# Patient Record
Sex: MALE | Race: WHITE | NOT HISPANIC OR LATINO | Employment: OTHER | URBAN - METROPOLITAN AREA
[De-identification: names, ages, dates, MRNs, and addresses within clinical notes are randomized per-mention and may not be internally consistent; named-entity substitution may affect disease eponyms.]

---

## 2017-01-06 NOTE — PROGRESS NOTES
SUBJECTIVE:                                                    Markel Padilla is a 76 year old male who presents to clinic today for the following health issues:    Hyperlipidemia Follow-Up      Rate your low fat/cholesterol diet?: fair    Taking statin?  Yes, no muscle aches from statin    Other lipid medications/supplements?:  Fish oil/Omega 3, without side effects     Hypertension Follow-up      Outpatient blood pressures are being checked at home.  Results are vary - brought in readings today to review. Was 130/70 this morning    Low Salt Diet: not monitoring salt       Amount of exercise or physical activity: 2 times a day     Problems taking medications regularly: No    Medication side effects: none    Diet: low fat/cholesterol  Years of fingers going white and numb when in cold. No injury. Tx: none.     Problem list and histories reviewed & adjusted, as indicated.  Additional history: as documented    Patient Active Problem List   Diagnosis     GERD (gastroesophageal reflux disease)     Erectile dysfunction     Hyperlipidemia LDL goal <130     Overweight (BMI 25.0-29.9)     Advanced directives, counseling/discussion     Hearing loss     Sleep disturbance     Hyponatremia     Eczema     Gastroesophageal reflux disease, esophagitis presence not specified     Hearing loss, bilateral     Benign essential hypertension     HTN, goal below 150/90     Raynaud's disease without gangrene     Past Surgical History   Procedure Laterality Date     No history of surgery         Social History   Substance Use Topics     Smoking status: Former Smoker     Types: Cigarettes     Quit date: 11/02/1979     Smokeless tobacco: Former User     Quit date: 04/07/1989     Alcohol Use: Yes      Comment: rare     Family History   Problem Relation Age of Onset     HEART DISEASE Mother      Lipids Mother      C.A.D. Father      age 65     Respiratory Sister      COPD     Hypertension Son      Hypertension Son          Current  Outpatient Prescriptions   Medication Sig Dispense Refill     simvastatin (ZOCOR) 40 MG tablet Take 1 tablet (40 mg) by mouth At Bedtime 90 tablet 3     lisinopril (PRINIVIL/ZESTRIL) 20 MG tablet Take 1 tablet (20 mg) by mouth daily 90 tablet 1     omeprazole (PRILOSEC) 20 MG capsule Take 1 capsule (20 mg) by mouth daily 90 capsule 4     triamcinolone (KENALOG) 0.5 % cream Apply sparingly to affected area three times daily. 30 g 0     ferrous sulfate 325 (65 FE) MG tablet Take by mouth daily (with breakfast)       calcium carbonate (OS-FREDIS 500 MG [place]. [place]) 500 MG tablet Take 1 tablet (500 mg) by mouth 2 times daily 180 tablet 3     Glucosamine HCl 1500 MG TABS Take 2 tablets by mouth daily 30 tablet 0     GLUCOSAMINE CHONDR 1500 COMPLX OR 1 tablet daily       aspirin 81 MG tablet Take 1 tablet by mouth daily.       MULTI-VITAMIN OR TABS 1 TABLET DAILY       FISH OIL 1000 MG OR CAPS 1 tablet daily       [DISCONTINUED] simvastatin (ZOCOR) 40 MG tablet Take 1 tablet (40 mg) by mouth At Bedtime 90 tablet 3     [DISCONTINUED] lisinopril (PRINIVIL,ZESTRIL) 20 MG tablet Take 1 tablet (20 mg) by mouth daily 90 tablet 0     Allergies   Allergen Reactions     Nkda [No Known Drug Allergies]      BP Readings from Last 3 Encounters:   01/10/17 148/72   09/09/16 136/77   07/27/16 138/77    Wt Readings from Last 3 Encounters:   01/10/17 170 lb 3.2 oz (77.202 kg)   09/09/16 169 lb (76.658 kg)   07/27/16 166 lb (75.297 kg)                  Labs reviewed in EPIC  Problem list, Medication list, Allergies, and Medical/Social/Surgical histories reviewed in [facility] and updated as appropriate.    ROS:  Constitutional, HEENT, cardiovascular, pulmonary, gi and gu systems are negative, except as otherwise noted.    OBJECTIVE:                                                    /72 mmHg  Pulse 63  Wt 170 lb 3.2 oz (77.202 kg)  SpO2 99%  Body mass index is 26.65 kg/(m^2).  GENERAL: healthy, alert and no distress  NECK: no adenopathy, no  asymmetry, masses, or scars and thyroid normal to palpation  RESP: lungs clear to auscultation - no rales, rhonchi or wheezes  CV: regular rate and rhythm, normal S1 S2, no S3 or S4, no murmur, click or rub, no peripheral edema and peripheral pulses strong  ABDOMEN: soft, nontender, no hepatosplenomegaly, no masses and bowel sounds normal  MS: no gross musculoskeletal defects noted, no edema    Diagnostic Test Results:  none      ASSESSMENT/PLAN:                                                        ICD-10-CM    1. HTN, goal below 150/90 I10 lisinopril (PRINIVIL/ZESTRIL) 20 MG tablet     Basic metabolic panel   2. Hyperlipidemia LDL goal <130 E78.5 simvastatin (ZOCOR) 40 MG tablet     Lipid panel reflex to direct LDL   3. Need for vaccination Z23 Pneumococcal vaccine 13 valent PCV13 IM (Prevnar) [01322]     1st  Administration  [22723]   4. Raynaud's disease without gangrene I73.00    meds refilled  Labs pending  Recheck blood pressure in 6 months.  Patient reassurance on raynauds. tx discussed. Elects to try to dress warmer with layers.     Jonathan Oliver PA-C  [facility]

## 2017-01-10 ENCOUNTER — OFFICE VISIT (OUTPATIENT)
Dept: FAMILY MEDICINE | Facility: CLINIC | Age: 77
End: 2017-01-10
Payer: COMMERCIAL

## 2017-01-10 VITALS
OXYGEN SATURATION: 99 % | BODY MASS INDEX: 26.65 KG/M2 | HEART RATE: 63 BPM | DIASTOLIC BLOOD PRESSURE: 72 MMHG | WEIGHT: 170.2 LBS | SYSTOLIC BLOOD PRESSURE: 148 MMHG

## 2017-01-10 DIAGNOSIS — I10 HTN, GOAL BELOW 150/90: Primary | ICD-10-CM

## 2017-01-10 DIAGNOSIS — E78.5 HYPERLIPIDEMIA LDL GOAL <130: ICD-10-CM

## 2017-01-10 DIAGNOSIS — Z23 NEED FOR VACCINATION: ICD-10-CM

## 2017-01-10 DIAGNOSIS — I73.00 RAYNAUD'S DISEASE WITHOUT GANGRENE: ICD-10-CM

## 2017-01-10 LAB
ANION GAP SERPL CALCULATED.3IONS-SCNC: 8 MMOL/L (ref 3–14)
BUN SERPL-MCNC: 8 MG/DL (ref 7–30)
CALCIUM SERPL-MCNC: 9.2 MG/DL (ref 8.5–10.1)
CHLORIDE SERPL-SCNC: 101 MMOL/L (ref 94–109)
CHOLEST SERPL-MCNC: 166 MG/DL
CO2 SERPL-SCNC: 28 MMOL/L (ref 20–32)
CREAT SERPL-MCNC: 0.83 MG/DL (ref 0.66–1.25)
GFR SERPL CREATININE-BSD FRML MDRD: 89 ML/MIN/1.7M2
GLUCOSE SERPL-MCNC: 99 MG/DL (ref 70–99)
HDLC SERPL-MCNC: 67 MG/DL
LDLC SERPL CALC-MCNC: 84 MG/DL
NONHDLC SERPL-MCNC: 99 MG/DL
POTASSIUM SERPL-SCNC: 4.4 MMOL/L (ref 3.4–5.3)
SODIUM SERPL-SCNC: 137 MMOL/L (ref 133–144)
TRIGL SERPL-MCNC: 73 MG/DL

## 2017-01-10 PROCEDURE — 80048 BASIC METABOLIC PNL TOTAL CA: CPT | Performed by: PHYSICIAN ASSISTANT

## 2017-01-10 PROCEDURE — 36415 COLL VENOUS BLD VENIPUNCTURE: CPT | Performed by: PHYSICIAN ASSISTANT

## 2017-01-10 PROCEDURE — 90471 IMMUNIZATION ADMIN: CPT | Performed by: PHYSICIAN ASSISTANT

## 2017-01-10 PROCEDURE — 99213 OFFICE O/P EST LOW 20 MIN: CPT | Mod: 25 | Performed by: PHYSICIAN ASSISTANT

## 2017-01-10 PROCEDURE — 90670 PCV13 VACCINE IM: CPT | Performed by: PHYSICIAN ASSISTANT

## 2017-01-10 PROCEDURE — 80061 LIPID PANEL: CPT | Performed by: PHYSICIAN ASSISTANT

## 2017-01-10 RX ORDER — LISINOPRIL 20 MG/1
20 TABLET ORAL DAILY
Qty: 90 TABLET | Refills: 1 | Status: SHIPPED | OUTPATIENT
Start: 2017-01-10 | End: 2017-08-16

## 2017-01-10 RX ORDER — SIMVASTATIN 40 MG
40 TABLET ORAL AT BEDTIME
Qty: 90 TABLET | Refills: 3 | Status: SHIPPED | OUTPATIENT
Start: 2017-01-10 | End: 2017-08-16

## 2017-01-10 NOTE — PROGRESS NOTES
Quick Note:    Mr. Padilla,    All of your labs were normal for you.    Please contact the clinic if you have additional questions. Thank you.    Sincerely,    Jonathan Oliver PA-C       ______

## 2017-01-10 NOTE — NURSING NOTE
"Chief Complaint   Patient presents with     Lipids     Hypertension       Initial /76 mmHg  Pulse 63  Wt 170 lb 3.2 oz (77.202 kg)  SpO2 99% Estimated body mass index is 26.65 kg/(m^2) as calculated from the following:    Height as of 1/27/16: 5' 7\" (1.702 m).    Weight as of this encounter: 170 lb 3.2 oz (77.202 kg).  BP completed using cuff size: daisy Plascencia CMA      "

## 2017-01-10 NOTE — MR AVS SNAPSHOT
"              After Visit Summary   1/10/2017    Markel Padilla    MRN: [id number]           Patient Information     Date Of Birth          1940        Visit Information        Provider Department      1/10/2017 7:40 AM Jonathan Oliver PA-C [facility]        Today's Diagnoses     HTN, goal below 150/90    -  1     Hyperlipidemia LDL goal <130            Follow-ups after your visit        Who to contact     If you have questions or need follow up information about today's clinic visit or your schedule please contact [facility] directly at [phone].  Normal or non-critical lab and imaging results will be communicated to you by [employer]ist[employer], letter or phone within 4 business days after the clinic has received the results. If you do not hear from us within 7 days, please contact the clinic through [employer] or phone. If you have a critical or abnormal lab result, we will notify you by phone as soon as possible.  Submit refill requests through [employer] or call your pharmacy and they will forward the refill request to us. Please allow 3 business days for your refill to be completed.          Additional Information About Your Visit        MyChart Information     [employer] lets you send messages to your doctor, view your test results, renew your prescriptions, schedule appointments and more. To sign up, go to [email or web address] . Click on \"Log in\" on the left side of the screen, which will take you to the Welcome page. Then click on \"Sign up Now\" on the right side of the page.     You will be asked to enter the access code listed below, as well as some personal information. Please follow the directions to create your username and password.     Your access code is: [email or web address]  Expires: 4/10/2017  7:54 AM     Your access code will  in 90 days. If you need help or a new code, please call your [facility] or [phone].        Care EveryWhere ID     This is your Care " EveryWhere ID. This could be used by other organizations to access your [place] medical records  [id number]        Your Vitals Were     Pulse Pulse Oximetry                63 99%           Blood Pressure from Last 3 Encounters:   01/10/17 148/72   09/09/16 136/77   07/27/16 138/77    Weight from Last 3 Encounters:   01/10/17 170 lb 3.2 oz (77.202 kg)   09/09/16 169 lb (76.658 kg)   07/27/16 166 lb (75.297 kg)              Today, you had the following     No orders found for display         Where to get your medicines      These medications were sent to [facility] [address] [place], [place] - [address] [facility] [address]  [address] [facility] [place] [address]     Phone:  [phone]    - lisinopril 20 MG tablet  - simvastatin 40 MG tablet       Primary Care Provider Office Phone # Fax #    Jonathan Oliver PA-C [phone]       [facility] [address] [facility] [address]        Thank you!     Thank you for choosing [facility]  for your care. Our goal is always to provide you with excellent care. Hearing back from our patients is one way we can continue to improve our services. Please take a few minutes to complete the written survey that you may receive in the mail after your visit with us. Thank you!             Your Updated Medication List - Protect others around you: Learn how to safely use, store and throw away your medicines at www.disposemymeds.org.          This list is accurate as of: 1/10/17  7:54 AM.  Always use your most recent med list.                   Brand Name Dispense Instructions for use    aspirin 81 MG tablet      Take 1 tablet by mouth daily.       calcium carbonate 500 MG tablet    OS-FREDIS 500 mg [place]. Ca    180 tablet    Take 1 tablet (500 mg) by mouth 2 times daily       ferrous sulfate 325 (65 FE) MG tablet    IRON     Take by mouth daily (with breakfast)       fish oil-omega-3 fatty acids 1000 MG capsule      1  tablet daily       GLUCOSAMINE CHONDR 1500 COMPLX PO      1 tablet daily       Glucosamine HCl 1500 MG Tabs     30 tablet    Take 2 tablets by mouth daily       lisinopril 20 MG tablet    PRINIVIL/ZESTRIL    90 tablet    Take 1 tablet (20 mg) by mouth daily       Multi-vitamin Tabs tablet   Generic drug:  multivitamin, therapeutic with minerals      1 TABLET DAILY       omeprazole 20 MG CR capsule    priLOSEC    90 capsule    Take 1 capsule (20 mg) by mouth daily       simvastatin 40 MG tablet    ZOCOR    90 tablet    Take 1 tablet (40 mg) by mouth At Bedtime       triamcinolone 0.5 % cream    KENALOG    30 g    Apply sparingly to affected area three times daily.

## 2017-01-10 NOTE — NURSING NOTE
Screening Questionnaire for Adult Immunization    Are you sick today?   No   Do you have allergies to medications, food, a vaccine component or latex?   No   Have you ever had a serious reaction after receiving a vaccination?   No   Do you have a long-term health problem with heart disease, lung disease, asthma, kidney disease, metabolic disease (e.g. diabetes), anemia, or other blood disorder?   No   Do you have cancer, leukemia, HIV/AIDS, or any other immune system problem?   No   In the past 3 months, have you taken medications that affect  your immune system, such as prednisone, other steroids, or anticancer drugs; drugs for the treatment of rheumatoid arthritis, Crohn s disease, or psoriasis; or have you had radiation treatments?   No   Have you had a seizure, or a brain or other nervous system problem?   No   During the past year, have you received a transfusion of blood or blood     products, or been given immune (gamma) globulin or antiviral drug?   No   For women: Are you pregnant or is there a chance you could become        pregnant during the next month?   No   Have you received any vaccinations in the past 4 weeks?   No     Immunization questionnaire answers were all negative.      MNVFC doesn't apply on this patient    Per orders of ADO, injection of Prevnar 13 given by Dixie Dockery. Patient instructed to remain in clinic for 20 minutes afterwards, and to report any adverse reaction to me immediately.       Screening performed by Dixie Dockery on 1/10/2017 at 8:04 AM.

## 2017-01-10 NOTE — Clinical Note
[facility]  [address] [facility] [address] [phone]        January 10, 2017    Markel Padilla  [address]  [place] [address]            Dear Markel,    All of your labs were normal for you.    Please contact the clinic if you have additional questions.  Thank you.    Sincerely,    Jonathan Oliver PA-C/solomon    Results for orders placed or performed in visit on 01/10/17   Lipid panel reflex to direct LDL   Result Value Ref Range    Cholesterol 166 <200 mg/dL    Triglycerides 73 <150 mg/dL    HDL Cholesterol 67 >39 mg/dL    LDL Cholesterol Calculated 84 <100 mg/dL    Non HDL Cholesterol 99 <130 mg/dL   Basic metabolic panel   Result Value Ref Range    Sodium 137 133 - 144 mmol/L    Potassium 4.4 3.4 - 5.3 mmol/L    Chloride 101 94 - 109 mmol/L    Carbon Dioxide 28 20 - 32 mmol/L    Anion Gap 8 3 - 14 mmol/L    Glucose 99 70 - 99 mg/dL    Urea Nitrogen 8 7 - 30 mg/dL    Creatinine 0.83 0.66 - 1.25 mg/dL    GFR Estimate 89 >60 mL/min/1.7m2    GFR Estimate If Black >90   GFR Calc   >60 mL/min/1.7m2    Calcium 9.2 8.5 - 10.1 mg/dL

## 2017-03-02 ENCOUNTER — OFFICE VISIT (OUTPATIENT)
Dept: FAMILY MEDICINE | Facility: CLINIC | Age: 77
End: 2017-03-02
Payer: COMMERCIAL

## 2017-03-02 VITALS
OXYGEN SATURATION: 98 % | HEART RATE: 79 BPM | DIASTOLIC BLOOD PRESSURE: 78 MMHG | SYSTOLIC BLOOD PRESSURE: 148 MMHG | BODY MASS INDEX: 27.1 KG/M2 | TEMPERATURE: 97.9 F | WEIGHT: 173 LBS

## 2017-03-02 DIAGNOSIS — S16.1XXA STRAIN OF NECK MUSCLE, INITIAL ENCOUNTER: Primary | ICD-10-CM

## 2017-03-02 PROCEDURE — 99213 OFFICE O/P EST LOW 20 MIN: CPT | Performed by: PHYSICIAN ASSISTANT

## 2017-03-02 NOTE — MR AVS SNAPSHOT
"              After Visit Summary   3/2/2017    Markel Padilla    MRN: [id number]           Patient Information     Date Of Birth          1940        Visit Information        Provider Department      3/2/2017 11:00 AM Jonathan Oliver PA-C [facility]        Today's Diagnoses     Strain of neck muscle, initial encounter    -  1      Care Instructions    ice or cold packs 20 minutes every 2-3 hrs as needed to relieve pain and swelling, for the first 2 days. Then can apply heat 20 minutes every 2-3 hrs (avoid sleeping on heating pad) there after as needed.   If able to tolerate non-steroidal anti-inflammatory medication like:   Ibuprofen 400mg-600mg three times a day as needed   Or Tylenol three times a day as needed.   Active range of motion exercises encouraged  Activity modification trying to avoid activities that cause you pain.   Follow up 4 wks as needed     Jonathan Oliver PA-C            Follow-ups after your visit        Who to contact     If you have questions or need follow up information about today's clinic visit or your schedule please contact [facility] directly at [phone].  Normal or non-critical lab and imaging results will be communicated to you by [employer], letter or phone within 4 business days after the clinic has received the results. If you do not hear from us within 7 days, please contact the clinic through [employer] or phone. If you have a critical or abnormal lab result, we will notify you by phone as soon as possible.  Submit refill requests through [employer] or call your pharmacy and they will forward the refill request to us. Please allow 3 business days for your refill to be completed.          Additional Information About Your Visit        [employer] Information     [employer] lets you send messages to your doctor, view your test results, renew your prescriptions, schedule appointments and more. To sign up, go to [email or web address] . Click on \"Log " "in\" on the left side of the screen, which will take you to the Welcome page. Then click on \"Sign up Now\" on the right side of the page.     You will be asked to enter the access code listed below, as well as some personal information. Please follow the directions to create your username and password.     Your access code is: [email or web address]  Expires: 4/10/2017  7:54 AM     Your access code will  in 90 days. If you need help or a new code, please call your [place] clinic or [phone].        Care EveryWhere ID     This is your Care EveryWhere ID. This could be used by other organizations to access your [facility] medical records  [id number]        Your Vitals Were     Pulse Temperature Pulse Oximetry BMI (Body Mass Index)          79 97.9  F (36.6  C) (Tympanic) 98% 27.1 kg/m2         Blood Pressure from Last 3 Encounters:   17 148/78   01/10/17 148/72   16 136/77    Weight from Last 3 Encounters:   17 173 lb (78.5 kg)   01/10/17 170 lb 3.2 oz (77.2 kg)   16 169 lb (76.7 kg)              Today, you had the following     No orders found for display       Primary Care Provider Office Phone # Fax #    Jonathan Oliver PA-C [phone]       [facility] [address] [facility] [address]        Thank you!     Thank you for choosing [facility]  for your care. Our goal is always to provide you with excellent care. Hearing back from our patients is one way we can continue to improve our services. Please take a few minutes to complete the written survey that you may receive in the mail after your visit with us. Thank you!             Your Updated Medication List - Protect others around you: Learn how to safely use, store and throw away your medicines at www.disposemymeds.org.          This list is accurate as of: 3/2/17 11:17 AM.  Always use your most recent med list.                   Brand Name Dispense Instructions for use    aspirin 81 MG tablet "      Take 1 tablet by mouth daily.       calcium carbonate 500 MG tablet    OS-FREDIS 500 mg [place]. Ca    180 tablet    Take 1 tablet (500 mg) by mouth 2 times daily       ferrous sulfate 325 (65 FE) MG tablet    IRON     Take by mouth daily (with breakfast)       fish oil-omega-3 fatty acids 1000 MG capsule      1 tablet daily       GLUCOSAMINE CHONDR 1500 COMPLX PO      1 tablet daily       Glucosamine HCl 1500 MG Tabs     30 tablet    Take 2 tablets by mouth daily       lisinopril 20 MG tablet    PRINIVIL/ZESTRIL    90 tablet    Take 1 tablet (20 mg) by mouth daily       Multi-vitamin Tabs tablet   Generic drug:  multivitamin, therapeutic with minerals      1 TABLET DAILY       omeprazole 20 MG CR capsule    priLOSEC    90 capsule    Take 1 capsule (20 mg) by mouth daily       simvastatin 40 MG tablet    ZOCOR    90 tablet    Take 1 tablet (40 mg) by mouth At Bedtime       triamcinolone 0.5 % cream    KENALOG    30 g    Apply sparingly to affected area three times daily.

## 2017-03-02 NOTE — PATIENT INSTRUCTIONS
ice or cold packs 20 minutes every 2-3 hrs as needed to relieve pain and swelling, for the first 2 days. Then can apply heat 20 minutes every 2-3 hrs (avoid sleeping on heating pad) there after as needed.   If able to tolerate non-steroidal anti-inflammatory medication like:   Ibuprofen 400mg-600mg three times a day as needed   Or Tylenol three times a day as needed.   Active range of motion exercises encouraged  Activity modification trying to avoid activities that cause you pain.   Follow up 4 wks as needed     Jonathan Oliver PA-C

## 2017-03-02 NOTE — PROGRESS NOTES
SUBJECTIVE:                                                    Markel Padilla is a 76 year old male who presents to clinic today for the following health issues:    neck pain      Duration: 3 days     Description  Pain shoulder blade to shoulder blade and radiates  back of neck - recently installed a kitchen sink. No pain at the time. Mild soreness with range of motion of rajan shoulder and neck. Feels stiff. No previous problems. No numbness/tingling.     Severity: severe if careful moderate     Accompanying signs and symptoms: None    History (predisposing factors):  none    Precipitating or alleviating factors: None    Therapies tried and outcome:  Juan Carlos savage,amador villeda,aspercreme        Problem list and histories reviewed & adjusted, as indicated.  Additional history: as documented    Patient Active Problem List   Diagnosis     GERD (gastroesophageal reflux disease)     Erectile dysfunction     Hyperlipidemia LDL goal <130     Overweight (BMI 25.0-29.9)     Advanced directives, counseling/discussion     Hearing loss     Sleep disturbance     Hyponatremia     Eczema     Gastroesophageal reflux disease, esophagitis presence not specified     Hearing loss, bilateral     Benign essential hypertension     HTN, goal below 150/90     Raynaud's disease without gangrene     Past Surgical History   Procedure Laterality Date     No history of surgery         Social History   Substance Use Topics     Smoking status: Former Smoker     Types: Cigarettes     Quit date: 11/2/1979     Smokeless tobacco: Former User     Quit date: 4/7/1989     Alcohol use Yes      Comment: rare     Family History   Problem Relation Age of Onset     HEART DISEASE Mother      Lipids Mother      C.A.D. Father      age 65     Respiratory Sister      COPD     Hypertension Son      Hypertension Son          Current Outpatient Prescriptions   Medication Sig Dispense Refill     simvastatin (ZOCOR) 40 MG tablet Take 1 tablet (40 mg) by mouth At Bedtime 90  tablet 3     lisinopril (PRINIVIL/ZESTRIL) 20 MG tablet Take 1 tablet (20 mg) by mouth daily 90 tablet 1     omeprazole (PRILOSEC) 20 MG capsule Take 1 capsule (20 mg) by mouth daily 90 capsule 4     triamcinolone (KENALOG) 0.5 % cream Apply sparingly to affected area three times daily. 30 g 0     ferrous sulfate 325 (65 FE) MG tablet Take by mouth daily (with breakfast)       calcium carbonate (OS-FREDIS 500 MG [place]. CA) 500 MG tablet Take 1 tablet (500 mg) by mouth 2 times daily 180 tablet 3     Glucosamine HCl 1500 MG TABS Take 2 tablets by mouth daily 30 tablet 0     GLUCOSAMINE CHONDR 1500 COMPLX OR 1 tablet daily       aspirin 81 MG tablet Take 1 tablet by mouth daily.       MULTI-VITAMIN OR TABS 1 TABLET DAILY       FISH OIL 1000 MG OR CAPS 1 tablet daily       Allergies   Allergen Reactions     Nkda [No Known Drug Allergies]      BP Readings from Last 3 Encounters:   03/02/17 148/78   01/10/17 148/72   09/09/16 136/77    Wt Readings from Last 3 Encounters:   03/02/17 173 lb (78.5 kg)   01/10/17 170 lb 3.2 oz (77.2 kg)   09/09/16 169 lb (76.7 kg)                  Labs reviewed in EPIC    ROS:  Constitutional, HEENT, cardiovascular, pulmonary, gi and gu systems are negative, except as otherwise noted.    OBJECTIVE:                                                    /78  Pulse 79  Temp 97.9  F (36.6  C) (Tympanic)  Wt 173 lb (78.5 kg)  SpO2 98%  BMI 27.1 kg/m2  Body mass index is 27.1 kg/(m^2).  GENERAL: healthy, alert and no distress  NECK: no adenopathy- mild upper trap and paracervical musculature tenderness.   Forward flexion posturing with Decreased range of motion.   RESP: lungs clear to auscultation - no rales, rhonchi or wheezes  CV: regular rate and rhythm, normal S1 S2, no S3 or S4, no murmur, click or rub, no peripheral edema and peripheral pulses strong  5/5 strength rajan upper extremities  Neurovascularly Intact Distally.      Diagnostic Test Results:  none      ASSESSMENT/PLAN:                                                         ICD-10-CM    1. Strain of neck muscle, initial encounter S16.1XXA      Suspect musculoskeletal strain.   See Patient Instructions  warning signs discussed.  Follow up  4 wks as needed.     Jonathan Olivre PA-C  [facility]

## 2017-03-02 NOTE — NURSING NOTE
"Chief Complaint   Patient presents with     Shoulder Pain       Initial /73  Pulse 79  Temp 97.9  F (36.6  C) (Tympanic)  Wt 173 lb (78.5 kg)  SpO2 98%  BMI 27.1 kg/m2 Estimated body mass index is 27.1 kg/(m^2) as calculated from the following:    Height as of 1/27/16: 5' 7\" (1.702 m).    Weight as of this encounter: 173 lb (78.5 kg).  Medication Reconciliation: complete  "

## 2017-04-17 DIAGNOSIS — K21.9 GASTROESOPHAGEAL REFLUX DISEASE, ESOPHAGITIS PRESENCE NOT SPECIFIED: ICD-10-CM

## 2017-05-16 ENCOUNTER — OFFICE VISIT (OUTPATIENT)
Dept: FAMILY MEDICINE | Facility: CLINIC | Age: 77
End: 2017-05-16
Payer: COMMERCIAL

## 2017-05-16 ENCOUNTER — RADIANT APPOINTMENT (OUTPATIENT)
Dept: GENERAL RADIOLOGY | Facility: CLINIC | Age: 77
End: 2017-05-16
Attending: PHYSICIAN ASSISTANT
Payer: COMMERCIAL

## 2017-05-16 VITALS
BODY MASS INDEX: 26.63 KG/M2 | OXYGEN SATURATION: 99 % | DIASTOLIC BLOOD PRESSURE: 76 MMHG | TEMPERATURE: 97.3 F | SYSTOLIC BLOOD PRESSURE: 146 MMHG | HEART RATE: 80 BPM | WEIGHT: 170 LBS

## 2017-05-16 DIAGNOSIS — M54.2 NECK PAIN: Primary | ICD-10-CM

## 2017-05-16 PROCEDURE — 99213 OFFICE O/P EST LOW 20 MIN: CPT | Performed by: PHYSICIAN ASSISTANT

## 2017-05-16 PROCEDURE — 72040 X-RAY EXAM NECK SPINE 2-3 VW: CPT

## 2017-05-16 NOTE — MR AVS SNAPSHOT
After Visit Summary   5/16/2017    Markel Padilla    MRN: [id number]           Patient Information     Date Of Birth          1940        Visit Information        Provider Department      5/16/2017 10:40 AM Jonathan Oliver PA-C [facility]        Today's Diagnoses     Neck pain    -  1      Care Instructions    ice or cold packs 20 minutes every 2-3 hrs as needed to relieve pain and swelling, for the first 2 days. Then can apply heat 20 minutes every 2-3 hrs (avoid sleeping on heating pad) there after as needed.   If able to tolerate non-steroidal anti-inflammatory medication like: Ibuprofen   Active range of motion exercises encouraged  Activity modification trying to avoid activities that cause you pain.   Follow up 2-4wks as needed     Jonathan Oliver PA-C                      Neck Strain             What is neck strain?   A strain is a tear of a muscle or tendon. Your neck is surrounded by small muscles that are close to the vertebrae, and larger muscles that make up the visible muscles of the neck.   How does it occur?   Neck strains most often happen when the head and neck are forcibly moved, such as in a whiplash injury or from contact in sports. Sometimes strains happen from an awkward position during sleep or poor posture while working at a computer.   What are the symptoms?   Symptoms include pain in your neck. When the neck muscles go into spasm you feel hard, tight muscles in your neck that are very tender to the touch. You have pain when you move your head to the side or when you try to move your head up or down. The spasming muscles can cause headaches.   The pain may start right after an injury or may take a few hours or days to develop. Other symptoms may include neck stiffness, dizziness, or unusual sensations, such as burning or a pins-and-needles feeling.   How is it diagnosed?   Your healthcare provider will examine your neck. You may have X-rays to make  sure the vertebrae are not injured.   How is it treated?   Right after the injury, put an ice pack, gel pack, or package of frozen vegetables, wrapped in a cloth on the area every 3 to 4 hours, for up to 20 minutes at a time.   If you still have neck pain several days after the injury and after using ice, your healthcare provider may recommend using moist heat on your neck. You can buy a moist-heat pad or make your own by soaking towels in hot water. Put moist heat on your neck for up to 20 minutes at a time every 3 or 4 hours until the pain goes away. You may find that it helps to alternate putting heat and ice on your neck.   Your healthcare provider may prescribe an anti-inflammatory medicine and a neck collar to support your neck and prevent further injury. Nonsteroidal anti-inflammatory medicines (NSAIDs) may cause stomach bleeding and other problems. These risks increase with age. Read the label and take as directed. Unless recommended by your healthcare provider, do not take for more than 10 days.   Follow your provider's instructions for doing exercises to help you recover.   How long will the effects last?   How long it takes to recover depends on your age, health, and if you have had a previous neck injury. Recovery time also depends on the severity of the injury. A mild injury may recover within a few weeks, whereas a severe injury may take 6 weeks or longer to recover. Ask your healthcare provider when you can return to your normal activities.   How can I prevent neck strain?   Neck strain is best prevented by having strong and supple neck muscles. If you have a job that requires you to be in one position all day (for example, work at a computer all day), it is very important to take breaks and stretch your neck muscles. Your provider will give you exercises to do while taking breaks from work.     Published by [employer].  This content is reviewed periodically and is subject to change as [employer]  information becomes available. The information is intended to inform and educate and is not a replacement for medical evaluation, advice, diagnosis or treatment by a healthcare professional.   Written by Alex Witt MD, for [employer].   ? 2010 [facility] and/or its affiliates. All Rights Reserved.   Copyright   [employer] Reference Systems 2011  Adult Health Advisor                    Neck Strain Rehabilitation Exercises                Do these exercises only if you do not have pain or numbness running down your arm or into your hand. The first 6 exercises are meant to help your neck remain flexible. Do not do any exercises that make your neck pain worse.   Active neck rotation: Sit in a chair, keeping your neck, shoulders, and trunk straight. First, turn your head slowly to the right. Move it gently to the point of pain. Move it back to the forward position. Relax. Then move it to the left. Repeat 10 times.   Active neck side bend: Sit in a chair, keeping your neck, shoulders, and trunk straight. Tilt your head so that your right ear moves toward your right shoulder. Move it to the point of pain. Then tilt your head so your left ear moves toward your left shoulder. Make sure you do not rotate your head while tilting or raise your shoulder toward your head. Repeat this exercise 10 times in each direction.   Neck flexion: Sit in a chair, keeping your neck, shoulders, and trunk straight. Bend your head forward, reaching your chin toward your chest. Hold for 5 seconds. Repeat 10 times.   Neck extension: Sit in a chair, keeping your neck, shoulders, and trunk straight. Bring your head back so that your chin is pointing toward the ceiling. Repeat 10 times.   Chin tuck: Place your fingertips on your chin and gently push your head straight back as if you are trying to make a double chin. Keep looking forward as your head moves back. Hold 5 seconds and repeat 5 times.   Scalene stretch: Sit or stand and clasp both  hands behind your back. Lower your left shoulder and tilt your head toward the right until you feel a stretch. Hold this position for 15 to 30 seconds and then come back to the starting position. Then lower your right shoulder and tilt your head toward the left. Hold for 15 to 30 seconds. Repeat 3 times on each side.   Isometric neck flexion: Sit tall, eyes straight ahead, and chin level. Place your palm against your forehead and gently push your forehead into your palm. Hold for 5 seconds and release. Do 3 sets of 5.   Isometric neck extension: Sit tall, eyes straight ahead, and chin level. Clasp your hands together and place them behind your head. Press the back of your head into your palms. Hold 5 seconds and release. Do 3 sets of 5.   Isometric neck side bend: Sit tall, eyes straight ahead, and chin level. Place the palm of your hand at the side of your temple and press your temple into the palm of your hand. Hold 5 seconds and release. Do 3 sets of 5 on each side.   Head lift: Neck curl: Lie on your back with your knees bent and your feet flat on the floor. Tuck your chin and lift your head toward your chest, keeping your shoulders on the floor. Hold for 5 seconds. Repeat 10 times.   Head lift: Neck side bend: Lie on your right side with your right arm lying straight out. Rest your head on your arm, then lift your head slowly toward your left shoulder. Hold for 5 seconds. Repeat 10 times. Switch to your left side and repeat the exercise, lifting your head toward your right shoulder.   Neck extension on hands and knees: Get on your hands and knees and look down at the floor. Keep your back straight and let your head slowly drop toward your chest. Then tuck your chin slightly and lift your head up until your neck is level with your back. Hold this position for 5 seconds. Repeat 10 times.   Scapular squeeze: While sitting or standing with your arms by your sides, squeeze your shoulder blades together and hold for  5 seconds. Do 3 sets of 10.   Published by [employer].  This content is reviewed periodically and is subject to change as new health information becomes available. The information is intended to inform and educate and is not a replacement for medical evaluation, advice, diagnosis or treatment by a healthcare professional.   Written by Mildred Ruiz, MS, PT, and Kathie Nettles, PT, [facility], for [employer].   ? 2010 [facility] and/or its affiliates. All Rights Reserved.   Copyright   Clinical Reference Systems 2011  Adult Health Advisor                               Follow-ups after your visit        Additional Services     [facility] PT, HAND, AND CHIROPRACTIC REFERRAL       **This order will print in the [facility] Scheduling Office**    Physical Therapy, Hand Therapy and Chiropractic Care are available through:    *[facility]  *[facility]  *[facility]    Call one number to schedule at any of the above locations: [phone].    Your provider has referred you to: Physical Therapy at [facility] or [facility]    Indication/Reason for Referral: neck pain  Onset of Illness: 6 months  Therapy Orders: Evaluate and Treat  Special Programs: None  Special Request: Willie Zacarias      Additional Comments for the Therapist or Chiropractor:     Please be aware that coverage of these services is subject to the terms and limitations of your health insurance plan.  Call member services at your health plan with any benefit or coverage questions.      Please bring the following to your appointment:    *Your personal calendar for scheduling future appointments  *Comfortable clothing                  Who to contact     If you have questions or need follow up information about today's clinic visit or your schedule please contact [facility] directly at [phone].  Normal or non-critical lab and imaging results will be communicated to you by MyChart, letter or phone within 4  "business days after the clinic has received the results. If you do not hear from us within 7 days, please contact the clinic through [employer] or phone. If you have a critical or abnormal lab result, we will notify you by phone as soon as possible.  Submit refill requests through [employer] or call your pharmacy and they will forward the refill request to us. Please allow 3 business days for your refill to be completed.          Additional Information About Your Visit        [employer] Information     [employer] lets you send messages to your doctor, view your test results, renew your prescriptions, schedule appointments and more. To sign up, go to [email or web address] . Click on \"Log in\" on the left side of the screen, which will take you to the Welcome page. Then click on \"Sign up Now\" on the right side of the page.     You will be asked to enter the access code listed below, as well as some personal information. Please follow the directions to create your username and password.     Your access code is: [email or web address]  Expires: 2017 11:14 AM     Your access code will  in 90 days. If you need help or a new code, please call your [place] clinic or [phone].        Care EveryWhere ID     This is your Care EveryWhere ID. This could be used by other organizations to access your [facility] medical records  [id number]        Your Vitals Were     Pulse Temperature Pulse Oximetry BMI (Body Mass Index)          80 97.3  F (36.3  C) (Oral) 99% 26.63 kg/m2         Blood Pressure from Last 3 Encounters:   17 146/76   17 148/78   01/10/17 148/72    Weight from Last 3 Encounters:   17 170 lb (77.1 kg)   17 173 lb (78.5 kg)   01/10/17 170 lb 3.2 oz (77.2 kg)              We Performed the Following     [facility] PT, HAND, AND CHIROPRACTIC REFERRAL     XR Cervical Spine 2/3 Views        Primary Care Provider Office Phone # Fax #    Jonathan Oliver PA-C [phone]       [facility] " [facility] [address] [facility] [address]        Thank you!     Thank you for choosing [facility]  for your care. Our goal is always to provide you with excellent care. Hearing back from our patients is one way we can continue to improve our services. Please take a few minutes to complete the written survey that you may receive in the mail after your visit with us. Thank you!             Your Updated Medication List - Protect others around you: Learn how to safely use, store and throw away your medicines at www.disposemymeds.org.          This list is accurate as of: 5/16/17 11:14 AM.  Always use your most recent med list.                   Brand Name Dispense Instructions for use    aspirin 81 MG tablet      Take 1 tablet by mouth daily.       calcium carbonate 500 MG tablet    OS-FREDIS 500 mg [place]    180 tablet    Take 1 tablet (500 mg) by mouth 2 times daily       ferrous sulfate 325 (65 FE) MG tablet    IRON     Take by mouth daily (with breakfast)       fish oil-omega-3 fatty acids 1000 MG capsule      1 tablet daily       GLUCOSAMINE CHONDR 1500 COMPLX PO      1 tablet daily       Glucosamine HCl 1500 MG Tabs     30 tablet    Take 2 tablets by mouth daily       lisinopril 20 MG tablet    PRINIVIL/ZESTRIL    90 tablet    Take 1 tablet (20 mg) by mouth daily       Multi-vitamin Tabs tablet   Generic drug:  multivitamin, therapeutic with minerals      1 TABLET DAILY       omeprazole 20 MG CR capsule    priLOSEC    90 capsule    TAKE 1 CAPSULE BY MOUTH ONCE DAILY       simvastatin 40 MG tablet    ZOCOR    90 tablet    Take 1 tablet (40 mg) by mouth At Bedtime       triamcinolone 0.5 % cream    KENALOG    30 g    Apply sparingly to affected area three times daily.

## 2017-05-16 NOTE — PROGRESS NOTES
SUBJECTIVE:                                                    Markel Padilla is a 76 year old male who presents to clinic today for the following health issues:      Neck stiffness, started Sunday ,bengay,icy hot did not work   Off and on symptoms in the past. No numbness/tingling  No injury. Pain with range of motion at times.    Problem list and histories reviewed & adjusted, as indicated.  Additional history: as documented    Patient Active Problem List   Diagnosis     GERD (gastroesophageal reflux disease)     Erectile dysfunction     Hyperlipidemia LDL goal <130     Overweight (BMI 25.0-29.9)     Advanced directives, counseling/discussion     Hearing loss     Sleep disturbance     Hyponatremia     Eczema     Gastroesophageal reflux disease, esophagitis presence not specified     Hearing loss, bilateral     Benign essential hypertension     HTN, goal below 150/90     Raynaud's disease without gangrene     Past Surgical History:   Procedure Laterality Date     NO HISTORY OF SURGERY         Social History   Substance Use Topics     Smoking status: Former Smoker     Types: Cigarettes     Quit date: 11/2/1979     Smokeless tobacco: Former User     Quit date: 4/7/1989     Alcohol use Yes      Comment: rare     Family History   Problem Relation Age of Onset     HEART DISEASE Mother      Lipids Mother      C.A.D. Father      age 65     Respiratory Sister      COPD     Hypertension Son      Hypertension Son          Current Outpatient Prescriptions   Medication Sig Dispense Refill     omeprazole (PRILOSEC) 20 MG CR capsule TAKE 1 CAPSULE BY MOUTH ONCE DAILY 90 capsule 2     simvastatin (ZOCOR) 40 MG tablet Take 1 tablet (40 mg) by mouth At Bedtime 90 tablet 3     lisinopril (PRINIVIL/ZESTRIL) 20 MG tablet Take 1 tablet (20 mg) by mouth daily 90 tablet 1     triamcinolone (KENALOG) 0.5 % cream Apply sparingly to affected area three times daily. 30 g 0     ferrous sulfate 325 (65 FE) MG tablet Take by mouth daily  (with breakfast)       calcium carbonate (OS-FREDIS 500 MG [place]. [place]) 500 MG tablet Take 1 tablet (500 mg) by mouth 2 times daily 180 tablet 3     Glucosamine HCl 1500 MG TABS Take 2 tablets by mouth daily 30 tablet 0     GLUCOSAMINE CHONDR 1500 COMPLX OR 1 tablet daily       aspirin 81 MG tablet Take 1 tablet by mouth daily.       MULTI-VITAMIN OR TABS 1 TABLET DAILY       FISH OIL 1000 MG OR CAPS 1 tablet daily       Allergies   Allergen Reactions     Nkda [No Known Drug Allergies]      BP Readings from Last 3 Encounters:   05/16/17 146/76   03/02/17 148/78   01/10/17 148/72    Wt Readings from Last 3 Encounters:   05/16/17 170 lb (77.1 kg)   03/02/17 173 lb (78.5 kg)   01/10/17 170 lb 3.2 oz (77.2 kg)                  Labs reviewed in EPIC    Reviewed and updated as needed this visit by clinical staff  Tobacco  Allergies  Meds  Problems  Med Hx  Surg Hx  Fam Hx  Soc Hx        Reviewed and updated as needed this visit by Provider  Allergies  Meds  Problems         ROS:  Constitutional, HEENT, cardiovascular, pulmonary, gi and gu systems are negative, except as otherwise noted.    OBJECTIVE:                                                    /76  Pulse 80  Temp 97.3  F (36.3  C) (Oral)  Wt 170 lb (77.1 kg)  SpO2 99%  BMI 26.63 kg/m2  Body mass index is 26.63 kg/(m^2).  GENERAL: healthy, alert and no distress  Cervical Spine Exam: Inspection: increased cervical lordosis  Tender:  none  Range of Motion:  Generalized stiffnes  Strength: 5/5  rajan upper extremities   Neurovascularly Intact Distally.      Diagnostic Test Results:  X-ray cervical: DJD pending radiology      ASSESSMENT/PLAN:                                                        ICD-10-CM    1. Neck pain M54.2 XR Cervical Spine 2/3 Views     [facility] PT, HAND, AND CHIROPRACTIC REFERRAL   See Patient Instructions  Consider PHYSICAL THERAPY  warning signs discussed.  Follow up  6 wks as needed   Activity modification.      Jonathan Oliver,  PRUDENCE  [facility]

## 2017-05-16 NOTE — NURSING NOTE
"Chief Complaint   Patient presents with     Neck Pain       Initial /76  Pulse 80  Temp 97.3  F (36.3  C) (Oral)  Wt 170 lb (77.1 kg)  SpO2 99%  BMI 26.63 kg/m2 Estimated body mass index is 26.63 kg/(m^2) as calculated from the following:    Height as of 1/27/16: 5' 7\" (1.702 m).    Weight as of this encounter: 170 lb (77.1 kg).  Medication Reconciliation: complete  "

## 2017-05-16 NOTE — PATIENT INSTRUCTIONS
ice or cold packs 20 minutes every 2-3 hrs as needed to relieve pain and swelling, for the first 2 days. Then can apply heat 20 minutes every 2-3 hrs (avoid sleeping on heating pad) there after as needed.   If able to tolerate non-steroidal anti-inflammatory medication like: Ibuprofen   Active range of motion exercises encouraged  Activity modification trying to avoid activities that cause you pain.   Follow up 2-4wks as needed     Jonathan Oliver PA-C                      Neck Strain             What is neck strain?   A strain is a tear of a muscle or tendon. Your neck is surrounded by small muscles that are close to the vertebrae, and larger muscles that make up the visible muscles of the neck.   How does it occur?   Neck strains most often happen when the head and neck are forcibly moved, such as in a whiplash injury or from contact in sports. Sometimes strains happen from an awkward position during sleep or poor posture while working at a computer.   What are the symptoms?   Symptoms include pain in your neck. When the neck muscles go into spasm you feel hard, tight muscles in your neck that are very tender to the touch. You have pain when you move your head to the side or when you try to move your head up or down. The spasming muscles can cause headaches.   The pain may start right after an injury or may take a few hours or days to develop. Other symptoms may include neck stiffness, dizziness, or unusual sensations, such as burning or a pins-and-needles feeling.   How is it diagnosed?   Your healthcare provider will examine your neck. You may have X-rays to make sure the vertebrae are not injured.   How is it treated?   Right after the injury, put an ice pack, gel pack, or package of frozen vegetables, wrapped in a cloth on the area every 3 to 4 hours, for up to 20 minutes at a time.   If you still have neck pain several days after the injury and after using ice, your healthcare provider may recommend using moist  heat on your neck. You can buy a moist-heat pad or make your own by soaking towels in hot water. Put moist heat on your neck for up to 20 minutes at a time every 3 or 4 hours until the pain goes away. You may find that it helps to alternate putting heat and ice on your neck.   Your healthcare provider may prescribe an anti-inflammatory medicine and a neck collar to support your neck and prevent further injury. Nonsteroidal anti-inflammatory medicines (NSAIDs) may cause stomach bleeding and other problems. These risks increase with age. Read the label and take as directed. Unless recommended by your healthcare provider, do not take for more than 10 days.   Follow your provider's instructions for doing exercises to help you recover.   How long will the effects last?   How long it takes to recover depends on your age, health, and if you have had a previous neck injury. Recovery time also depends on the severity of the injury. A mild injury may recover within a few weeks, whereas a severe injury may take 6 weeks or longer to recover. Ask your healthcare provider when you can return to your normal activities.   How can I prevent neck strain?   Neck strain is best prevented by having strong and supple neck muscles. If you have a job that requires you to be in one position all day (for example, work at a computer all day), it is very important to take breaks and stretch your neck muscles. Your provider will give you exercises to do while taking breaks from work.     Published by [employer].  This content is reviewed periodically and is subject to change as new health information becomes available. The information is intended to inform and educate and is not a replacement for medical evaluation, advice, diagnosis or treatment by a healthcare professional.   Written by Alex Witt MD, for [employer].   ? 2010 [employer] and/or its affiliates. All Rights Reserved.   Copyright   [employer] 2011  Adult  Health Advisor                    Neck Strain Rehabilitation Exercises                Do these exercises only if you do not have pain or numbness running down your arm or into your hand. The first 6 exercises are meant to help your neck remain flexible. Do not do any exercises that make your neck pain worse.   Active neck rotation: Sit in a chair, keeping your neck, shoulders, and trunk straight. First, turn your head slowly to the right. Move it gently to the point of pain. Move it back to the forward position. Relax. Then move it to the left. Repeat 10 times.   Active neck side bend: Sit in a chair, keeping your neck, shoulders, and trunk straight. Tilt your head so that your right ear moves toward your right shoulder. Move it to the point of pain. Then tilt your head so your left ear moves toward your left shoulder. Make sure you do not rotate your head while tilting or raise your shoulder toward your head. Repeat this exercise 10 times in each direction.   Neck flexion: Sit in a chair, keeping your neck, shoulders, and trunk straight. Bend your head forward, reaching your chin toward your chest. Hold for 5 seconds. Repeat 10 times.   Neck extension: Sit in a chair, keeping your neck, shoulders, and trunk straight. Bring your head back so that your chin is pointing toward the ceiling. Repeat 10 times.   Chin tuck: Place your fingertips on your chin and gently push your head straight back as if you are trying to make a double chin. Keep looking forward as your head moves back. Hold 5 seconds and repeat 5 times.   Scalene stretch: Sit or stand and clasp both hands behind your back. Lower your left shoulder and tilt your head toward the right until you feel a stretch. Hold this position for 15 to 30 seconds and then come back to the starting position. Then lower your right shoulder and tilt your head toward the left. Hold for 15 to 30 seconds. Repeat 3 times on each side.   Isometric neck flexion: Sit tall, eyes  straight ahead, and chin level. Place your palm against your forehead and gently push your forehead into your palm. Hold for 5 seconds and release. Do 3 sets of 5.   Isometric neck extension: Sit tall, eyes straight ahead, and chin level. Clasp your hands together and place them behind your head. Press the back of your head into your palms. Hold 5 seconds and release. Do 3 sets of 5.   Isometric neck side bend: Sit tall, eyes straight ahead, and chin level. Place the palm of your hand at the side of your temple and press your temple into the palm of your hand. Hold 5 seconds and release. Do 3 sets of 5 on each side.   Head lift: Neck curl: Lie on your back with your knees bent and your feet flat on the floor. Tuck your chin and lift your head toward your chest, keeping your shoulders on the floor. Hold for 5 seconds. Repeat 10 times.   Head lift: Neck side bend: Lie on your right side with your right arm lying straight out. Rest your head on your arm, then lift your head slowly toward your left shoulder. Hold for 5 seconds. Repeat 10 times. Switch to your left side and repeat the exercise, lifting your head toward your right shoulder.   Neck extension on hands and knees: Get on your hands and knees and look down at the floor. Keep your back straight and let your head slowly drop toward your chest. Then tuck your chin slightly and lift your head up until your neck is level with your back. Hold this position for 5 seconds. Repeat 10 times.   Scapular squeeze: While sitting or standing with your arms by your sides, squeeze your shoulder blades together and hold for 5 seconds. Do 3 sets of 10.   Published by [employer].  This content is reviewed periodically and is subject to change as new health information becomes available. The information is intended to inform and educate and is not a replacement for medical evaluation, advice, diagnosis or treatment by a healthcare professional.   Written by Mildred Ruiz, MS, PT,  and Kathie Nettles, PT, [facility], [facility], for [employer].   ? 2010 [facility] and/or its affiliates. All Rights Reserved.   Copyright   [employer] Reference Systems 2011  Adult Health Advisor

## 2017-08-09 ENCOUNTER — DOCUMENTATION ONLY (OUTPATIENT)
Dept: LAB | Facility: CLINIC | Age: 77
End: 2017-08-09

## 2017-08-09 DIAGNOSIS — E87.1 HYPONATREMIA: Primary | ICD-10-CM

## 2017-08-09 DIAGNOSIS — I10 HTN, GOAL BELOW 150/90: ICD-10-CM

## 2017-08-09 NOTE — PROGRESS NOTES
Patient had fasting labs on 1/20/17 is patient needing other labs? Please advise and route back to TC if patient needs to be called to cancel lab.    Ayesha Warner,

## 2017-08-09 NOTE — PROGRESS NOTES
Lab is unable to determine what labs are due at this time. Fasting labs were performed 1/2017.  Please review and order laboratory future orders for patient  upcoming lab appointment on 08/10/17.    Thank you,   Mildred Palm MLT

## 2017-08-10 ENCOUNTER — DOCUMENTATION ONLY (OUTPATIENT)
Dept: LAB | Facility: CLINIC | Age: 77
End: 2017-08-10

## 2017-08-10 DIAGNOSIS — E87.1 HYPONATREMIA: ICD-10-CM

## 2017-08-10 DIAGNOSIS — I10 HTN, GOAL BELOW 150/90: ICD-10-CM

## 2017-08-10 LAB
ANION GAP SERPL CALCULATED.3IONS-SCNC: 7 MMOL/L (ref 3–14)
BUN SERPL-MCNC: 8 MG/DL (ref 7–30)
CALCIUM SERPL-MCNC: 8.9 MG/DL (ref 8.5–10.1)
CHLORIDE SERPL-SCNC: 99 MMOL/L (ref 94–109)
CO2 SERPL-SCNC: 28 MMOL/L (ref 20–32)
CREAT SERPL-MCNC: 0.76 MG/DL (ref 0.66–1.25)
GFR SERPL CREATININE-BSD FRML MDRD: NORMAL ML/MIN/1.7M2
GLUCOSE SERPL-MCNC: 87 MG/DL (ref 70–99)
POTASSIUM SERPL-SCNC: 4.2 MMOL/L (ref 3.4–5.3)
SODIUM SERPL-SCNC: 134 MMOL/L (ref 133–144)

## 2017-08-10 PROCEDURE — 36415 COLL VENOUS BLD VENIPUNCTURE: CPT | Performed by: PHYSICIAN ASSISTANT

## 2017-08-10 PROCEDURE — 80048 BASIC METABOLIC PNL TOTAL CA: CPT | Performed by: PHYSICIAN ASSISTANT

## 2017-08-10 NOTE — PROGRESS NOTES
Patient came in for lab work and we do not have orders. Please place future orders. We are holding the specimens.    Thanks,  Jeannie Arrieta

## 2017-08-15 NOTE — PROGRESS NOTES
SUBJECTIVE:                                                    Markel Padilla is a 77 year old male who presents to clinic today for the following health issues:    Hyperlipidemia Follow-Up      Rate your low fat/cholesterol diet?: good    Taking statin?  Yes, no muscle aches from statin    Other lipid medications/supplements?:  Fish oil/Omega 3, without side effects    Hypertension Follow-up      Outpatient blood pressures are being checked at home.  Results are normal.    Low Salt Diet: low salt      Amount of exercise or physical activity: keeps moving per pt     Problems taking medications regularly: No    Medication side effects: none  Diet: per pt he eats sensible     Problem list and histories reviewed & adjusted, as indicated.  Additional history: as documented    Patient Active Problem List   Diagnosis     GERD (gastroesophageal reflux disease)     Erectile dysfunction     Hyperlipidemia LDL goal <130     Overweight (BMI 25.0-29.9)     Advanced directives, counseling/discussion     Hearing loss     Sleep disturbance     Hyponatremia     Eczema     Gastroesophageal reflux disease, esophagitis presence not specified     Hearing loss, bilateral     Benign essential hypertension     HTN, goal below 150/90     Raynaud's disease without gangrene     Past Surgical History:   Procedure Laterality Date     NO HISTORY OF SURGERY         Social History   Substance Use Topics     Smoking status: Former Smoker     Types: Cigarettes     Quit date: 11/2/1979     Smokeless tobacco: Former User     Quit date: 4/7/1989     Alcohol use Yes      Comment: rare     Family History   Problem Relation Age of Onset     HEART DISEASE Mother      Lipids Mother      C.A.D. Father      age 65     Respiratory Sister      COPD     Hypertension Son      Hypertension Son          Allergies   Allergen Reactions     Nkda [No Known Drug Allergies]      BP Readings from Last 3 Encounters:   08/16/17 136/71   05/16/17 146/76   03/02/17  148/78    Wt Readings from Last 3 Encounters:   08/16/17 163 lb (73.9 kg)   05/16/17 170 lb (77.1 kg)   03/02/17 173 lb (78.5 kg)                  Labs reviewed in EPIC        ROS:  Constitutional, HEENT, cardiovascular, pulmonary, gi and gu systems are negative, except as otherwise noted.      OBJECTIVE:   /71  Pulse 81  Wt 163 lb (73.9 kg)  SpO2 98%  BMI 25.53 kg/m2  Body mass index is 25.53 kg/(m^2).  GENERAL: healthy, alert and no distress  Head: Normocephalic, atraumatic.  Eyes: Conjunctiva clear, non icteric. PERRLA.  Ears: External ears and TMs normal BL.  Nose: Septum midline, nasal mucosa pink and moist. No discharge.  Mouth / Throat: Normal dentition.  No oral lesions. Pharynx non erythematous, tonsils without hypertrophy.  Neck: Supple, no enlarged LN, trachea midline.  Heart: S1 and S2 normal, no murmurs, clicks, gallops or rubs. Regular rate and rhythm.  Chest: Clear; no wheezes or rales.    Diagnostic Test Results:  No results found for this or any previous visit (from the past 24 hour(s)).  Results for orders placed or performed in visit on 08/10/17   Basic metabolic panel   Result Value Ref Range    Sodium 134 133 - 144 mmol/L    Potassium 4.2 3.4 - 5.3 mmol/L    Chloride 99 94 - 109 mmol/L    Carbon Dioxide 28 20 - 32 mmol/L    Anion Gap 7 3 - 14 mmol/L    Glucose 87 70 - 99 mg/dL    Urea Nitrogen 8 7 - 30 mg/dL    Creatinine 0.76 0.66 - 1.25 mg/dL    GFR Estimate >90  Non  GFR Calc   >60 mL/min/1.7m2    GFR Estimate If Black >90   GFR Calc   >60 mL/min/1.7m2    Calcium 8.9 8.5 - 10.1 mg/dL       ASSESSMENT/PLAN:         ICD-10-CM    1. Hyperlipidemia LDL goal <130 E78.5 simvastatin (ZOCOR) 40 MG tablet   2. HTN, goal below 150/90 I10 lisinopril (PRINIVIL/ZESTRIL) 20 MG tablet   meds refilled  Recheck blood pressure in 6 months.     Jonathan Oliver PA-C  [facility]

## 2017-08-16 ENCOUNTER — OFFICE VISIT (OUTPATIENT)
Dept: FAMILY MEDICINE | Facility: CLINIC | Age: 77
End: 2017-08-16
Payer: COMMERCIAL

## 2017-08-16 VITALS
OXYGEN SATURATION: 98 % | SYSTOLIC BLOOD PRESSURE: 136 MMHG | HEART RATE: 81 BPM | DIASTOLIC BLOOD PRESSURE: 71 MMHG | WEIGHT: 163 LBS | BODY MASS INDEX: 25.53 KG/M2

## 2017-08-16 DIAGNOSIS — E78.5 HYPERLIPIDEMIA LDL GOAL <130: ICD-10-CM

## 2017-08-16 DIAGNOSIS — I10 HTN, GOAL BELOW 150/90: ICD-10-CM

## 2017-08-16 PROCEDURE — 99213 OFFICE O/P EST LOW 20 MIN: CPT | Performed by: PHYSICIAN ASSISTANT

## 2017-08-16 RX ORDER — LISINOPRIL 20 MG/1
20 TABLET ORAL DAILY
Qty: 90 TABLET | Refills: 1 | Status: SHIPPED | OUTPATIENT
Start: 2017-08-16 | End: 2018-03-30

## 2017-08-16 RX ORDER — SIMVASTATIN 40 MG
40 TABLET ORAL AT BEDTIME
Qty: 90 TABLET | Refills: 3 | Status: SHIPPED | OUTPATIENT
Start: 2017-08-16 | End: 2018-04-18

## 2017-08-16 NOTE — MR AVS SNAPSHOT
"              After Visit Summary   2017    Markel Padilla    MRN: [id number]           Patient Information     Date Of Birth          1940        Visit Information        Provider Department      2017 11:00 AM Jonathan Oliver PA-C [facility]        Today's Diagnoses     Hyperlipidemia LDL goal <130        HTN, goal below 150/90           Follow-ups after your visit        Who to contact     If you have questions or need follow up information about today's clinic visit or your schedule please contact [facility] directly at [phone].  Normal or non-critical lab and imaging results will be communicated to you by [employer], letter or phone within 4 business days after the clinic has received the results. If you do not hear from us within 7 days, please contact the clinic through [employer] or phone. If you have a critical or abnormal lab result, we will notify you by phone as soon as possible.  Submit refill requests through [employer] or call your pharmacy and they will forward the refill request to us. Please allow 3 business days for your refill to be completed.          Additional Information About Your Visit        MyChart Information     [employer] lets you send messages to your doctor, view your test results, renew your prescriptions, schedule appointments and more. To sign up, go to [email or web address] . Click on \"Log in\" on the left side of the screen, which will take you to the Welcome page. Then click on \"Sign up Now\" on the right side of the page.     You will be asked to enter the access code listed below, as well as some personal information. Please follow the directions to create your username and password.     Your access code is: [email or web address]  Expires: 2017 11:14 AM     Your access code will  in 90 days. If you need help or a new code, please call your [facility] or [phone].        Care EveryWhere ID     This is your Care " EveryWhere ID. This could be used by other organizations to access your [place] medical records  [id number]        Your Vitals Were     Pulse Pulse Oximetry BMI (Body Mass Index)             81 98% 25.53 kg/m2          Blood Pressure from Last 3 Encounters:   08/16/17 136/71   05/16/17 146/76   03/02/17 148/78    Weight from Last 3 Encounters:   08/16/17 163 lb (73.9 kg)   05/16/17 170 lb (77.1 kg)   03/02/17 173 lb (78.5 kg)              Today, you had the following     No orders found for display         Where to get your medicines      These medications were sent to [facility] [address] [facility], [place] - [address]  [address], [facility] [address]     Phone:  [phone]     lisinopril 20 MG tablet    simvastatin 40 MG tablet          Primary Care Provider Office Phone # Fax #    Jonathan Oliver PA-C [phone] [address] [facility] [address]        Equal Access to Services     [facility] : Hadii aad ku hadasho Soomaali, waaxda luqadaha, qaybta kaalmada adeegyada, abdon canales haylornan marleni tinajero . So [facility] [phone].    ATENCIÓN: Si habla español, tiene a pollack disposición servicios gratuitos de asistencia lingüística. [facility] [phone].    We comply with applicable federal civil rights laws and [place] laws. We do not discriminate on the basis of race, color, national origin, age, disability sex, sexual orientation or gender identity.            Thank you!     Thank you for choosing [facility]  for your care. Our goal is always to provide you with excellent care. Hearing back from our patients is one way we can continue to improve our services. Please take a few minutes to complete the written survey that you may receive in the mail after your visit with us. Thank you!             Your Updated Medication List - Protect others around you: Learn how to safely use, store and throw away your medicines  at www.disposemymeds.org.          This list is accurate as of: 8/16/17 11:14 AM.  Always use your most recent med list.                   Brand Name Dispense Instructions for use Diagnosis    aspirin 81 MG tablet      Take 1 tablet by mouth daily.        calcium carbonate 1250 MG tablet    OS-FREDIS 500 mg [place]. [place]    180 tablet    Take 1 tablet (500 mg) by mouth 2 times daily        ferrous sulfate 325 (65 FE) MG tablet    IRON     Take by mouth daily (with breakfast)        fish oil-omega-3 fatty acids 1000 MG capsule      1 tablet daily        GLUCOSAMINE CHONDR 1500 COMPLX PO      1 tablet daily        lisinopril 20 MG tablet    PRINIVIL/ZESTRIL    90 tablet    Take 1 tablet (20 mg) by mouth daily    HTN, goal below 150/90       Multi-vitamin Tabs tablet   Generic drug:  multivitamin, therapeutic with minerals      1 TABLET DAILY        omeprazole 20 MG CR capsule    priLOSEC    90 capsule    TAKE 1 CAPSULE BY MOUTH ONCE DAILY    Gastroesophageal reflux disease, esophagitis presence not specified       simvastatin 40 MG tablet    ZOCOR    90 tablet    Take 1 tablet (40 mg) by mouth At Bedtime    Hyperlipidemia LDL goal <130       triamcinolone 0.5 % cream    KENALOG    30 g    Apply sparingly to affected area three times daily.    Eczema

## 2017-08-16 NOTE — NURSING NOTE
"Chief Complaint   Patient presents with     Lipids     Hypertension       Initial /71  Pulse 81  Wt 163 lb (73.9 kg)  SpO2 98%  BMI 25.53 kg/m2 Estimated body mass index is 25.53 kg/(m^2) as calculated from the following:    Height as of 1/27/16: 5' 7\" (1.702 m).    Weight as of this encounter: 163 lb (73.9 kg).  Medication Reconciliation: complete  Dixie Plascencia CMA    "

## 2017-08-22 NOTE — PROGRESS NOTES
"SUBJECTIVE:   Markel Padilla is a 77 year old male seen here today for his left Shoulder   What happened: no injury      Onset: 3 days    Description:   Character: aching, sore, sharp pains with certain movements    Intensity: mild - much better today     Progression of Symptoms: better    Accompanying Signs & Symptoms:  Other symptoms: loss of strength and range of motion   History:   Previous similar pain: no       Precipitating factors:   Trauma or overuse: no     Alleviating factors:  Improved by: unsure        Therapies Tried and outcome: ibuprofen - no relief. Time? Better now  No known injury. occ sore shoulder in the past.     States shoulder feels much better today \" probably a waste of time to be here\"     ROS:  Negative for constitutional, eye, ear, nose, throat, skin, respiratory, cardiac, and gastrointestinal other than those outlined in the HPI.    PFSH:  Past Medical, social, family histories, medications, and allergies were reviewed and updated.     OBJECTIVE:  /78  Pulse 56  Wt 163 lb (73.9 kg)  SpO2 100%  BMI 25.53 kg/m2  General:  Markel is awake, alert, and cooperative.  NAD.  Left Shoulder Exam: Inspection: no swelling, bruising, discoloration, or obvious deformity or asymmetry  Tender: AC joint  Range of Motion  Active:all normal, rajan   Passive: all normal, rajan.   Strength: rotator cuff strength full  Special tests:  Negative: Alan        ASSESSMENT:     ICD-10-CM    1. Left shoulder pain, unspecified chronicity M25.512        PLAN:   ice or cold packs 20 minutes every 2-3 hrs as needed to relieve pain and swelling, for the first 2 days. Then can apply heat 20 minutes every 2-3 hrs (avoid sleeping on heating pad) there after as needed.   If you can tolerate, start non-steroidal anti-inflammatory medication like  Tylenol can help with pain also.    Active range of motion exercises encouraged  Activity modification trying to avoid activities that cause you pain. "   Follow up as needed     Jonathan Oliver PA-C

## 2017-08-23 ENCOUNTER — OFFICE VISIT (OUTPATIENT)
Dept: FAMILY MEDICINE | Facility: CLINIC | Age: 77
End: 2017-08-23
Payer: COMMERCIAL

## 2017-08-23 VITALS
BODY MASS INDEX: 25.53 KG/M2 | HEART RATE: 56 BPM | OXYGEN SATURATION: 100 % | WEIGHT: 163 LBS | SYSTOLIC BLOOD PRESSURE: 148 MMHG | DIASTOLIC BLOOD PRESSURE: 78 MMHG

## 2017-08-23 DIAGNOSIS — M25.512 LEFT SHOULDER PAIN, UNSPECIFIED CHRONICITY: Primary | ICD-10-CM

## 2017-08-23 PROCEDURE — 99213 OFFICE O/P EST LOW 20 MIN: CPT | Performed by: PHYSICIAN ASSISTANT

## 2017-08-23 NOTE — MR AVS SNAPSHOT
"              After Visit Summary   2017    Markel Padilla    MRN: [id number]           Patient Information     Date Of Birth          1940        Visit Information        Provider Department      2017 9:20 AM Jonathan Oliver PA-C [facility]         Follow-ups after your visit        Who to contact     If you have questions or need follow up information about today's clinic visit or your schedule please contact [facility] directly at [phone].  Normal or non-critical lab and imaging results will be communicated to you by MyChart, letter or phone within 4 business days after the clinic has received the results. If you do not hear from us within 7 days, please contact the clinic through [employer] or phone. If you have a critical or abnormal lab result, we will notify you by phone as soon as possible.  Submit refill requests through [employer] or call your pharmacy and they will forward the refill request to us. Please allow 3 business days for your refill to be completed.          Additional Information About Your Visit        MyChart Information     [employer] lets you send messages to your doctor, view your test results, renew your prescriptions, schedule appointments and more. To sign up, go to [email or web address] . Click on \"Log in\" on the left side of the screen, which will take you to the Welcome page. Then click on \"Sign up Now\" on the right side of the page.     You will be asked to enter the access code listed below, as well as some personal information. Please follow the directions to create your username and password.     Your access code is: [email or web address]  Expires: 2017 11:14 AM     Your access code will  in 90 days. If you need help or a new code, please call your [place] clinic or [phone].        Care EveryWhere ID     This is your Care EveryWhere ID. This could be used by other organizations to access your [place] medical " records  [id number]        Your Vitals Were     Pulse Pulse Oximetry BMI (Body Mass Index)             56 100% 25.53 kg/m2          Blood Pressure from Last 3 Encounters:   08/23/17 172/78   08/16/17 136/71   05/16/17 146/76    Weight from Last 3 Encounters:   08/23/17 163 lb (73.9 kg)   08/16/17 163 lb (73.9 kg)   05/16/17 170 lb (77.1 kg)              Today, you had the following     No orders found for display       Primary Care Provider Office Phone # Fax #    Jonathan Oliver PA-C [phone] [address] [facility] [address]        Equal Access to Services     CHELITA CHURCH : Hadii sam saavedrao Olivia, waaxda luqadaha, qaybta kaalmada adeegyada, abdon tinajero . So [facility] [phone].    ATENCIÓN: Si habla español, tiene a pollack disposición servicios gratuitos de asistencia lingüística. Ll[facility] [phone].    We comply with applicable federal civil rights laws and [place] laws. We do not discriminate on the basis of race, color, national origin, age, disability sex, sexual orientation or gender identity.            Thank you!     Thank you for choosing [facility]  for your care. Our goal is always to provide you with excellent care. Hearing back from our patients is one way we can continue to improve our services. Please take a few minutes to complete the written survey that you may receive in the mail after your visit with us. Thank you!             Your Updated Medication List - Protect others around you: Learn how to safely use, store and throw away your medicines at www.disposemymeds.org.          This list is accurate as of: 8/23/17  9:31 AM.  Always use your most recent med list.                   Brand Name Dispense Instructions for use Diagnosis    aspirin 81 MG tablet      Take 1 tablet by mouth daily.        calcium carbonate 1250 MG tablet    OS-FREDIS 500 mg [place]. Ca    180 tablet    Take 1 tablet (500 mg) by mouth 2 times daily         ferrous sulfate 325 (65 FE) MG tablet    IRON     Take by mouth daily (with breakfast)        fish oil-omega-3 fatty acids 1000 MG capsule      1 tablet daily        GLUCOSAMINE CHONDR 1500 COMPLX PO      1 tablet daily        lisinopril 20 MG tablet    PRINIVIL/ZESTRIL    90 tablet    Take 1 tablet (20 mg) by mouth daily    HTN, goal below 150/90       Multi-vitamin Tabs tablet   Generic drug:  multivitamin, therapeutic with minerals      1 TABLET DAILY        omeprazole 20 MG CR capsule    priLOSEC    90 capsule    TAKE 1 CAPSULE BY MOUTH ONCE DAILY    Gastroesophageal reflux disease, esophagitis presence not specified       simvastatin 40 MG tablet    ZOCOR    90 tablet    Take 1 tablet (40 mg) by mouth At Bedtime    Hyperlipidemia LDL goal <130       triamcinolone 0.5 % cream    KENALOG    30 g    Apply sparingly to affected area three times daily.    Eczema

## 2017-10-02 DIAGNOSIS — I10 HTN, GOAL BELOW 150/90: ICD-10-CM

## 2017-10-04 RX ORDER — LISINOPRIL 20 MG/1
TABLET ORAL
Qty: 90 TABLET | Refills: 0 | OUTPATIENT
Start: 2017-10-04

## 2018-01-07 DIAGNOSIS — K21.9 GASTROESOPHAGEAL REFLUX DISEASE, ESOPHAGITIS PRESENCE NOT SPECIFIED: ICD-10-CM

## 2018-03-30 DIAGNOSIS — I10 HTN, GOAL BELOW 150/90: ICD-10-CM

## 2018-03-30 RX ORDER — LISINOPRIL 20 MG/1
TABLET ORAL
Qty: 30 TABLET | Refills: 0 | Status: SHIPPED | OUTPATIENT
Start: 2018-03-30 | End: 2018-04-18

## 2018-03-30 NOTE — TELEPHONE ENCOUNTER
#30 only as patient is overdue for appointment     TC, patient due for:  OV for BP     Milena Stacy RN

## 2018-04-04 ENCOUNTER — DOCUMENTATION ONLY (OUTPATIENT)
Dept: LAB | Facility: CLINIC | Age: 78
End: 2018-04-04

## 2018-04-04 DIAGNOSIS — I10 BENIGN ESSENTIAL HYPERTENSION: ICD-10-CM

## 2018-04-04 DIAGNOSIS — E78.5 HYPERLIPIDEMIA LDL GOAL <130: Primary | ICD-10-CM

## 2018-04-04 NOTE — PROGRESS NOTES
Need previsit labs-See pending orders and close encounter./Ayesha Warner,         BP Chol check 4/18/18

## 2018-04-04 NOTE — PROGRESS NOTES
Patient has an upcoming previsit appointment on 04/11/2018. Please review pended orders and add additional orders if needed.     Thank you,   Zulma Banerjee

## 2018-04-11 DIAGNOSIS — E78.5 HYPERLIPIDEMIA LDL GOAL <130: ICD-10-CM

## 2018-04-11 DIAGNOSIS — I10 BENIGN ESSENTIAL HYPERTENSION: ICD-10-CM

## 2018-04-11 LAB
ANION GAP SERPL CALCULATED.3IONS-SCNC: 8 MMOL/L (ref 3–14)
BUN SERPL-MCNC: 8 MG/DL (ref 7–30)
CALCIUM SERPL-MCNC: 9.1 MG/DL (ref 8.5–10.1)
CHLORIDE SERPL-SCNC: 101 MMOL/L (ref 94–109)
CHOLEST SERPL-MCNC: 157 MG/DL
CO2 SERPL-SCNC: 26 MMOL/L (ref 20–32)
CREAT SERPL-MCNC: 0.79 MG/DL (ref 0.66–1.25)
GFR SERPL CREATININE-BSD FRML MDRD: >90 ML/MIN/1.7M2
GLUCOSE SERPL-MCNC: 90 MG/DL (ref 70–99)
HDLC SERPL-MCNC: 62 MG/DL
LDLC SERPL CALC-MCNC: 78 MG/DL
NONHDLC SERPL-MCNC: 95 MG/DL
POTASSIUM SERPL-SCNC: 4.5 MMOL/L (ref 3.4–5.3)
SODIUM SERPL-SCNC: 135 MMOL/L (ref 133–144)
TRIGL SERPL-MCNC: 86 MG/DL

## 2018-04-11 PROCEDURE — 36415 COLL VENOUS BLD VENIPUNCTURE: CPT | Performed by: PHYSICIAN ASSISTANT

## 2018-04-11 PROCEDURE — 80061 LIPID PANEL: CPT | Performed by: PHYSICIAN ASSISTANT

## 2018-04-11 PROCEDURE — 80048 BASIC METABOLIC PNL TOTAL CA: CPT | Performed by: PHYSICIAN ASSISTANT

## 2018-04-11 NOTE — LETTER
April 12, 2018    Markel Padilla  [address] [place] [address]            Dear Markel,    All of your labs were normal for you.  Below is a copy of the results.  It was a pleasure to see you at your last appointment.    If you have any questions or concerns, please call myself or my nurse at [phone].    Sincerely,    Jonathan Oliver PA-C /meghan    Results for orders placed or performed in visit on 04/11/18   **Basic metabolic panel FUTURE anytime   Result Value Ref Range    Sodium 135 133 - 144 mmol/L    Potassium 4.5 3.4 - 5.3 mmol/L    Chloride 101 94 - 109 mmol/L    Carbon Dioxide 26 20 - 32 mmol/L    Anion Gap 8 3 - 14 mmol/L    Glucose 90 70 - 99 mg/dL    Urea Nitrogen 8 7 - 30 mg/dL    Creatinine 0.79 0.66 - 1.25 mg/dL    GFR Estimate >90 >60 mL/min/1.7m2    GFR Estimate If Black >90 >60 mL/min/1.7m2    Calcium 9.1 8.5 - 10.1 mg/dL   Lipid panel reflex to direct LDL Fasting   Result Value Ref Range    Cholesterol 157 <200 mg/dL    Triglycerides 86 <150 mg/dL    HDL Cholesterol 62 >39 mg/dL    LDL Cholesterol Calculated 78 <100 mg/dL    Non HDL Cholesterol 95 <130 mg/dL

## 2018-04-12 NOTE — PROGRESS NOTES
MrMassiel Padilla,    All of your labs were normal for you.    Please contact the clinic if you have additional questions.  Thank you.    Sincerely,    Jonathan Oliver PA-C

## 2018-04-17 NOTE — PROGRESS NOTES
SUBJECTIVE:                                                    Markel Padilla is a 77 year old male who presents to clinic today for the following health issues:    Hyperlipidemia Follow-Up      Rate your low fat/cholesterol diet?: not monitoring fat    Taking statin?  Yes,    Other lipid medications/supplements?:  Fish oil/Omega 3,  without side effects    Hypertension Follow-up        Outpatient blood pressures are being checked at home.  Results are <140/90 .    Low Salt Diet: not monitoring salt      Amount of exercise or physical activity: 1-2 walks a day    Problems taking medications regularly: No    Medication side effects: none    Diet: regular (no restrictions)    Discuss Shingles Vaccine     This is a 77-yo male who presents for a blood pressure and cholesterol follow-up. He is taking lisinopril 20 mg. He is tolerating this well. His blood pressure is stable. His kidney function is stable.     He is taking simvastatin 40 mg daily and ASA 81 mg.     He denies chest pain, shortness of breath, leg swelling, headache, dizziness, or change in appetite.     He is taking omeprazole 20 mg daily for reflux. He denies belching, abdominal or sternal chest pain.     The patient also inquires about possible sleep apnea. He states that he woke up gasping for air while sleeping last week. He denies snoring or fatigue. He reports occasional daytime sleepiness.     He walks twice daily either outside or on his home treadmill.     Problem list and histories reviewed & adjusted, as indicated.  Additional history: as documented        Patient Active Problem List   Diagnosis     GERD (gastroesophageal reflux disease)     Erectile dysfunction     Hyperlipidemia LDL goal <130     Overweight (BMI 25.0-29.9)     Advanced directives, counseling/discussion     Hearing loss     Sleep disturbance     Hyponatremia     Eczema     Gastroesophageal reflux disease, esophagitis presence not specified     Hearing loss, bilateral      Benign essential hypertension     HTN, goal below 150/90     Raynaud's disease without gangrene     Past Surgical History:   Procedure Laterality Date     NO HISTORY OF SURGERY         Social History   Substance Use Topics     Smoking status: Former Smoker     Types: Cigarettes     Quit date: 11/2/1979     Smokeless tobacco: Current User     Types: Chew     Last attempt to quit: 4/7/1989     Alcohol use Yes      Comment: rare     Family History   Problem Relation Age of Onset     HEART DISEASE Mother      Lipids Mother      C.A.D. Father      age 65     Respiratory Sister      COPD     Hypertension Son      Hypertension Son          Current Outpatient Prescriptions   Medication Sig Dispense Refill     aspirin 81 MG tablet Take 1 tablet by mouth daily.       calcium carbonate (OS-FREDIS 500 MG [place]. [place]) 500 MG tablet Take 1 tablet (500 mg) by mouth 2 times daily 180 tablet 3     ferrous sulfate 325 (65 FE) MG tablet Take by mouth daily (with breakfast)       FISH OIL 1000 MG OR CAPS 1 tablet daily       GLUCOSAMINE CHONDR 1500 COMPLX OR 1 tablet daily       lisinopril (PRINIVIL/ZESTRIL) 20 MG tablet Take 0.5 tablets (10 mg) by mouth daily 90 tablet 3     MULTI-VITAMIN OR TABS 1 TABLET DAILY       omeprazole (PRILOSEC) 20 MG CR capsule Take 1 capsule (20 mg) by mouth daily 90 capsule 3     simvastatin (ZOCOR) 40 MG tablet Take 1 tablet (40 mg) by mouth At Bedtime 90 tablet 3     triamcinolone (KENALOG) 0.5 % cream Apply sparingly to affected area three times daily. 30 g 0     [DISCONTINUED] lisinopril (PRINIVIL/ZESTRIL) 20 MG tablet TAKE 1 TABLET(20 MG) BY MOUTH DAILY 30 tablet 0     [DISCONTINUED] simvastatin (ZOCOR) 40 MG tablet Take 1 tablet (40 mg) by mouth At Bedtime 90 tablet 3     BP Readings from Last 3 Encounters:   04/18/18 138/70   08/23/17 148/78   08/16/17 136/71    Wt Readings from Last 3 Encounters:   04/18/18 175 lb 12.8 oz (79.7 kg)   08/23/17 163 lb (73.9 kg)   08/16/17 163 lb (73.9 kg)     "     ROS:  Constitutional, HEENT, cardiovascular, pulmonary, gi and gu systems are negative, except as otherwise noted.    OBJECTIVE:     /70  Pulse 58  Temp 96.5  F (35.8  C) (Oral)  Resp 20  Ht 5' 4\" (1.626 m)  Wt 175 lb 12.8 oz (79.7 kg)  SpO2 100%  BMI 30.18 kg/m2  Body mass index is 30.18 kg/(m^2).  GENERAL: healthy, alert and no distress  EYES: Eyes grossly normal to inspection, PERRL and conjunctivae and sclerae normal  NECK: no adenopathy, no asymmetry, masses, or scars and thyroid normal to palpation. No bruits on auscultation of carotids bilaterally.   RESP: breathing is non-labored. lungs clear to auscultation - no rales, rhonchi or wheezes  CV: regular rate and rhythm, normal S1 S2, no S3 or S4, no murmur, click or rub, no peripheral edema and peripheral pulses strong  MS: no gross musculoskeletal defects noted, no edema  SKIN: no suspicious lesions or rashes  NEURO: Normal strength and tone, mentation intact and speech normal  PSYCH: mentation appears normal, affect normal/bright    Diagnostic Test Results:  No results found for this or any previous visit (from the past 24 hour(s)).    ASSESSMENT/PLAN:       ICD-10-CM    1. HTN, goal below 150/90 I10 lisinopril (PRINIVIL/ZESTRIL) 20 MG tablet   2. Hyperlipidemia LDL goal <130 E78.5 simvastatin (ZOCOR) 40 MG tablet   3. Gastroesophageal reflux disease, esophagitis presence not specified K21.9 omeprazole (PRILOSEC) 20 MG CR capsule     1-2. Continue lisinopril and simvastatin. Encouraged regular exercise, a balanced diet, and increased water intake.   3. Continue omeprazole.   4. We discussed the new shingles vaccine. He is going to look into this.   5. We discussed the signs of sleep apnea per the patient's inquiry. We will continue to monitor this.     Follow-up in 6 months or sooner as needed.    Carie FINK PA student from [facility], acted as a scribe.   The student acted as a scribe and the encounter documented above was " completely performed by myself and the documentation reflects the work I have performed today.     Jonathan Oliver PA-C  [facility] [place]  Answers for HPI/ROS submitted by the patient on 4/18/2018   Chronic problems general questions HPI Form  Low fat/chol diet rating:: Not monitoring fat  Taking Statins:: YES  Lipid Medications or Supplements:: Fish oil/Omega 3, without side effects.

## 2018-04-18 ENCOUNTER — OFFICE VISIT (OUTPATIENT)
Dept: FAMILY MEDICINE | Facility: CLINIC | Age: 78
End: 2018-04-18
Payer: COMMERCIAL

## 2018-04-18 VITALS
WEIGHT: 175.8 LBS | RESPIRATION RATE: 20 BRPM | SYSTOLIC BLOOD PRESSURE: 138 MMHG | DIASTOLIC BLOOD PRESSURE: 70 MMHG | HEART RATE: 58 BPM | HEIGHT: 64 IN | BODY MASS INDEX: 30.01 KG/M2 | TEMPERATURE: 96.5 F | OXYGEN SATURATION: 100 %

## 2018-04-18 DIAGNOSIS — I10 HTN, GOAL BELOW 150/90: ICD-10-CM

## 2018-04-18 DIAGNOSIS — E78.5 HYPERLIPIDEMIA LDL GOAL <130: ICD-10-CM

## 2018-04-18 DIAGNOSIS — K21.9 GASTROESOPHAGEAL REFLUX DISEASE, ESOPHAGITIS PRESENCE NOT SPECIFIED: ICD-10-CM

## 2018-04-18 PROCEDURE — 99213 OFFICE O/P EST LOW 20 MIN: CPT | Performed by: PHYSICIAN ASSISTANT

## 2018-04-18 RX ORDER — LISINOPRIL 20 MG/1
10 TABLET ORAL DAILY
Qty: 90 TABLET | Refills: 3 | Status: SHIPPED | OUTPATIENT
Start: 2018-04-18 | End: 2018-05-21

## 2018-04-18 RX ORDER — SIMVASTATIN 40 MG
40 TABLET ORAL AT BEDTIME
Qty: 90 TABLET | Refills: 3 | Status: SHIPPED | OUTPATIENT
Start: 2018-04-18 | End: 2019-05-16

## 2018-04-18 NOTE — MR AVS SNAPSHOT
"              After Visit Summary   2018    Markel Padilla    MRN: [id number]           Patient Information     Date Of Birth          1940        Visit Information        Provider Department      2018 9:00 AM Jonathan Oliver PA-C [facility]        Today's Diagnoses     HTN, goal below 150/90        Gastroesophageal reflux disease, esophagitis presence not specified        Hyperlipidemia LDL goal <130           Follow-ups after your visit        Who to contact     If you have questions or need follow up information about today's clinic visit or your schedule please contact [facility] directly at [phone].  Normal or non-critical lab and imaging results will be communicated to you by [employer], letter or phone within 4 business days after the clinic has received the results. If you do not hear from us within 7 days, please contact the clinic through [employer] or phone. If you have a critical or abnormal lab result, we will notify you by phone as soon as possible.  Submit refill requests through [employer] or call your pharmacy and they will forward the refill request to us. Please allow 3 business days for your refill to be completed.          Additional Information About Your Visit        MyChart Information     [employer] lets you send messages to your doctor, view your test results, renew your prescriptions, schedule appointments and more. To sign up, go to [email or web address] . Click on \"Log in\" on the left side of the screen, which will take you to the Welcome page. Then click on \"Sign up Now\" on the right side of the page.     You will be asked to enter the access code listed below, as well as some personal information. Please follow the directions to create your username and password.     Your access code is: [email or web address]  Expires: 2018  9:29 AM     Your access code will  in 90 days. If you need help or a new code, please call your [facility] " "or [phone].        Care EveryWhere ID     This is your Care EveryWhere ID. This could be used by other organizations to access your [place] medical records  [id number]        Your Vitals Were     Pulse Temperature Respirations Height Pulse Oximetry BMI (Body Mass Index)    58 96.5  F (35.8  C) (Oral) 20 5' 4\" (1.626 m) 100% 30.18 kg/m2       Blood Pressure from Last 3 Encounters:   04/18/18 138/70   08/23/17 148/78   08/16/17 136/71    Weight from Last 3 Encounters:   04/18/18 175 lb 12.8 oz (79.7 kg)   08/23/17 163 lb (73.9 kg)   08/16/17 163 lb (73.9 kg)              Today, you had the following     No orders found for display         Today's Medication Changes          These changes are accurate as of 4/18/18  9:29 AM.  If you have any questions, ask your nurse or doctor.               These medicines have changed or have updated prescriptions.        Dose/Directions    lisinopril 20 MG tablet   Commonly known as:  PRINIVIL/ZESTRIL   This may have changed:  See the new instructions.   Used for:  HTN, goal below 150/90   Changed by:  Jonathan Oliver PA-C        Dose:  10 mg   Take 0.5 tablets (10 mg) by mouth daily   Quantity:  90 tablet   Refills:  3       omeprazole 20 MG CR capsule   Commonly known as:  priLOSEC   This may have changed:  See the new instructions.   Used for:  Gastroesophageal reflux disease, esophagitis presence not specified   Changed by:  Jonathan Oliver PA-C        Dose:  20 mg   Take 1 capsule (20 mg) by mouth daily   Quantity:  90 capsule   Refills:  3            Where to get your medicines      These medications were sent to [facility] [id number] [place] [address] [facility] [address]  [address] [facility] [address] [place] [address]     Phone:  [phone]     lisinopril 20 MG tablet    omeprazole 20 MG CR capsule    simvastatin 40 MG tablet                Primary Care Provider Office Phone # Fax #    Jonathan Oliver PA-C [phone] " [phone]       [address] [facility] [address]        Equal Access to Services     CHELITA CHURCH : Hadii sam garrido brooklynn Sofrancis, waaxda luqadaha, qaybta kaalmada adonayheidi, waxolga ally harvinderbruno uriasraisa hannakellyolman arzola. So [facility] [phone].    ATENCIÓN: Si habla español, tiene a pollack disposición servicios gratuitos de asistencia lingüística. Llame al [phone].    We comply with applicable federal civil rights laws and [place] laws. We do not discriminate on the basis of race, color, national origin, age, disability, sex, sexual orientation, or gender identity.            Thank you!     Thank you for choosing [facility]  for your care. Our goal is always to provide you with excellent care. Hearing back from our patients is one way we can continue to improve our services. Please take a few minutes to complete the written survey that you may receive in the mail after your visit with us. Thank you!             Your Updated Medication List - Protect others around you: Learn how to safely use, store and throw away your medicines at www.disposemymeds.org.          This list is accurate as of 4/18/18  9:29 AM.  Always use your most recent med list.                   Brand Name Dispense Instructions for use Diagnosis    aspirin 81 MG tablet      Take 1 tablet by mouth daily.        calcium carbonate 1250 MG tablet    OS-FREDIS 500 mg [place]. Ca    180 tablet    Take 1 tablet (500 mg) by mouth 2 times daily        ferrous sulfate 325 (65 Fe) MG tablet    IRON     Take by mouth daily (with breakfast)        fish oil-omega-3 fatty acids 1000 MG capsule      1 tablet daily        GLUCOSAMINE CHONDR 1500 COMPLX PO      1 tablet daily        lisinopril 20 MG tablet    PRINIVIL/ZESTRIL    90 tablet    Take 0.5 tablets (10 mg) by mouth daily    HTN, goal below 150/90       Multi-vitamin Tabs tablet   Generic drug:  multivitamin, therapeutic with minerals      1 TABLET DAILY        omeprazole 20 MG CR capsule     priLOSEC    90 capsule    Take 1 capsule (20 mg) by mouth daily    Gastroesophageal reflux disease, esophagitis presence not specified       simvastatin 40 MG tablet    ZOCOR    90 tablet    Take 1 tablet (40 mg) by mouth At Bedtime    Hyperlipidemia LDL goal <130       triamcinolone 0.5 % cream    KENALOG    30 g    Apply sparingly to affected area three times daily.    Eczema

## 2018-05-21 ENCOUNTER — TELEPHONE (OUTPATIENT)
Dept: FAMILY MEDICINE | Facility: CLINIC | Age: 78
End: 2018-05-21

## 2018-05-21 DIAGNOSIS — I10 HTN, GOAL BELOW 150/90: ICD-10-CM

## 2018-05-21 RX ORDER — LISINOPRIL 10 MG/1
10 TABLET ORAL DAILY
Qty: 90 TABLET | Refills: 1 | Status: SHIPPED | OUTPATIENT
Start: 2018-05-21 | End: 2018-11-08

## 2018-05-21 NOTE — TELEPHONE ENCOUNTER
4/18/18, change in dose from Lisinopril 20 mg to decrease to 10 mg.  Patient was to cut tablet in half, however patient states due to the size of the tablet and his hands he is unable to do this.  Patient is requesting a Lisinopril 10 mg tablet.    Per Verbal Orders, Jonathan Oliver PA-C, a new prescription for Lisinopril was sent to patients pharmacy.    Verbalized good understanding.     Per protocol, will route encounter to be cosigned by provider for Verbal Orders.  Katelynn Elaine RN

## 2018-05-21 NOTE — TELEPHONE ENCOUNTER
Patient calling states his lisinopril was increased from 20 mg to 10 mg . States was told to cut med in half but this is not working for him. Would like nurse to call to discuss options.

## 2018-10-26 ENCOUNTER — DOCUMENTATION ONLY (OUTPATIENT)
Dept: LAB | Facility: CLINIC | Age: 78
End: 2018-10-26

## 2018-10-26 DIAGNOSIS — E78.5 HYPERLIPIDEMIA LDL GOAL <130: Primary | ICD-10-CM

## 2018-10-26 DIAGNOSIS — I10 HTN, GOAL BELOW 150/90: ICD-10-CM

## 2018-10-26 NOTE — PROGRESS NOTES
..Please place or confirm orders for upcoming lab appointment on 10.30.18    Thanks  Jeannie Arrieta

## 2018-10-26 NOTE — PROGRESS NOTES
Please review lab orders sign and close encounter. Rose Valentine TC    Pvl 10/30/18    BP & Chol ck 11/8/18

## 2018-10-30 ENCOUNTER — DOCUMENTATION ONLY (OUTPATIENT)
Dept: LAB | Facility: CLINIC | Age: 78
End: 2018-10-30

## 2018-10-30 DIAGNOSIS — I10 HTN, GOAL BELOW 150/90: ICD-10-CM

## 2018-10-30 DIAGNOSIS — E78.5 HYPERLIPIDEMIA LDL GOAL <130: ICD-10-CM

## 2018-10-30 DIAGNOSIS — E78.5 HYPERLIPIDEMIA LDL GOAL <130: Primary | ICD-10-CM

## 2018-10-30 LAB
ANION GAP SERPL CALCULATED.3IONS-SCNC: 10 MMOL/L (ref 3–14)
BUN SERPL-MCNC: 7 MG/DL (ref 7–30)
CALCIUM SERPL-MCNC: 8.8 MG/DL (ref 8.5–10.1)
CHLORIDE SERPL-SCNC: 103 MMOL/L (ref 94–109)
CHOLEST SERPL-MCNC: 144 MG/DL
CO2 SERPL-SCNC: 25 MMOL/L (ref 20–32)
CREAT SERPL-MCNC: 0.78 MG/DL (ref 0.66–1.25)
GFR SERPL CREATININE-BSD FRML MDRD: >90 ML/MIN/1.7M2
GLUCOSE SERPL-MCNC: 94 MG/DL (ref 70–99)
HDLC SERPL-MCNC: 64 MG/DL
LDLC SERPL CALC-MCNC: 64 MG/DL
NONHDLC SERPL-MCNC: 80 MG/DL
POTASSIUM SERPL-SCNC: 4.5 MMOL/L (ref 3.4–5.3)
SODIUM SERPL-SCNC: 138 MMOL/L (ref 133–144)
TRIGL SERPL-MCNC: 82 MG/DL

## 2018-10-30 PROCEDURE — 36415 COLL VENOUS BLD VENIPUNCTURE: CPT | Performed by: PHYSICIAN ASSISTANT

## 2018-10-30 PROCEDURE — 80048 BASIC METABOLIC PNL TOTAL CA: CPT | Performed by: PHYSICIAN ASSISTANT

## 2018-10-30 PROCEDURE — 80061 LIPID PANEL: CPT | Performed by: PHYSICIAN ASSISTANT

## 2018-10-30 NOTE — PROGRESS NOTES
Patient was in for fasting labs today 10/30/18. He was not fasting due to only having gum. Please review and or place future orders.    Thank you,  Zara dill

## 2018-10-30 NOTE — PROGRESS NOTES
Please review lab orders sign and close encounter. Rose ROCKWELL    Labs were made into future    Bp & Chol ck 11/8/18

## 2018-10-30 NOTE — LETTER
October 31, 2018    Markel Padilla  [address]  [place] [address]          Mr. Padilla,     All of your labs were normal for you.     Please contact the clinic if you have additional questions.  Thank you.     Sincerely,     Jonathan Oliver PA-C/vasquez    Results for orders placed or performed in visit on 10/30/18   Lipid panel reflex to direct LDL Fasting   Result Value Ref Range    Cholesterol 144 <200 mg/dL    Triglycerides 82 <150 mg/dL    HDL Cholesterol 64 >39 mg/dL    LDL Cholesterol Calculated 64 <100 mg/dL    Non HDL Cholesterol 80 <130 mg/dL   **Basic metabolic panel FUTURE anytime   Result Value Ref Range    Sodium 138 133 - 144 mmol/L    Potassium 4.5 3.4 - 5.3 mmol/L    Chloride 103 94 - 109 mmol/L    Carbon Dioxide 25 20 - 32 mmol/L    Anion Gap 10 3 - 14 mmol/L    Glucose 94 70 - 99 mg/dL    Urea Nitrogen 7 7 - 30 mg/dL    Creatinine 0.78 0.66 - 1.25 mg/dL    GFR Estimate >90 >60 mL/min/1.7m2    GFR Estimate If Black >90 >60 mL/min/1.7m2    Calcium 8.8 8.5 - 10.1 mg/dL

## 2018-11-07 NOTE — PROGRESS NOTES
SUBJECTIVE:                                                    Markel Padilla is a 78 year old male who presents to clinic today for the following health issues:    Hyperlipidemia Follow-Up      Rate your low fat/cholesterol diet?: good    Taking statin?  Yes, no muscle aches from statin    Other lipid medications/supplements?:  none    Hypertension Follow-up      Outpatient blood pressures are being checked at home.  Results are normal range.    Low Salt Diet: low salt      Amount of exercise or physical activity: 1-2 walks a day     Problems taking medications regularly: No    Medication side effects: none    Diet: low salt and low fat/cholesterol    Problem list and histories reviewed & adjusted, as indicated.  Additional history: as documented      Patient Active Problem List   Diagnosis     GERD (gastroesophageal reflux disease)     Erectile dysfunction     Hyperlipidemia LDL goal <130     Overweight (BMI 25.0-29.9)     Advanced directives, counseling/discussion     Hearing loss     Sleep disturbance     Eczema     Gastroesophageal reflux disease, esophagitis presence not specified     Hearing loss, bilateral     HTN, goal below 150/90     Raynaud's disease without gangrene     Past Surgical History:   Procedure Laterality Date     NO HISTORY OF SURGERY         Social History   Substance Use Topics     Smoking status: Former Smoker     Types: Cigarettes     Quit date: 11/2/1979     Smokeless tobacco: Current User     Types: Chew     Last attempt to quit: 4/7/1989     Alcohol use Yes      Comment: rare     Family History   Problem Relation Age of Onset     HEART DISEASE Mother      Lipids Mother      C.A.D. Father      age 65     Respiratory Sister      COPD     Hypertension Son      Hypertension Son          Current Outpatient Prescriptions   Medication Sig Dispense Refill     aspirin 81 MG tablet Take 1 tablet by mouth daily.       calcium carbonate (OS-FREDIS 500 MG [place]. [place]) 500 MG tablet Take 1 tablet (500  "mg) by mouth 2 times daily 180 tablet 3     ferrous sulfate 325 (65 FE) MG tablet Take by mouth daily (with breakfast)       FISH OIL 1000 MG OR CAPS 1 tablet daily       GLUCOSAMINE CHONDR 1500 COMPLX OR 1 tablet daily       lisinopril (PRINIVIL/ZESTRIL) 10 MG tablet Take 1 tablet (10 mg) by mouth daily 90 tablet 1     MULTI-VITAMIN OR TABS 1 TABLET DAILY       omeprazole (PRILOSEC) 20 MG CR capsule Take 1 capsule (20 mg) by mouth daily 90 capsule 3     simvastatin (ZOCOR) 40 MG tablet Take 1 tablet (40 mg) by mouth At Bedtime 90 tablet 3     triamcinolone (KENALOG) 0.5 % cream Apply sparingly to affected area three times daily. 30 g 0     [DISCONTINUED] lisinopril (PRINIVIL/ZESTRIL) 10 MG tablet Take 1 tablet (10 mg) by mouth daily 90 tablet 1     Allergies   Allergen Reactions     Nkda [No Known Drug Allergies]      BP Readings from Last 3 Encounters:   11/08/18 136/78   04/18/18 138/70   08/23/17 148/78    Wt Readings from Last 3 Encounters:   11/08/18 173 lb (78.5 kg)   04/18/18 175 lb 12.8 oz (79.7 kg)   08/23/17 163 lb (73.9 kg)                  Labs reviewed in EPIC    ROS:  Constitutional, HEENT, cardiovascular, pulmonary, gi and gu systems are negative, except as otherwise noted.    OBJECTIVE:     /78  Pulse 71  Temp 97.6  F (36.4  C) (Oral)  Resp 14  Ht 5' 4\" (1.626 m)  Wt 173 lb (78.5 kg)  SpO2 99%  BMI 29.7 kg/m2  Body mass index is 29.7 kg/(m^2).  GENERAL: healthy, alert and no distress  RESP: lungs clear to auscultation - no rales, rhonchi or wheezes  CV: regular rate and rhythm, normal S1 S2, no S3 or S4, no murmur, click or rub, no peripheral edema and peripheral pulses strong  ABDOMEN: soft, nontender, no hepatosplenomegaly, no masses and bowel sounds normal  MS: no gross musculoskeletal defects noted, no edema    Diagnostic Test Results:  No results found for this or any previous visit (from the past 24 hour(s)).  Results for orders placed or performed in visit on 10/30/18   Lipid " panel reflex to direct LDL Fasting   Result Value Ref Range    Cholesterol 144 <200 mg/dL    Triglycerides 82 <150 mg/dL    HDL Cholesterol 64 >39 mg/dL    LDL Cholesterol Calculated 64 <100 mg/dL    Non HDL Cholesterol 80 <130 mg/dL   **Basic metabolic panel FUTURE anytime   Result Value Ref Range    Sodium 138 133 - 144 mmol/L    Potassium 4.5 3.4 - 5.3 mmol/L    Chloride 103 94 - 109 mmol/L    Carbon Dioxide 25 20 - 32 mmol/L    Anion Gap 10 3 - 14 mmol/L    Glucose 94 70 - 99 mg/dL    Urea Nitrogen 7 7 - 30 mg/dL    Creatinine 0.78 0.66 - 1.25 mg/dL    GFR Estimate >90 >60 mL/min/1.7m2    GFR Estimate If Black >90 >60 mL/min/1.7m2    Calcium 8.8 8.5 - 10.1 mg/dL      ASSESSMENT/PLAN:       ICD-10-CM    1. HTN, goal below 150/90 I10 lisinopril (PRINIVIL/ZESTRIL) 10 MG tablet   2. Hyperlipidemia LDL goal <130 E78.5    3. Gastroesophageal reflux disease, esophagitis presence not specified K21.9    stable ok for refills.  Labs reviewed.   Recheck blood pressure in 6 months.     The 10-year ASCVD risk score (Juanito DC Jr, et al., 2013) is: 32.4%    Values used to calculate the score:      Age: 78 years      Sex: Male      Is Non- : No      Diabetic: No      Tobacco smoker: No      Systolic Blood Pressure: 136 mmHg      Is BP treated: Yes      HDL Cholesterol: 64 mg/dL      Total Cholesterol: 144 mg/dL   Jonathan Oliver PA-C  [facility]

## 2018-11-08 ENCOUNTER — OFFICE VISIT (OUTPATIENT)
Dept: FAMILY MEDICINE | Facility: CLINIC | Age: 78
End: 2018-11-08
Payer: COMMERCIAL

## 2018-11-08 VITALS
OXYGEN SATURATION: 99 % | WEIGHT: 173 LBS | BODY MASS INDEX: 29.53 KG/M2 | HEART RATE: 71 BPM | SYSTOLIC BLOOD PRESSURE: 136 MMHG | HEIGHT: 64 IN | DIASTOLIC BLOOD PRESSURE: 78 MMHG | RESPIRATION RATE: 14 BRPM | TEMPERATURE: 97.6 F

## 2018-11-08 DIAGNOSIS — I10 HTN, GOAL BELOW 150/90: Primary | ICD-10-CM

## 2018-11-08 DIAGNOSIS — E78.5 HYPERLIPIDEMIA LDL GOAL <130: ICD-10-CM

## 2018-11-08 DIAGNOSIS — K21.9 GASTROESOPHAGEAL REFLUX DISEASE, ESOPHAGITIS PRESENCE NOT SPECIFIED: ICD-10-CM

## 2018-11-08 PROCEDURE — 99213 OFFICE O/P EST LOW 20 MIN: CPT | Performed by: PHYSICIAN ASSISTANT

## 2018-11-08 RX ORDER — LISINOPRIL 10 MG/1
10 TABLET ORAL DAILY
Qty: 90 TABLET | Refills: 1 | Status: SHIPPED | OUTPATIENT
Start: 2018-11-08 | End: 2019-05-05

## 2018-11-08 NOTE — MR AVS SNAPSHOT
"              After Visit Summary   11/8/2018    Markel Padilla    MRN: [id number]           Patient Information     Date Of Birth          1940        Visit Information        Provider Department      11/8/2018 8:20 AM Jonathan Oliver PA-C [facility]        Today's Diagnoses     HTN, goal below 150/90    -  1    Hyperlipidemia LDL goal <130        Gastroesophageal reflux disease, esophagitis presence not specified           Follow-ups after your visit        Follow-up notes from your care team     Return in about 6 months (around 5/8/2019) for BP Recheck.      Who to contact     If you have questions or need follow up information about today's clinic visit or your schedule please contact [facility] directly at [phone].  Normal or non-critical lab and imaging results will be communicated to you by MyChart, letter or phone within 4 business days after the clinic has received the results. If you do not hear from us within 7 days, please contact the clinic through MyChart or phone. If you have a critical or abnormal lab result, we will notify you by phone as soon as possible.  Submit refill requests through [employer] or call your pharmacy and they will forward the refill request to us. Please allow 3 business days for your refill to be completed.          Additional Information About Your Visit        Care EveryWhere ID     This is your Care EveryWhere ID. This could be used by other organizations to access your [place] medical records  [id number]        Your Vitals Were     Pulse Temperature Respirations Height Pulse Oximetry BMI (Body Mass Index)    71 97.6  F (36.4  C) (Oral) 14 5' 4\" (1.626 m) 99% 29.7 kg/m2       Blood Pressure from Last 3 Encounters:   11/08/18 136/78   04/18/18 138/70   08/23/17 148/78    Weight from Last 3 Encounters:   11/08/18 173 lb (78.5 kg)   04/18/18 175 lb 12.8 oz (79.7 kg)   08/23/17 163 lb (73.9 kg)              Today, you had the " following     No orders found for display         Where to get your medicines      These medications were sent to [facility] - [place], [place] - [address]  [address] [address]     Phone:  [phone]     lisinopril 10 MG tablet          Primary Care Provider Office Phone # Fax #    Jonathan Oliver PA-C [phone] [address] [address] [address]        Equal Access to Services     [facility]: Hadii aad ku hadasho Soomaali, waaxda luqadaha, qaybta kaalmada adeegyada, waxay eulalioin hayaan aderaisa tinajero . So [facility] [phone].    ATENCIÓN: Si habla español, tiene a pollack disposición servicios gratuitos de asistencia lingüística. [facility] [phone].    We comply with applicable federal civil rights laws and [place] laws. We do not discriminate on the basis of race, color, national origin, age, disability, sex, sexual orientation, or gender identity.            Thank you!     Thank you for choosing [facility]  for your care. Our goal is always to provide you with excellent care. Hearing back from our patients is one way we can continue to improve our services. Please take a few minutes to complete the written survey that you may receive in the mail after your visit with us. Thank you!             Your Updated Medication List - Protect others around you: Learn how to safely use, store and throw away your medicines at www.disposemymeds.org.          This list is accurate as of 11/8/18  8:52 AM.  Always use your most recent med list.                   Brand Name Dispense Instructions for use Diagnosis    aspirin 81 MG tablet      Take 1 tablet by mouth daily.        calcium carbonate 500 mg (elemental) 500 MG tablet    OS-FREDIS    180 tablet    Take 1 tablet (500 mg) by mouth 2 times daily        ferrous sulfate 325 (65 Fe) MG tablet    IRON     Take by mouth daily (with breakfast)        fish  oil-omega-3 fatty acids 1000 MG capsule      1 tablet daily        GLUCOSAMINE CHONDR 1500 COMPLX PO      1 tablet daily        lisinopril 10 MG tablet    PRINIVIL/ZESTRIL    90 tablet    Take 1 tablet (10 mg) by mouth daily    HTN, goal below 150/90       Multi-vitamin Tabs tablet   Generic drug:  multivitamin, therapeutic with minerals      1 TABLET DAILY        omeprazole 20 MG CR capsule    priLOSEC    90 capsule    Take 1 capsule (20 mg) by mouth daily    Gastroesophageal reflux disease, esophagitis presence not specified       simvastatin 40 MG tablet    ZOCOR    90 tablet    Take 1 tablet (40 mg) by mouth At Bedtime    Hyperlipidemia LDL goal <130       triamcinolone 0.5 % cream    KENALOG    30 g    Apply sparingly to affected area three times daily.    Eczema

## 2018-11-08 NOTE — NURSING NOTE
"Chief Complaint   Patient presents with     Hypertension     Lipids     Health Maintenance     ADP       Initial /78  Pulse 71  Temp 97.6  F (36.4  C) (Oral)  Resp 14  Ht 5' 4\" (1.626 m)  Wt 173 lb (78.5 kg)  SpO2 99%  BMI 29.7 kg/m2 Estimated body mass index is 29.7 kg/(m^2) as calculated from the following:    Height as of this encounter: 5' 4\" (1.626 m).    Weight as of this encounter: 173 lb (78.5 kg).  Medication Reconciliation: complete  Dixie lPascencia CMA    "

## 2019-03-05 ENCOUNTER — OFFICE VISIT (OUTPATIENT)
Dept: FAMILY MEDICINE | Facility: CLINIC | Age: 79
End: 2019-03-05
Payer: COMMERCIAL

## 2019-03-05 VITALS
WEIGHT: 176 LBS | HEART RATE: 86 BPM | DIASTOLIC BLOOD PRESSURE: 85 MMHG | OXYGEN SATURATION: 97 % | SYSTOLIC BLOOD PRESSURE: 184 MMHG | BODY MASS INDEX: 30.21 KG/M2

## 2019-03-05 DIAGNOSIS — L73.1 INGROWN HAIR: Primary | ICD-10-CM

## 2019-03-05 PROCEDURE — 99213 OFFICE O/P EST LOW 20 MIN: CPT | Performed by: PHYSICIAN ASSISTANT

## 2019-03-05 NOTE — PROGRESS NOTES
"  SUBJECTIVE:   Markel Padilla is a 78 year old male who presents to clinic today for the following health issues:      Patient c/o \"ingrown hair\" on right side of face X a couple days. Patient was shaving and felt pain in that area. Patients wife tried to pop it but caused too much pain.     No fever. No tooth pain        Allergies   Allergen Reactions     Nkda [No Known Drug Allergies]        Past Medical History:   Diagnosis Date     Ehrlichiosis      Hemorrhage gastric      Hypercholesteremia      Hypertension      MEDICAL HISTORY OF -     major forearm laceration         Current Outpatient Medications on File Prior to Visit:  aspirin 81 MG tablet Take 1 tablet by mouth daily.   calcium carbonate (OS-FREDIS 500 MG [place]. [place]) 500 MG tablet Take 1 tablet (500 mg) by mouth 2 times daily   ferrous sulfate 325 (65 FE) MG tablet Take by mouth daily (with breakfast)   FISH OIL 1000 MG OR CAPS 1 tablet daily   GLUCOSAMINE CHONDR 1500 COMPLX OR 1 tablet daily   lisinopril (PRINIVIL/ZESTRIL) 10 MG tablet Take 1 tablet (10 mg) by mouth daily   MULTI-VITAMIN OR TABS 1 TABLET DAILY   omeprazole (PRILOSEC) 20 MG CR capsule Take 1 capsule (20 mg) by mouth daily   simvastatin (ZOCOR) 40 MG tablet Take 1 tablet (40 mg) by mouth At Bedtime   triamcinolone (KENALOG) 0.5 % cream Apply sparingly to affected area three times daily.     No current facility-administered medications on file prior to visit.     Social History     Tobacco Use     Smoking status: Former Smoker     Types: Cigarettes     Last attempt to quit: 1979     Years since quittin.3     Smokeless tobacco: Current User     Types: Chew     Last attempt to quit: 1989   Substance Use Topics     Alcohol use: Yes     Comment: rare     Drug use: No       ROS:  General: negative for fever  SKIN: + as above  Psych- nl mentation    Physcial Exam:  /85   Pulse 86   Wt 79.8 kg (176 lb)   SpO2 97%   BMI 30.21 kg/m      GENERAL: alert, no acute " distress  EYES: conjunctival clear  RESP: Regular breathing rate  NEURO: awake .  SKIN: Above angle of mandible there is a tender superficial 1-2 mm black area. It is an ingrown hair. Cleansed with alcohol. TB needle used to get one edge free, then fine forceps was used to yank the hair out. No surrounding redness. Took 1-2 minutes total.  Neck- supple, no LA    ASSESSMENT:    ICD-10-CM    1. Ingrown hair L73.1        PLAN:Bacitracin x 3 days. RETURN TO CLINIC prn.    Marilia Ibrahim PA-C

## 2019-05-05 DIAGNOSIS — I10 HTN, GOAL BELOW 150/90: ICD-10-CM

## 2019-05-05 NOTE — LETTER
May 7, 2019    Markel Padilla  [address]  [place] [address]    Dear Markel,       We recently received a refill request for Lisinopril.  We have refilled this for a one time 30 day supply only because you are due for a:    Blood pressure, GERD office visit and fasting lab appointment      Please schedule this lab appointment 4-5 days prior to the office visit.     Please call at your earliest convenience so that there will not be a delay with your future refills.          Thank you,   Your [facility] Team/solomon  [phone]

## 2019-05-06 DIAGNOSIS — I10 HTN, GOAL BELOW 150/90: ICD-10-CM

## 2019-05-06 RX ORDER — LISINOPRIL 10 MG/1
TABLET ORAL
Qty: 30 TABLET | Refills: 0 | Status: SHIPPED | OUTPATIENT
Start: 2019-05-06 | End: 2019-05-06

## 2019-05-06 NOTE — LETTER
May 8, 2019    Markel Padilla  [address] [place] [address]    Dear Markel,       We recently received a refill request for lisinopril (PRINIVIL/ZESTRIL) 10 MG tablet.  We have refilled this for a one time 90 day supply only because you are due for a:    Hypertension office visit      Please call at your earliest convenience so that there will not be a delay with your future refills.          Thank you,   Your [facility] Team/gi  [phone]

## 2019-05-06 NOTE — TELEPHONE ENCOUNTER
Prescription approved per [facility] Refill Protocol #30  APPT NEEDED FOR FURTHER REFILLS  Please help the pt schedule an appointment hypertension lipids, GERD    Health Maintenance Due   Topic Date Due     ADVANCE DIRECTIVE PLANNING Q5 YRS  06/07/2016     MEDICARE ANNUAL WELLNESS VISIT  01/27/2017     PNEUMOCOCCAL IMMUNIZATION 65+ LOW/MEDIUM RISK (2 of 2 - PPSV23) 01/10/2018     PHQ-2  01/01/2019     FALL RISK ASSESSMENT  04/18/2019     Katelynn Elaine RN

## 2019-05-08 RX ORDER — LISINOPRIL 10 MG/1
TABLET ORAL
Qty: 90 TABLET | Refills: 0 | Status: SHIPPED | OUTPATIENT
Start: 2019-05-08 | End: 2019-05-22

## 2019-05-08 NOTE — TELEPHONE ENCOUNTER
To provider, request is for 90 day supply. 30 day was already sent as patient needs follow up   Per protocol RN cannot approve 90 day supply    Ai MUNOZN, RN, CPN

## 2019-05-11 ENCOUNTER — DOCUMENTATION ONLY (OUTPATIENT)
Dept: LAB | Facility: CLINIC | Age: 79
End: 2019-05-11

## 2019-05-11 DIAGNOSIS — I10 HTN, GOAL BELOW 150/90: Primary | ICD-10-CM

## 2019-05-11 NOTE — PROGRESS NOTES
Patient has an up coming lab appointment on 5.16.2019. Please review and place future orders that may be needed.     Thank you  Shandra Mejia MLT ([facility])

## 2019-05-13 NOTE — PROGRESS NOTES
Need previsit labs-see orders and close encounter if nothing else needed./Roxi Raman,       BP/cholesterol 5/22/19

## 2019-05-16 DIAGNOSIS — E78.5 HYPERLIPIDEMIA LDL GOAL <130: ICD-10-CM

## 2019-05-16 DIAGNOSIS — I10 HTN, GOAL BELOW 150/90: ICD-10-CM

## 2019-05-16 LAB
ANION GAP SERPL CALCULATED.3IONS-SCNC: 6 MMOL/L (ref 3–14)
BUN SERPL-MCNC: 7 MG/DL (ref 7–30)
CALCIUM SERPL-MCNC: 9.4 MG/DL (ref 8.5–10.1)
CHLORIDE SERPL-SCNC: 103 MMOL/L (ref 94–109)
CO2 SERPL-SCNC: 28 MMOL/L (ref 20–32)
CREAT SERPL-MCNC: 0.82 MG/DL (ref 0.66–1.25)
GFR SERPL CREATININE-BSD FRML MDRD: 84 ML/MIN/{1.73_M2}
GLUCOSE SERPL-MCNC: 93 MG/DL (ref 70–99)
POTASSIUM SERPL-SCNC: 4.5 MMOL/L (ref 3.4–5.3)
SODIUM SERPL-SCNC: 137 MMOL/L (ref 133–144)

## 2019-05-16 PROCEDURE — 80048 BASIC METABOLIC PNL TOTAL CA: CPT | Performed by: PHYSICIAN ASSISTANT

## 2019-05-16 PROCEDURE — 36415 COLL VENOUS BLD VENIPUNCTURE: CPT | Performed by: PHYSICIAN ASSISTANT

## 2019-05-16 RX ORDER — SIMVASTATIN 40 MG
TABLET ORAL
Qty: 90 TABLET | Refills: 1 | Status: SHIPPED | OUTPATIENT
Start: 2019-05-16 | End: 2019-05-22

## 2019-05-16 NOTE — LETTER
May 16, 2019    Markel Padilla  [address]  [place] [address]        Mr. Padilla,    All of your labs were normal for you.    Please contact the clinic if you have additional questions.  Thank you.    Sincerely,    Jonathan Oliver PA-C     Results for orders placed or performed in visit on 05/16/19   Basic metabolic panel   Result Value Ref Range    Sodium 137 133 - 144 mmol/L    Potassium 4.5 3.4 - 5.3 mmol/L    Chloride 103 94 - 109 mmol/L    Carbon Dioxide 28 20 - 32 mmol/L    Anion Gap 6 3 - 14 mmol/L    Glucose 93 70 - 99 mg/dL    Urea Nitrogen 7 7 - 30 mg/dL    Creatinine 0.82 0.66 - 1.25 mg/dL    GFR Estimate 84 >60 mL/min/[1.73_m2]    GFR Estimate If Black >90 >60 mL/min/[1.73_m2]    Calcium 9.4 8.5 - 10.1 mg/dL

## 2019-05-21 NOTE — PROGRESS NOTES
SUBJECTIVE:                                                    Markel Padilla is a 78 year old male who presents to clinic today for the following health issues:    Hyperlipidemia Follow-Up        Are you regularly taking any medication or supplement to lower your cholesterol?   Yes    Are you having muscle aches or other side effects that you think could be caused by your cholesterol lowering medication?  No      Hypertension Follow-up      Do you check your blood pressure regularly outside of the clinic? Yes     Are you following a low salt diet? Yes - less salt    Are your blood pressures ever more than 140 on the top number (systolic) OR more   than 90 on the bottom number (diastolic), for example 140/90? Yes      Problem list and histories reviewed & adjusted, as indicated.  Additional history: as documented    Patient Active Problem List   Diagnosis     GERD (gastroesophageal reflux disease)     Erectile dysfunction     Hypertension goal BP (blood pressure) < 140/90     Hyperlipidemia LDL goal <130     Overweight (BMI 25.0-29.9)     Advanced directives, counseling/discussion     Hearing loss     Sleep disturbance     Eczema     Gastroesophageal reflux disease, esophagitis presence not specified     Hearing loss, bilateral     Raynaud's disease without gangrene     Past Surgical History:   Procedure Laterality Date     NO HISTORY OF SURGERY         Social History     Tobacco Use     Smoking status: Former Smoker     Types: Cigarettes     Last attempt to quit: 1979     Years since quittin.5     Smokeless tobacco: Current User     Types: Chew     Last attempt to quit: 1989   Substance Use Topics     Alcohol use: Yes     Comment: rare     Family History   Problem Relation Age of Onset     Heart Disease Mother      Lipids Mother      C.A.D. Father         age 65     Respiratory Sister         COPD     Hypertension Son      Hypertension Son          Current Outpatient Medications   Medication Sig  "Dispense Refill     aspirin 81 MG tablet Take 1 tablet by mouth daily.       calcium carbonate (OS-FREDIS 500 MG [place]. [place]) 500 MG tablet Take 1 tablet (500 mg) by mouth 2 times daily 180 tablet 3     ferrous sulfate 325 (65 FE) MG tablet Take by mouth daily (with breakfast)       FISH OIL 1000 MG OR CAPS 1 tablet daily       GLUCOSAMINE CHONDR 1500 COMPLX OR 1 tablet daily       lisinopril (PRINIVIL/ZESTRIL) 10 MG tablet Take 1 tablet (10 mg) by mouth daily 90 tablet 1     MULTI-VITAMIN OR TABS 1 TABLET DAILY       omeprazole (PRILOSEC) 20 MG DR capsule Take 1 capsule (20 mg) by mouth daily 90 capsule 3     simvastatin (ZOCOR) 40 MG tablet Take 1 tablet (40 mg) by mouth At Bedtime 90 tablet 1     triamcinolone (KENALOG) 0.5 % cream Apply sparingly to affected area three times daily. 30 g 0     Allergies   Allergen Reactions     Nkda [No Known Drug Allergies]      BP Readings from Last 3 Encounters:   05/22/19 138/70   03/05/19 184/85   11/08/18 136/78    Wt Readings from Last 3 Encounters:   05/22/19 77.1 kg (170 lb)   03/05/19 79.8 kg (176 lb)   11/08/18 78.5 kg (173 lb)           Assessment & Plan       ICD-10-CM    1. Hypertension goal BP (blood pressure) < 140/90 I10 lisinopril (PRINIVIL/ZESTRIL) 10 MG tablet   2. Gastroesophageal reflux disease, esophagitis presence not specified K21.9 omeprazole (PRILOSEC) 20 MG DR capsule   3. Hyperlipidemia LDL goal <130 E78.5 simvastatin (ZOCOR) 40 MG tablet   meds refilled.      BMI:   Estimated body mass index is 29.18 kg/m  as calculated from the following:    Height as of this encounter: 1.626 m (5' 4\").    Weight as of this encounter: 77.1 kg (170 lb).     MEDICATIONS:  Continue current medications without change    Return in about 6 months (around 11/22/2019) for BP Recheck.    Jonathan Oliver PA-C  [facility]    "

## 2019-05-22 ENCOUNTER — OFFICE VISIT (OUTPATIENT)
Dept: FAMILY MEDICINE | Facility: CLINIC | Age: 79
End: 2019-05-22
Payer: COMMERCIAL

## 2019-05-22 VITALS
WEIGHT: 170 LBS | SYSTOLIC BLOOD PRESSURE: 138 MMHG | OXYGEN SATURATION: 99 % | DIASTOLIC BLOOD PRESSURE: 70 MMHG | HEART RATE: 69 BPM | HEIGHT: 64 IN | BODY MASS INDEX: 29.02 KG/M2 | RESPIRATION RATE: 16 BRPM

## 2019-05-22 DIAGNOSIS — E78.5 HYPERLIPIDEMIA LDL GOAL <130: ICD-10-CM

## 2019-05-22 DIAGNOSIS — I10 HYPERTENSION GOAL BP (BLOOD PRESSURE) < 140/90: ICD-10-CM

## 2019-05-22 DIAGNOSIS — K21.9 GASTROESOPHAGEAL REFLUX DISEASE, ESOPHAGITIS PRESENCE NOT SPECIFIED: ICD-10-CM

## 2019-05-22 PROCEDURE — 99213 OFFICE O/P EST LOW 20 MIN: CPT | Performed by: PHYSICIAN ASSISTANT

## 2019-05-22 RX ORDER — LISINOPRIL 10 MG/1
10 TABLET ORAL DAILY
Qty: 90 TABLET | Refills: 1 | Status: SHIPPED | OUTPATIENT
Start: 2019-05-22 | End: 2019-10-16

## 2019-05-22 RX ORDER — SIMVASTATIN 40 MG
40 TABLET ORAL AT BEDTIME
Qty: 90 TABLET | Refills: 1 | Status: SHIPPED | OUTPATIENT
Start: 2019-05-22 | End: 2020-02-25

## 2019-05-22 ASSESSMENT — MIFFLIN-ST. JEOR: SCORE: 1402.11

## 2019-06-18 ENCOUNTER — OFFICE VISIT (OUTPATIENT)
Dept: FAMILY MEDICINE | Facility: CLINIC | Age: 79
End: 2019-06-18
Payer: COMMERCIAL

## 2019-06-18 VITALS
TEMPERATURE: 98 F | HEART RATE: 68 BPM | HEIGHT: 64 IN | RESPIRATION RATE: 14 BRPM | OXYGEN SATURATION: 99 % | BODY MASS INDEX: 29.19 KG/M2 | WEIGHT: 171 LBS | DIASTOLIC BLOOD PRESSURE: 76 MMHG | SYSTOLIC BLOOD PRESSURE: 135 MMHG

## 2019-06-18 DIAGNOSIS — L98.9 SKIN LESION: Primary | ICD-10-CM

## 2019-06-18 PROCEDURE — 99213 OFFICE O/P EST LOW 20 MIN: CPT | Performed by: PHYSICIAN ASSISTANT

## 2019-06-18 ASSESSMENT — MIFFLIN-ST. JEOR: SCORE: 1406.65

## 2019-06-18 ASSESSMENT — PAIN SCALES - GENERAL: PAINLEVEL: NO PAIN (0)

## 2019-06-18 NOTE — PROGRESS NOTES
Subjective     Markel Padilla is a 78 year old male who presents to clinic today for the following health issues:    HPI   Lump      Duration: several month    Description (location/character/radiation): right side of face    Intensity:  mild    Accompanying signs and symptoms: gets cut with shaving    History (similar episodes/previous evaluation): was seen and was told was from an ingrown hair    Precipitating or alleviating factors: None    Therapies tried and outcome: none   His main concern is that it bothers him with shaving.     Patient Active Problem List   Diagnosis     Erectile dysfunction     Hypertension goal BP (blood pressure) < 140/90     Hyperlipidemia LDL goal <130     Overweight (BMI 25.0-29.9)     Advanced directives, counseling/discussion     Hearing loss     Sleep disturbance     Eczema     Gastroesophageal reflux disease, esophagitis presence not specified     Hearing loss, bilateral     Raynaud's disease without gangrene     Past Surgical History:   Procedure Laterality Date     NO HISTORY OF SURGERY         Social History     Tobacco Use     Smoking status: Former Smoker     Types: Cigarettes     Last attempt to quit: 1979     Years since quittin.6     Smokeless tobacco: Current User     Types: Chew     Last attempt to quit: 1989   Substance Use Topics     Alcohol use: Yes     Comment: rare     Family History   Problem Relation Age of Onset     Heart Disease Mother      Lipids Mother      C.A.D. Father         age 65     Respiratory Sister         COPD     Hypertension Son      Hypertension Son          Current Outpatient Medications   Medication Sig Dispense Refill     aspirin 81 MG tablet Take 1 tablet by mouth daily.       calcium carbonate (OS-FREDIS 500 MG [place]. [place]) 500 MG tablet Take 1 tablet (500 mg) by mouth 2 times daily 180 tablet 3     ferrous sulfate 325 (65 FE) MG tablet Take by mouth daily (with breakfast)       FISH OIL 1000 MG OR CAPS 1 tablet daily        "GLUCOSAMINE CHONDR 1500 COMPLX OR 1 tablet daily       lisinopril (PRINIVIL/ZESTRIL) 10 MG tablet Take 1 tablet (10 mg) by mouth daily 90 tablet 1     MULTI-VITAMIN OR TABS 1 TABLET DAILY       omeprazole (PRILOSEC) 20 MG DR capsule Take 1 capsule (20 mg) by mouth daily 90 capsule 3     simvastatin (ZOCOR) 40 MG tablet Take 1 tablet (40 mg) by mouth At Bedtime 90 tablet 1     triamcinolone (KENALOG) 0.5 % cream Apply sparingly to affected area three times daily. 30 g 0     Allergies   Allergen Reactions     Nkda [No Known Drug Allergies]      BP Readings from Last 3 Encounters:   06/18/19 135/76   05/22/19 138/70   03/05/19 184/85    Wt Readings from Last 3 Encounters:   06/18/19 77.6 kg (171 lb)   05/22/19 77.1 kg (170 lb)   03/05/19 79.8 kg (176 lb)                    Reviewed and updated as needed this visit by Provider  Tobacco  Allergies  Meds  Problems  Med Hx  Surg Hx  Fam Hx         Review of Systems   ROS COMP: Constitutional, HEENT, cardiovascular, pulmonary, gi and gu systems are negative, except as otherwise noted.      Objective    /76   Pulse 68   Temp 98  F (36.7  C) (Oral)   Resp 14   Ht 1.626 m (5' 4\")   Wt 77.6 kg (171 lb)   SpO2 99%   BMI 29.35 kg/m    Body mass index is 29.35 kg/m .  Physical Exam   GENERAL: healthy, alert and no distress  SKIN: about 5 mm firm raised lesion on right cheek.    Diagnostic Test Results:  none         Assessment & Plan       ICD-10-CM    1. Skin lesion L98.9 OTOLARYNGOLOGY REFERRAL   appears to be a dermatofibroma.   Follow up  With ENT for 2nd opinion.   Patient reassuarance    Return in about 2 weeks (around 7/2/2019) for recheck.    Jonathan Oliver PA-C  [facility]      "

## 2019-06-27 NOTE — PROGRESS NOTES
Chief Complaint - skin lesion    History of Present Illness - Markel Padilla is a 78 year old male presents with a lesion on the right cheek. The patient has noticed for approximately a few months. It hasn't been changing in size and/or contour. It can be painful. + bleeding or scabbing when he cuts it with a shaver. The patient hasn't had anything like this before. some sun exposure. No history of skin cancer. No family history of skin cancer. Nonsmoker. No lumps or swollen glands in the neck.     Past Medical History -   Patient Active Problem List   Diagnosis     Erectile dysfunction     Hypertension goal BP (blood pressure) < 140/90     Hyperlipidemia LDL goal <130     Overweight (BMI 25.0-29.9)     Advanced directives, counseling/discussion     Hearing loss     Sleep disturbance     Eczema     Gastroesophageal reflux disease, esophagitis presence not specified     Hearing loss, bilateral     Raynaud's disease without gangrene       Current Medications -   Current Outpatient Medications:      aspirin 81 MG tablet, Take 1 tablet by mouth daily., Disp: , Rfl:      calcium carbonate (OS-FREDIS 500 MG [place]. [place]) 500 MG tablet, Take 1 tablet (500 mg) by mouth 2 times daily, Disp: 180 tablet, Rfl: 3     ferrous sulfate 325 (65 FE) MG tablet, Take by mouth daily (with breakfast), Disp: , Rfl:      FISH OIL 1000 MG OR CAPS, 1 tablet daily, Disp: , Rfl:      GLUCOSAMINE CHONDR 1500 COMPLX OR, 1 tablet daily, Disp: , Rfl:      lisinopril (PRINIVIL/ZESTRIL) 10 MG tablet, Take 1 tablet (10 mg) by mouth daily, Disp: 90 tablet, Rfl: 1     MULTI-VITAMIN OR TABS, 1 TABLET DAILY, Disp: , Rfl:      omeprazole (PRILOSEC) 20 MG DR capsule, Take 1 capsule (20 mg) by mouth daily, Disp: 90 capsule, Rfl: 3     simvastatin (ZOCOR) 40 MG tablet, Take 1 tablet (40 mg) by mouth At Bedtime, Disp: 90 tablet, Rfl: 1     triamcinolone (KENALOG) 0.5 % cream, Apply sparingly to affected area three times daily., Disp: 30 g, Rfl: 0    Allergies -    Allergies   Allergen Reactions     Nkda [No Known Drug Allergies]        Social History -   Social History     Socioeconomic History     Marital status:      Spouse name: Not on file     Number of children: Not on file     Years of education: Not on file     Highest education level: Not on file   Occupational History     Not on file   Social Needs     Financial resource strain: Not on file     Food insecurity:     Worry: Not on file     Inability: Not on file     Transportation needs:     Medical: Not on file     Non-medical: Not on file   Tobacco Use     Smoking status: Former Smoker     Types: Cigarettes     Last attempt to quit: 1979     Years since quittin.6     Smokeless tobacco: Current User     Types: Chew     Last attempt to quit: 1989   Substance and Sexual Activity     Alcohol use: Yes     Comment: rare     Drug use: No     Sexual activity: Yes     Partners: Female   Lifestyle     Physical activity:     Days per week: Not on file     Minutes per session: Not on file     Stress: Not on file   Relationships     Social connections:     Talks on phone: Not on file     Gets together: Not on file     Attends Restorationism service: Not on file     Active member of club or organization: Not on file     Attends meetings of clubs or organizations: Not on file     Relationship status: Not on file     Intimate partner violence:     Fear of current or ex partner: Not on file     Emotionally abused: Not on file     Physically abused: Not on file     Forced sexual activity: Not on file   Other Topics Concern     Parent/sibling w/ CABG, MI or angioplasty before 65F 55M? Not Asked   Social History Narrative     Not on file       Family History -   Family History   Problem Relation Age of Onset     Heart Disease Mother      Lipids Mother      C.A.D. Father         age 65     Respiratory Sister         COPD     Hypertension Son      Hypertension Son        Review of Systems - As per HPI and PMHx, otherwise  "7 system review of the head and neck negative.    Physical Exam  /62   Ht 1.626 m (5' 4\")   Wt 77.6 kg (171 lb)   BMI 29.35 kg/m    General - The patient is in no distress.  Alert and oriented x3, answers questions and cooperates with examination appropriately.   Voice and Breathing - The patient was breathing comfortably without the use of accessory muscles. There was no wheezing, stridor, or stertor.  The patients voice was clear and strong.  Skin - examination of the right cheek lesion reveals a 4 mm, raised, well-circumscribed lesion. No ulceration or bleeding. No other lesions noted.  Eyes - Extraocular movements intact. Sclera were not icteric or injected, conjunctiva were pink and moist.  Neurologic - Cranial nerves II-XII are grossly intact. Specifically, the facial nerve is intact, House-Brackmann grade 1 of 6. Facial sensation is intact and symmetric.  Neck -  Palpation of the occipital, submental, submandibular, internal jugular chain, and supraclavicular nodes did not demonstrate any abnormal lymph nodes or masses. The parotid glands were without masses. Palpation of the thyroid was soft and smooth, with no nodules or goiter appreciated.  The trachea was midline.    Procedure:    I explained the risk, benefits, and alteratives to skin biopsy. The patient agreed and wished to proceed. I injected the lesion with 1% lidocaine, 1:100,000 epinephrine. I used a 4 mm punch biopsy and then transected the biopsy at the base with a scissors. I placed the specimen in formalin. I closed this with a simple interrupted 5-0 fast suture.    A/P - Markel Padilla is a 78 year old male with a lesion on the right cheek. It is likely a sebaceous cyst, but I want to rule-out skin cancer. I biopsied this today in clinic and will call the patient with the results. This may require a larger resection based on pathology. They should put antibiotic ointment on the lesion 2-3 times a day until the lesion heals.   "       Darci Ngo MD  Otolaryngology  [facility]

## 2019-06-28 ENCOUNTER — OFFICE VISIT (OUTPATIENT)
Dept: OTOLARYNGOLOGY | Facility: CLINIC | Age: 79
End: 2019-06-28
Payer: COMMERCIAL

## 2019-06-28 VITALS
WEIGHT: 171 LBS | DIASTOLIC BLOOD PRESSURE: 62 MMHG | BODY MASS INDEX: 29.19 KG/M2 | HEIGHT: 64 IN | SYSTOLIC BLOOD PRESSURE: 142 MMHG

## 2019-06-28 DIAGNOSIS — L98.9 SKIN LESION: Primary | ICD-10-CM

## 2019-06-28 PROCEDURE — 11104 PUNCH BX SKIN SINGLE LESION: CPT | Performed by: OTOLARYNGOLOGY

## 2019-06-28 PROCEDURE — 88305 TISSUE EXAM BY PATHOLOGIST: CPT | Performed by: OTOLARYNGOLOGY

## 2019-06-28 PROCEDURE — 99203 OFFICE O/P NEW LOW 30 MIN: CPT | Mod: 25 | Performed by: OTOLARYNGOLOGY

## 2019-06-28 ASSESSMENT — MIFFLIN-ST. JEOR: SCORE: 1406.65

## 2019-06-28 NOTE — PATIENT INSTRUCTIONS
General Scheduling Information  To schedule your CT/MRI scan, please contact [place] [facility] at [phone]   [address] [place] [address]    To schedule your Surgery, please contact our Specialty Schedulers at [phone]    ENT Clinic Locations Clinic Hours Telephone Number     [facility]  [address] [place] [address]   Tuesday:       8:00am -- 4:00pm    Wednesday:  8:00am - 4:00pm   To schedule an appointment with   Dr. Ngo,   please contact our   Specialty Scheduling Department at:     [phone]       [facility]  [address] [place] [address]   Friday:          8:00am - 4:00pm         Urgent Care Locations Clinic Hours Telephone Numbers     [facility]  [address] [place] [address]     Monday-Friday:     11:00pm - 9:00pm    Saturday-Sunday:  9:00am - 5:00pm   [phone]     [facility]  [address] [place] [address]     Monday-Friday:      5:00pm - 9:00pm     Saturday-Sunday:  9:00am - 5:00pm   [phone]

## 2019-06-28 NOTE — LETTER
6/28/2019         RE: Markel Padilla  [address] [place] [address]        Dear Colleague,    Thank you for referring your patient, Markel Padilla, to the [facility]. Please see a copy of my visit note below.    Chief Complaint - skin lesion    History of Present Illness - Markel Padilla is a 78 year old male presents with a lesion on the right cheek. The patient has noticed for approximately a few months. It hasn't been changing in size and/or contour. It can be painful. + bleeding or scabbing when he cuts it with a shaver. The patient hasn't had anything like this before. some sun exposure. No history of skin cancer. No family history of skin cancer. Nonsmoker. No lumps or swollen glands in the neck.     Past Medical History -   Patient Active Problem List   Diagnosis     Erectile dysfunction     Hypertension goal BP (blood pressure) < 140/90     Hyperlipidemia LDL goal <130     Overweight (BMI 25.0-29.9)     Advanced directives, counseling/discussion     Hearing loss     Sleep disturbance     Eczema     Gastroesophageal reflux disease, esophagitis presence not specified     Hearing loss, bilateral     Raynaud's disease without gangrene       Current Medications -   Current Outpatient Medications:      aspirin 81 MG tablet, Take 1 tablet by mouth daily., Disp: , Rfl:      calcium carbonate (OS-FREDIS 500 MG [place]. [place]) 500 MG tablet, Take 1 tablet (500 mg) by mouth 2 times daily, Disp: 180 tablet, Rfl: 3     ferrous sulfate 325 (65 FE) MG tablet, Take by mouth daily (with breakfast), Disp: , Rfl:      FISH OIL 1000 MG OR CAPS, 1 tablet daily, Disp: , Rfl:      GLUCOSAMINE CHONDR 1500 COMPLX OR, 1 tablet daily, Disp: , Rfl:      lisinopril (PRINIVIL/ZESTRIL) 10 MG tablet, Take 1 tablet (10 mg) by mouth daily, Disp: 90 tablet, Rfl: 1     MULTI-VITAMIN OR TABS, 1 TABLET DAILY, Disp: , Rfl:      omeprazole (PRILOSEC) 20 MG DR capsule, Take 1 capsule (20 mg) by mouth daily, Disp: 90  capsule, Rfl: 3     simvastatin (ZOCOR) 40 MG tablet, Take 1 tablet (40 mg) by mouth At Bedtime, Disp: 90 tablet, Rfl: 1     triamcinolone (KENALOG) 0.5 % cream, Apply sparingly to affected area three times daily., Disp: 30 g, Rfl: 0    Allergies -   Allergies   Allergen Reactions     Nkda [No Known Drug Allergies]        Social History -   Social History     Socioeconomic History     Marital status:      Spouse name: Not on file     Number of children: Not on file     Years of education: Not on file     Highest education level: Not on file   Occupational History     Not on file   Social Needs     Financial resource strain: Not on file     Food insecurity:     Worry: Not on file     Inability: Not on file     Transportation needs:     Medical: Not on file     Non-medical: Not on file   Tobacco Use     Smoking status: Former Smoker     Types: Cigarettes     Last attempt to quit: 1979     Years since quittin.6     Smokeless tobacco: Current User     Types: Chew     Last attempt to quit: 1989   Substance and Sexual Activity     Alcohol use: Yes     Comment: rare     Drug use: No     Sexual activity: Yes     Partners: Female   Lifestyle     Physical activity:     Days per week: Not on file     Minutes per session: Not on file     Stress: Not on file   Relationships     Social connections:     Talks on phone: Not on file     Gets together: Not on file     Attends Sabianist service: Not on file     Active member of club or organization: Not on file     Attends meetings of clubs or organizations: Not on file     Relationship status: Not on file     Intimate partner violence:     Fear of current or ex partner: Not on file     Emotionally abused: Not on file     Physically abused: Not on file     Forced sexual activity: Not on file   Other Topics Concern     Parent/sibling w/ CABG, MI or angioplasty before 65F 55M? Not Asked   Social History Narrative     Not on file       Family History -   Family  "History   Problem Relation Age of Onset     Heart Disease Mother      Lipids Mother      C.A.D. Father         age 65     Respiratory Sister         COPD     Hypertension Son      Hypertension Son        Review of Systems - As per HPI and PMHx, otherwise 7 system review of the head and neck negative.    Physical Exam  /62   Ht 1.626 m (5' 4\")   Wt 77.6 kg (171 lb)   BMI 29.35 kg/m     General - The patient is in no distress.  Alert and oriented x3, answers questions and cooperates with examination appropriately.   Voice and Breathing - The patient was breathing comfortably without the use of accessory muscles. There was no wheezing, stridor, or stertor.  The patients voice was clear and strong.  Skin - examination of the right cheek lesion reveals a 4 mm, raised, well-circumscribed lesion. No ulceration or bleeding. No other lesions noted.  Eyes - Extraocular movements intact. Sclera were not icteric or injected, conjunctiva were pink and moist.  Neurologic - Cranial nerves II-XII are grossly intact. Specifically, the facial nerve is intact, House-Brackmann grade 1 of 6. Facial sensation is intact and symmetric.  Neck -  Palpation of the occipital, submental, submandibular, internal jugular chain, and supraclavicular nodes did not demonstrate any abnormal lymph nodes or masses. The parotid glands were without masses. Palpation of the thyroid was soft and smooth, with no nodules or goiter appreciated.  The trachea was midline.    Procedure:    I explained the risk, benefits, and alteratives to skin biopsy. The patient agreed and wished to proceed. I injected the lesion with 1% lidocaine, 1:100,000 epinephrine. I used a 4 mm punch biopsy and then transected the biopsy at the base with a scissors. I placed the specimen in formalin. I closed this with a simple interrupted 5-0 fast suture.    A/P - Markel Padilla is a 78 year old male with a lesion on the right cheek. It is likely a sebaceous cyst, but I " want to rule-out skin cancer. I biopsied this today in clinic and will call the patient with the results. This may require a larger resection based on pathology. They should put antibiotic ointment on the lesion 2-3 times a day until the lesion heals.         Darci Ngo MD  Otolaryngology  [facility]      Again, thank you for allowing me to participate in the care of your patient.        Sincerely,        Darci Ngo MD

## 2019-07-03 LAB — COPATH REPORT: NORMAL

## 2019-09-30 DIAGNOSIS — I10 HYPERTENSION GOAL BP (BLOOD PRESSURE) < 140/90: ICD-10-CM

## 2019-09-30 RX ORDER — LISINOPRIL 10 MG/1
TABLET ORAL
Qty: 90 TABLET | Refills: 0 | OUTPATIENT
Start: 2019-09-30

## 2019-10-01 ENCOUNTER — OFFICE VISIT (OUTPATIENT)
Dept: FAMILY MEDICINE | Facility: CLINIC | Age: 79
End: 2019-10-01
Payer: COMMERCIAL

## 2019-10-01 VITALS
DIASTOLIC BLOOD PRESSURE: 80 MMHG | WEIGHT: 165 LBS | TEMPERATURE: 99 F | SYSTOLIC BLOOD PRESSURE: 138 MMHG | BODY MASS INDEX: 28.32 KG/M2 | RESPIRATION RATE: 20 BRPM | HEART RATE: 54 BPM

## 2019-10-01 DIAGNOSIS — M54.41 RIGHT-SIDED LOW BACK PAIN WITH RIGHT-SIDED SCIATICA, UNSPECIFIED CHRONICITY: Primary | ICD-10-CM

## 2019-10-01 PROCEDURE — 99213 OFFICE O/P EST LOW 20 MIN: CPT | Performed by: PHYSICIAN ASSISTANT

## 2019-10-01 NOTE — NURSING NOTE
"Chief Complaint   Patient presents with     Back Pain       Initial BP (!) 150/80   Pulse 54   Temp 99  F (37.2  C) (Oral)   Resp 20   Wt 74.8 kg (165 lb)   BMI 28.32 kg/m   Estimated body mass index is 28.32 kg/m  as calculated from the following:    Height as of 6/28/19: 1.626 m (5' 4\").    Weight as of this encounter: 74.8 kg (165 lb).    Naina Martinez CMA      " Yes

## 2019-10-01 NOTE — PROGRESS NOTES
Subjective     Markel Padilla is a 79 year old male who presents to clinic today for the following health issues:    HPI   Back Pain       Duration: 2 days        Specific cause: turning/bending    Description:   Location of pain: low back right, hip right and down right leg  Character of pain: sharp and intermittent  Pain radiation:radiates into the right buttocks and radiates into the right leg  New numbness or weakness in legs, not attributed to pain:  no     Intensity: Currently 8/10    History:   Pain interferes with job: YES  History of back problems: no prior back problems  Any previous MRI or X-rays: None  Sees a specialist for back pain:  No  Therapies tried without relief: cold and heat    Alleviating factors:   Improved by: none      Precipitating factors:  Worsened by: Standing and Walking    States he has been working underneath his truck and had noticed some pain after doing that up.  No immediate injury.  States the pain is in the right lower back and radiates into his right leg at times it.  Otherwise is mostly achiness in his right lower back.  He has been using icy hot and hot and cold packs that give him temporary relief.    Patient Active Problem List   Diagnosis     Erectile dysfunction     Hypertension goal BP (blood pressure) < 140/90     Hyperlipidemia LDL goal <130     Overweight (BMI 25.0-29.9)     Advanced directives, counseling/discussion     Hearing loss     Sleep disturbance     Eczema     Gastroesophageal reflux disease, esophagitis presence not specified     Hearing loss, bilateral     Raynaud's disease without gangrene     Past Surgical History:   Procedure Laterality Date     NO HISTORY OF SURGERY         Social History     Tobacco Use     Smoking status: Former Smoker     Types: Cigarettes     Last attempt to quit: 1979     Years since quittin.9     Smokeless tobacco: Current User     Types: Chew     Last attempt to quit: 1989   Substance Use Topics     Alcohol  use: Yes     Comment: rare     Family History   Problem Relation Age of Onset     Heart Disease Mother      Lipids Mother      C.A.D. Father         age 65     Respiratory Sister         COPD     Hypertension Son      Hypertension Son          Current Outpatient Medications   Medication Sig Dispense Refill     aspirin 81 MG tablet Take 1 tablet by mouth daily.       calcium carbonate (OS-FREDIS 500 MG [place]. [place]) 500 MG tablet Take 1 tablet (500 mg) by mouth 2 times daily 180 tablet 3     ferrous sulfate 325 (65 FE) MG tablet Take by mouth daily (with breakfast)       FISH OIL 1000 MG OR CAPS 1 tablet daily       GLUCOSAMINE CHONDR 1500 COMPLX OR 1 tablet daily       lisinopril (PRINIVIL/ZESTRIL) 10 MG tablet Take 1 tablet (10 mg) by mouth daily 90 tablet 1     MULTI-VITAMIN OR TABS 1 TABLET DAILY       omeprazole (PRILOSEC) 20 MG DR capsule Take 1 capsule (20 mg) by mouth daily 90 capsule 3     simvastatin (ZOCOR) 40 MG tablet Take 1 tablet (40 mg) by mouth At Bedtime 90 tablet 1     triamcinolone (KENALOG) 0.5 % cream Apply sparingly to affected area three times daily. 30 g 0     Allergies   Allergen Reactions     Nkda [No Known Drug Allergies]      BP Readings from Last 3 Encounters:   10/01/19 138/80   06/28/19 142/62   06/18/19 135/76    Wt Readings from Last 3 Encounters:   10/01/19 74.8 kg (165 lb)   06/28/19 77.6 kg (171 lb)   06/18/19 77.6 kg (171 lb)         Reviewed and updated as needed this visit by Provider  Tobacco  Allergies  Meds  Problems  Med Hx  Surg Hx  Fam Hx       Review of Systems   ROS COMP: Constitutional, HEENT, cardiovascular, pulmonary, gi and gu systems are negative, except as otherwise noted.      Objective    /80   Pulse 54   Temp 99  F (37.2  C) (Oral)   Resp 20   Wt 74.8 kg (165 lb)   BMI 28.32 kg/m    Body mass index is 28.32 kg/m .  Physical Exam   GENERAL: healthy, alert and no distress  RESP: lungs clear to auscultation - no rales, rhonchi or wheezes  CV: regular  rate and rhythm, normal S1 S2, no S3 or S4, no murmur, click or rub, no peripheral edema and peripheral pulses strong  Back: Generalized thoracic kyphosis.  Lower back: Mild tenderness around the right lower lumbar paraspinal musculature.  He has a negative straight leg raise.  He has 5 out of 5 bilateral lower extremity strength.  He is neurovascularly intact distally.  His calves are soft nontender.    Diagnostic Test Results:  none         Assessment & Plan       ICD-10-CM    1. Right-sided low back pain with right-sided sciatica, unspecified chronicity M54.41      At this time we will treat conservatively with continued ice and heat and Tylenol.  Warning signs discussed with him.  Encourage active range of motion and activity modification.  He will follow-up in a week or 2 if he continues to have problems.    Return in about 2 weeks (around 10/15/2019) for recheck, As Needed.    Jonathan Oliver PA-C  [facility]

## 2019-10-16 DIAGNOSIS — I10 HYPERTENSION GOAL BP (BLOOD PRESSURE) < 140/90: ICD-10-CM

## 2019-10-16 RX ORDER — LISINOPRIL 10 MG/1
TABLET ORAL
Qty: 90 TABLET | Refills: 1 | Status: SHIPPED | OUTPATIENT
Start: 2019-10-16 | End: 2020-02-25

## 2020-02-05 ENCOUNTER — DOCUMENTATION ONLY (OUTPATIENT)
Dept: FAMILY MEDICINE | Facility: CLINIC | Age: 80
End: 2020-02-05

## 2020-02-05 DIAGNOSIS — I10 HYPERTENSION GOAL BP (BLOOD PRESSURE) < 140/90: Primary | ICD-10-CM

## 2020-02-05 DIAGNOSIS — E78.5 HYPERLIPIDEMIA LDL GOAL <130: ICD-10-CM

## 2020-02-06 NOTE — PROGRESS NOTES
Please review lab orders sign and close encounter.  Labs on 02/12/2020 & BP and Cholesterol Ck. On 02/25/2020.  Katrina ROCKWELL

## 2020-02-12 DIAGNOSIS — E78.5 HYPERLIPIDEMIA LDL GOAL <130: ICD-10-CM

## 2020-02-12 DIAGNOSIS — I10 HYPERTENSION GOAL BP (BLOOD PRESSURE) < 140/90: ICD-10-CM

## 2020-02-12 LAB
ANION GAP SERPL CALCULATED.3IONS-SCNC: 4 MMOL/L (ref 3–14)
BUN SERPL-MCNC: 7 MG/DL (ref 7–30)
CALCIUM SERPL-MCNC: 9.5 MG/DL (ref 8.5–10.1)
CHLORIDE SERPL-SCNC: 105 MMOL/L (ref 94–109)
CHOLEST SERPL-MCNC: 152 MG/DL
CO2 SERPL-SCNC: 29 MMOL/L (ref 20–32)
CREAT SERPL-MCNC: 0.7 MG/DL (ref 0.66–1.25)
GFR SERPL CREATININE-BSD FRML MDRD: 89 ML/MIN/{1.73_M2}
GLUCOSE SERPL-MCNC: 88 MG/DL (ref 70–99)
HDLC SERPL-MCNC: 64 MG/DL
LDLC SERPL CALC-MCNC: 71 MG/DL
NONHDLC SERPL-MCNC: 88 MG/DL
POTASSIUM SERPL-SCNC: 4.3 MMOL/L (ref 3.4–5.3)
SODIUM SERPL-SCNC: 138 MMOL/L (ref 133–144)
TRIGL SERPL-MCNC: 83 MG/DL

## 2020-02-12 PROCEDURE — 80048 BASIC METABOLIC PNL TOTAL CA: CPT | Performed by: PHYSICIAN ASSISTANT

## 2020-02-12 PROCEDURE — 36415 COLL VENOUS BLD VENIPUNCTURE: CPT | Performed by: PHYSICIAN ASSISTANT

## 2020-02-12 PROCEDURE — 80061 LIPID PANEL: CPT | Performed by: PHYSICIAN ASSISTANT

## 2020-02-12 NOTE — LETTER
February 12, 2020      Markel Padilla  [address]  [place] [address]        Mr. Padilla,     All of your labs were normal/near normal for you.     Please contact the clinic if you have additional questions.  Thank you.     Sincerely,     Jonathan Oliver PA-C / gi    Resulted Orders   Basic metabolic panel   Result Value Ref Range    Sodium 138 133 - 144 mmol/L    Potassium 4.3 3.4 - 5.3 mmol/L    Chloride 105 94 - 109 mmol/L    Carbon Dioxide 29 20 - 32 mmol/L    Anion Gap 4 3 - 14 mmol/L    Glucose 88 70 - 99 mg/dL      Comment:      Non Fasting    Urea Nitrogen 7 7 - 30 mg/dL    Creatinine 0.70 0.66 - 1.25 mg/dL    GFR Estimate 89 >60 mL/min/[1.73_m2]      Comment:      Non  GFR Calc  Starting 12/18/2018, serum creatinine based estimated GFR (eGFR) will be   calculated using the Chronic Kidney Disease Epidemiology Collaboration   (CKD-EPI) equation.      GFR Estimate If Black >90 >60 mL/min/[1.73_m2]      Comment:       GFR Calc  Starting 12/18/2018, serum creatinine based estimated GFR (eGFR) will be   calculated using the Chronic Kidney Disease Epidemiology Collaboration   (CKD-EPI) equation.      Calcium 9.5 8.5 - 10.1 mg/dL   Lipid panel reflex to direct LDL Fasting   Result Value Ref Range    Cholesterol 152 <200 mg/dL    Triglycerides 83 <150 mg/dL      Comment:      Non Fasting    HDL Cholesterol 64 >39 mg/dL    LDL Cholesterol Calculated 71 <100 mg/dL      Comment:      Desirable:       <100 mg/dl    Non HDL Cholesterol 88 <130 mg/dL

## 2020-02-12 NOTE — RESULT ENCOUNTER NOTE
Mr. Padilla,    All of your labs were normal/near normal for you.    Please contact the clinic if you have additional questions.  Thank you.    Sincerely,    Jonathan Oliver PA-C

## 2020-02-25 ENCOUNTER — OFFICE VISIT (OUTPATIENT)
Dept: FAMILY MEDICINE | Facility: CLINIC | Age: 80
End: 2020-02-25
Payer: COMMERCIAL

## 2020-02-25 VITALS
DIASTOLIC BLOOD PRESSURE: 70 MMHG | OXYGEN SATURATION: 97 % | HEIGHT: 64 IN | BODY MASS INDEX: 29.71 KG/M2 | HEART RATE: 61 BPM | WEIGHT: 174 LBS | SYSTOLIC BLOOD PRESSURE: 138 MMHG | RESPIRATION RATE: 18 BRPM

## 2020-02-25 DIAGNOSIS — E78.5 HYPERLIPIDEMIA LDL GOAL <130: ICD-10-CM

## 2020-02-25 DIAGNOSIS — M65.30 TRIGGER FINGER, ACQUIRED: ICD-10-CM

## 2020-02-25 DIAGNOSIS — I10 HYPERTENSION GOAL BP (BLOOD PRESSURE) < 140/90: Primary | ICD-10-CM

## 2020-02-25 PROCEDURE — 99213 OFFICE O/P EST LOW 20 MIN: CPT | Performed by: PHYSICIAN ASSISTANT

## 2020-02-25 RX ORDER — SIMVASTATIN 40 MG
40 TABLET ORAL AT BEDTIME
Qty: 90 TABLET | Refills: 1 | Status: SHIPPED | OUTPATIENT
Start: 2020-02-25 | End: 2020-09-21

## 2020-02-25 RX ORDER — LISINOPRIL 10 MG/1
10 TABLET ORAL DAILY
Qty: 90 TABLET | Refills: 1 | Status: SHIPPED | OUTPATIENT
Start: 2020-02-25 | End: 2020-09-21

## 2020-02-25 ASSESSMENT — MIFFLIN-ST. JEOR: SCORE: 1415.26

## 2020-02-25 NOTE — PROGRESS NOTES
Subjective     Markel Padilla is a 79 year old male who presents to clinic today for the following health issues:    HPI   Hyperlipidemia Follow-Up      Are you regularly taking any medication or supplement to lower your cholesterol?   Yes- Simvastatin     Are you having muscle aches or other side effects that you think could be caused by your cholesterol lowering medication?  No    Hypertension Follow-up      Do you check your blood pressure regularly outside of the clinic? Yes     Are you following a low salt diet? Yes    Are your blood pressures ever more than 140 on the top number (systolic) OR more   than 90 on the bottom number (diastolic), for example 140/90? No    Also for couple months he has noticed his right long finger will lock up on him he will use his other hand to straighten it out.  He does not have any pain.  He denies any numbness or tingling.  He states 4 years ago he had the same thing happened to his thumb and he had a cortisone injection has been fine.  States he was having more pain in his thumb at the time.    Patient Active Problem List   Diagnosis     Erectile dysfunction     Hypertension goal BP (blood pressure) < 140/90     Hyperlipidemia LDL goal <130     Overweight (BMI 25.0-29.9)     Advanced directives, counseling/discussion     Hearing loss     Sleep disturbance     Eczema     Gastroesophageal reflux disease, esophagitis presence not specified     Hearing loss, bilateral     Raynaud's disease without gangrene     Trigger finger, acquired     Past Surgical History:   Procedure Laterality Date     NO HISTORY OF SURGERY         Social History     Tobacco Use     Smoking status: Former Smoker     Types: Cigarettes     Last attempt to quit: 1979     Years since quittin.3     Smokeless tobacco: Current User     Types: Chew     Last attempt to quit: 1989   Substance Use Topics     Alcohol use: Yes     Comment: rare     Family History   Problem Relation Age of Onset      "Heart Disease Mother      Lipids Mother      C.A.D. Father         age 65     Respiratory Sister         COPD     Hypertension Son      Hypertension Son          Current Outpatient Medications   Medication Sig Dispense Refill     aspirin 81 MG tablet Take 1 tablet by mouth daily.       calcium carbonate (OS-FREDIS 500 MG [place]. [place]) 500 MG tablet Take 1 tablet (500 mg) by mouth 2 times daily 180 tablet 3     ferrous sulfate 325 (65 FE) MG tablet Take by mouth daily (with breakfast)       FISH OIL 1000 MG OR CAPS 1 tablet daily       GLUCOSAMINE CHONDR 1500 COMPLX OR 1 tablet daily       lisinopril (ZESTRIL) 10 MG tablet Take 1 tablet (10 mg) by mouth daily 90 tablet 1     MULTI-VITAMIN OR TABS 1 TABLET DAILY       omeprazole (PRILOSEC) 20 MG DR capsule Take 1 capsule (20 mg) by mouth daily 90 capsule 3     simvastatin (ZOCOR) 40 MG tablet Take 1 tablet (40 mg) by mouth At Bedtime 90 tablet 1     triamcinolone (KENALOG) 0.5 % cream Apply sparingly to affected area three times daily. 30 g 0     Allergies   Allergen Reactions     Nkda [No Known Drug Allergies]      BP Readings from Last 3 Encounters:   02/25/20 138/70   10/01/19 138/80   06/28/19 142/62    Wt Readings from Last 3 Encounters:   02/25/20 78.9 kg (174 lb)   10/01/19 74.8 kg (165 lb)   06/28/19 77.6 kg (171 lb)                    Reviewed and updated as needed this visit by Provider  Tobacco  Allergies  Meds  Problems  Med Hx  Surg Hx  Fam Hx         Review of Systems   ROS COMP: Constitutional, HEENT, cardiovascular, pulmonary, gi and gu systems are negative, except as otherwise noted.      Objective    /70   Pulse 61   Resp 18   Ht 1.626 m (5' 4\")   Wt 78.9 kg (174 lb)   SpO2 97%   BMI 29.87 kg/m    Body mass index is 29.87 kg/m .  Physical Exam   GENERAL: healthy, alert and no distress  NECK: no adenopathy, no asymmetry, masses, or scars and thyroid normal to palpation  RESP: lungs clear to auscultation - no rales, rhonchi or wheezes  CV: " regular rate and rhythm, normal S1 S2, no S3 or S4, no murmur, click or rub, no peripheral edema and peripheral pulses strong  ABDOMEN: soft, nontender, no hepatosplenomegaly, no masses and bowel sounds normal  MS: no gross musculoskeletal defects noted, no edema  Right long finger with full range of motion.  No triggering at the time of visit.    Diagnostic Test Results:  Labs reviewed in Epic        Assessment & Plan       ICD-10-CM    1. Hypertension goal BP (blood pressure) < 140/90 I10 lisinopril (ZESTRIL) 10 MG tablet   2. Hyperlipidemia LDL goal <130 E78.5 simvastatin (ZOCOR) 40 MG tablet   3. Trigger finger, acquired M65.30 ORTHOPEDICS ADULT REFERRAL   1-2. LABS reviewed. medical conditions are stable. meds refilled. Recheck blood pressure in 6 months.   3.  Talk to patient about his concerns.  At this point since has not really given him too much trouble we will have him watch this and treat this conservatively.  If his symptoms worsen or pain develops he will follow-up with orthopedics.    Return in about 6 months (around 8/25/2020) for recheck.    Jonathan Oliver PA-C  [facility]

## 2020-02-25 NOTE — PATIENT INSTRUCTIONS
Patient Education     Treating Trigger Finger    Trigger finger occurs when the tissue inside your finger or thumb becomes inflamed. Mild cases can be treated without surgery. If the problem is severe, surgery may be needed. Your healthcare provider will talk with you about your options.  Nonsurgical treatment  For mild symptoms, your healthcare provider may have you rest the finger or thumb. You may also be told to take anti-inflammatory medicines. These include ibuprofen or aspirin. You may be given an injection of medicine in the base of the finger or thumb. This typically is a steroid, such as cortisone.  Surgery  If nonsurgical treatments don t ease your symptoms, you may need surgery. A tendon is a cordlike fiber that attaches muscle to bone and allows joints to bend. The tendon is surrounded by a protective cover called a sheath. During surgery, the sheath in your finger or thumb is opened to enlarge the space and release the swollen tendon. This allows the finger or thumb to bend and straighten normally. Surgery takes about 20 minutes. It can often be done using a local anesthetic. You may also be sedated. You will likely be able to go home the same day. Your hand will be wrapped in a soft bandage. You may need to wear a plaster splint for a short time to keep the finger or thumb still as it heals. The stitches will be removed in about 2 weeks. Your healthcare provider will talk with you about the risks and benefits of surgery.  Date Last Reviewed: 1/1/2018 2000-2019 [employer]. [address] [place] [address]. All rights reserved. This information is not intended as a substitute for professional medical care. Always follow your healthcare professional's instructions.           Patient Education     What is Trigger Finger?  Trigger finger is an inflammation of tissue inside your finger or thumb. It is also called tenosynovitis (ten-oh-sin-oh-VY-tis). Tendons (cordlike fibers  that attach muscle to bone and allow you to bend the joints) become swollen. So does the synovium (a slick membrane that allows the tendons to move easily). This makes it hard to straighten the finger or thumb.    Causes  Repeated use of a tool with strong gripping, such as a drill or wrench, can irritate and inflame the tendons and the synovium. It is also more common in certain medical conditions, such as rheumatoid arthritis, gout, and diabetes. But often the cause of trigger finger is unknown.  Inside your finger  Tendons connect muscles in your forearm to the bones in your fingers. The tendons in each finger are surrounded by a protective tendon sheath. This sheath is lined with synovium, which produces a fluid that allows the tendons to slide easily when you bend and straighten the finger. If a tendon is irritated, it becomes inflamed.  When a tendon is inflamed  When a tendon is inflamed, it causes the lining of the tendon sheath to swell and thicken. Or the tendon itself may thicken. Then the sheath pinches the tendon. The tendon can then no longer slide easily inside the sheath. When you straighten your finger, the tendon sticks or  locks  as it tries to squeeze back through the sheath.    Symptoms  The first sign of trigger finger may be pain where the finger or thumb joins the palm. You may also notice some swelling. As the tendon becomes more inflamed, the finger may start to catch when you try to straighten or bend it. When the locked tendon releases, the finger jumps, as if you were releasing the trigger of a gun. This further irritates the tendon. It may set up a cycle of catching and swelling.   Date Last Reviewed: 1/1/2018 2000-2019 [employer]. [address] [place] [id number]. All rights reserved. This information is not intended as a substitute for professional medical care. Always follow your healthcare professional's instructions.           Patient Education     What  is Trigger Finger?  Trigger finger is an inflammation of tissue inside your finger or thumb. It is also called tenosynovitis (ten-oh-sin-oh-VY-tis). Tendons (cordlike fibers that attach muscle to bone and allow you to bend the joints) become swollen. So does the synovium (a slick membrane that allows the tendons to move easily). This makes it hard to straighten the finger or thumb.    Causes  Repeated use of a tool with strong gripping, such as a drill or wrench, can irritate and inflame the tendons and the synovium. It is also more common in certain medical conditions, such as rheumatoid arthritis, gout, and diabetes. But often the cause of trigger finger is unknown.  Inside your finger  Tendons connect muscles in your forearm to the bones in your fingers. The tendons in each finger are surrounded by a protective tendon sheath. This sheath is lined with synovium, which produces a fluid that allows the tendons to slide easily when you bend and straighten the finger. If a tendon is irritated, it becomes inflamed.  When a tendon is inflamed  When a tendon is inflamed, it causes the lining of the tendon sheath to swell and thicken. Or the tendon itself may thicken. Then the sheath pinches the tendon. The tendon can then no longer slide easily inside the sheath. When you straighten your finger, the tendon sticks or  locks  as it tries to squeeze back through the sheath.    Symptoms  The first sign of trigger finger may be pain where the finger or thumb joins the palm. You may also notice some swelling. As the tendon becomes more inflamed, the finger may start to catch when you try to straighten or bend it. When the locked tendon releases, the finger jumps, as if you were releasing the trigger of a gun. This further irritates the tendon. It may set up a cycle of catching and swelling.   Date Last Reviewed: 1/1/2018 2000-2019 [employer]. [address] [place] [address], [place] [address]. All rights reserved.  This information is not intended as a substitute for professional medical care. Always follow your healthcare professional's instructions.           Patient Education     Treating Trigger Finger    Trigger finger occurs when the tissue inside your finger or thumb becomes inflamed. Mild cases can be treated without surgery. If the problem is severe, surgery may be needed. Your healthcare provider will talk with you about your options.  Nonsurgical treatment  For mild symptoms, your healthcare provider may have you rest the finger or thumb. You may also be told to take anti-inflammatory medicines. These include ibuprofen or aspirin. You may be given an injection of medicine in the base of the finger or thumb. This typically is a steroid, such as cortisone.  Surgery  If nonsurgical treatments don t ease your symptoms, you may need surgery. A tendon is a cordlike fiber that attaches muscle to bone and allows joints to bend. The tendon is surrounded by a protective cover called a sheath. During surgery, the sheath in your finger or thumb is opened to enlarge the space and release the swollen tendon. This allows the finger or thumb to bend and straighten normally. Surgery takes about 20 minutes. It can often be done using a local anesthetic. You may also be sedated. You will likely be able to go home the same day. Your hand will be wrapped in a soft bandage. You may need to wear a plaster splint for a short time to keep the finger or thumb still as it heals. The stitches will be removed in about 2 weeks. Your healthcare provider will talk with you about the risks and benefits of surgery.  Date Last Reviewed: 1/1/2018 2000-2019 [employer]. [address] [place] [address], [place] [address]. All rights reserved. This information is not intended as a substitute for professional medical care. Always follow your healthcare professional's instructions.         [facility]    If you have any questions  regarding to your visit please contact:       Team Jt:   Clinic Hours Telephone Number   Jonathan Oliver PA-C 7am-7pm  Monday - Thursday   7am-5pm  Fridays  [phone] fax    Audrey/RN     After Hours Nurse Advise and Appts.   [facility] Nurse Advisors: [phone]  [facility] On Call: to make appointments anytime - [phone]    Urgent Care -   [facility] 12pm-8pm Monday-Friday  9am-5pm Saturday-Sunday      5pm-9pm Monday-Friday  9am-5pm Saturday-Sunday [phone] - [facility]        [phone] - [place]       After hours, weekend or if you need to make an appointment with your primary provider please call [phone].     If you need a medication refill please contact your pharmacy.   Please allow 3 business days for your refills to be completed.    Use [employer] (secure email communication and access to your chart) to send your primary care provider a message or make an appointment. Ask someone on your Team how to sign up for [employer]. [employer] Information [phone] (toll-free)  [employer] now has an deepika for your smart phone.  Oncare: is an Online virtual visit. You can see more about that at [email or web address] website.  If you are on [employer] we can do e-visits which is another form of virtual visit on the computer. This is an electronic visit that will go directly to your provider.  Like an in person visit, there is a charge for these, though only $45. Some medical conditions can be done through an e-visit.  You maybe receiving a survey in the mail regarding your visit today.  I would appreciate a score of 9 or 10 or it is considered a failed score. Please provide feed back if you are unable to provide a score or 9 or 10.  Recommendations of me to your friends and family would also be helpful . Thanks for your time and quick response.     Jonathan Oliver PA-C

## 2020-06-20 DIAGNOSIS — K21.9 GASTROESOPHAGEAL REFLUX DISEASE, ESOPHAGITIS PRESENCE NOT SPECIFIED: ICD-10-CM

## 2020-09-20 DIAGNOSIS — I10 HYPERTENSION GOAL BP (BLOOD PRESSURE) < 140/90: ICD-10-CM

## 2020-09-20 DIAGNOSIS — K21.9 GASTROESOPHAGEAL REFLUX DISEASE, ESOPHAGITIS PRESENCE NOT SPECIFIED: ICD-10-CM

## 2020-09-20 DIAGNOSIS — E78.5 HYPERLIPIDEMIA LDL GOAL <130: ICD-10-CM

## 2020-09-21 RX ORDER — LISINOPRIL 10 MG/1
TABLET ORAL
Qty: 90 TABLET | Refills: 0 | Status: SHIPPED | OUTPATIENT
Start: 2020-09-21 | End: 2020-09-29

## 2020-09-21 RX ORDER — SIMVASTATIN 40 MG
TABLET ORAL
Qty: 90 TABLET | Refills: 1 | Status: SHIPPED | OUTPATIENT
Start: 2020-09-21 | End: 2021-04-12

## 2020-09-21 NOTE — TELEPHONE ENCOUNTER
TC, patient due for:  Medicare Annual Wellness exam     Health Maintenance Due   Topic Date Due     ADVANCE CARE PLANNING  06/07/2016     MEDICARE ANNUAL WELLNESS VISIT  01/27/2017     PNEUMOCOCCAL IMMUNIZATION 65+ LOW/MEDIUM RISK (2 of 2 - PPSV23) 01/10/2018     FALL RISK ASSESSMENT  05/22/2020     BMP  08/12/2020     INFLUENZA VACCINE (1) 09/01/2020     Milena MUNOZN, RN

## 2020-09-24 ENCOUNTER — DOCUMENTATION ONLY (OUTPATIENT)
Dept: LAB | Facility: CLINIC | Age: 80
End: 2020-09-24

## 2020-09-24 DIAGNOSIS — I10 HYPERTENSION GOAL BP (BLOOD PRESSURE) < 140/90: Primary | ICD-10-CM

## 2020-09-26 DIAGNOSIS — I10 HYPERTENSION GOAL BP (BLOOD PRESSURE) < 140/90: ICD-10-CM

## 2020-09-26 PROCEDURE — 36415 COLL VENOUS BLD VENIPUNCTURE: CPT | Performed by: PHYSICIAN ASSISTANT

## 2020-09-26 PROCEDURE — 80048 BASIC METABOLIC PNL TOTAL CA: CPT | Performed by: PHYSICIAN ASSISTANT

## 2020-09-28 LAB
ANION GAP SERPL CALCULATED.3IONS-SCNC: 7 MMOL/L (ref 3–14)
BUN SERPL-MCNC: 8 MG/DL (ref 7–30)
CALCIUM SERPL-MCNC: 8.9 MG/DL (ref 8.5–10.1)
CHLORIDE SERPL-SCNC: 102 MMOL/L (ref 94–109)
CO2 SERPL-SCNC: 25 MMOL/L (ref 20–32)
CREAT SERPL-MCNC: 0.77 MG/DL (ref 0.66–1.25)
GFR SERPL CREATININE-BSD FRML MDRD: 86 ML/MIN/{1.73_M2}
GLUCOSE SERPL-MCNC: 86 MG/DL (ref 70–99)
POTASSIUM SERPL-SCNC: 4.2 MMOL/L (ref 3.4–5.3)
SODIUM SERPL-SCNC: 134 MMOL/L (ref 133–144)

## 2020-09-29 ENCOUNTER — OFFICE VISIT (OUTPATIENT)
Dept: FAMILY MEDICINE | Facility: CLINIC | Age: 80
End: 2020-09-29
Payer: COMMERCIAL

## 2020-09-29 VITALS
DIASTOLIC BLOOD PRESSURE: 64 MMHG | SYSTOLIC BLOOD PRESSURE: 132 MMHG | RESPIRATION RATE: 16 BRPM | HEART RATE: 60 BPM | WEIGHT: 175 LBS | BODY MASS INDEX: 29.88 KG/M2 | OXYGEN SATURATION: 98 % | HEIGHT: 64 IN

## 2020-09-29 DIAGNOSIS — E78.00 HIGH CHOLESTEROL: Primary | ICD-10-CM

## 2020-09-29 DIAGNOSIS — I10 HYPERTENSION GOAL BP (BLOOD PRESSURE) < 140/90: ICD-10-CM

## 2020-09-29 PROCEDURE — 99213 OFFICE O/P EST LOW 20 MIN: CPT | Performed by: PHYSICIAN ASSISTANT

## 2020-09-29 RX ORDER — LISINOPRIL 10 MG/1
10 TABLET ORAL DAILY
Qty: 90 TABLET | Refills: 0 | Status: SHIPPED | OUTPATIENT
Start: 2020-09-29 | End: 2020-12-30

## 2020-09-29 ASSESSMENT — MIFFLIN-ST. JEOR: SCORE: 1414.79

## 2020-09-29 NOTE — PROGRESS NOTES
"Subjective     Markel Padilla is a 80 year old male who presents to clinic today for the following health issues:    HPI       Hyperlipidemia Follow-Up      Are you regularly taking any medication or supplement to lower your cholesterol?   Yes- Simvastatin     Are you having muscle aches or other side effects that you think could be caused by your cholesterol lowering medication?  No    Hypertension Follow-up      Do you check your blood pressure regularly outside of the clinic? Yes     Are you following a low salt diet? Yes    Are your blood pressures ever more than 140 on the top number (systolic) OR more   than 90 on the bottom number (diastolic), for example 140/90? No    Review of Systems   Constitutional, HEENT, cardiovascular, pulmonary, gi and gu systems are negative, except as otherwise noted.      Objective    /64   Pulse 60   Resp 16   Ht 1.626 m (5' 4\")   Wt 79.4 kg (175 lb)   SpO2 98%   BMI 30.04 kg/m    Body mass index is 30.04 kg/m .  Physical Exam   GENERAL: healthy, alert and no distress  NECK: no adenopathy, no asymmetry, masses, or scars and thyroid normal to palpation  RESP: lungs clear to auscultation - no rales, rhonchi or wheezes  CV: regular rate and rhythm, normal S1 S2, no S3 or S4, no murmur, click or rub, no peripheral edema and peripheral pulses strong  ABDOMEN: soft, nontender, no hepatosplenomegaly, no masses and bowel sounds normal  MS: no gross musculoskeletal defects noted, no edema    Labs reviewed.         Assessment & Plan       ICD-10-CM    1. High cholesterol  E78.00    2. Hypertension goal BP (blood pressure) < 140/90  I10 lisinopril (ZESTRIL) 10 MG tablet   medical conditions are stable. meds refilled.  Recheck 6months at patient request.       Return in about 6 months (around 3/29/2021) for recheck.    Jonathan Oliver PA-C  [facility]    "

## 2020-09-30 NOTE — PROGRESS NOTES
SUBJECTIVE:   Markel Padilla is a 80 year old male who is seen in consultation at the request of  Jonathan Oliver PA-C for evaluation of difficulties with the right  3rd finger that has been present approximately 8 months.   No known injury.   Pain is located in the finger and the palm area over the A1 pulley of the right  3rd finger    Other symptoms:  locking of the finger  Treatments tried to this point: none    Previous hx of similar problems: right thumb was a trigger thumb 45 years ago and he had a corticosteroid injection, which eliminated the problem.  He would like a shot today if possible.    Past Medical History:   Past Medical History:   Diagnosis Date     Ehrlichiosis      Hemorrhage gastric      Hypercholesteremia      Hypertension      MEDICAL HISTORY OF -     major forearm laceration     Past Surgical History:   Past Surgical History:   Procedure Laterality Date     NO HISTORY OF SURGERY       Family History:   Family History   Problem Relation Age of Onset     Heart Disease Mother      Lipids Mother      C.A.D. Father         age 65     Respiratory Sister         COPD     Hypertension Son      Hypertension Son      Social History:   Social History     Tobacco Use     Smoking status: Former Smoker     Types: Cigarettes     Last attempt to quit: 1979     Years since quittin.9     Smokeless tobacco: Current User     Types: Chew     Last attempt to quit: 1989   Substance Use Topics     Alcohol use: Yes     Comment: rare       Review of Systems:  Constitutional:  NEGATIVE for fever, chills, change in weight  Integumentary/Skin:  NEGATIVE for worrisome rashes, moles or lesions  Eyes:  NEGATIVE for vision changes or irritation  ENT/Mouth:  NEGATIVE for ear, mouth and throat problems  Resp:  NEGATIVE for significant cough or SOB  Breast:  NEGATIVE for masses, tenderness or discharge  CV:  NEGATIVE for chest pain, palpitations or peripheral edema  GI:  NEGATIVE for nausea, abdominal  "pain, heartburn, or change in bowel habits  :  Negative   Musculoskeletal:  See HPI above  Neuro:  NEGATIVE for weakness, dizziness or paresthesias  Endocrine:  NEGATIVE for temperature intolerance, skin/hair changes  Heme/allergy/immune:  NEGATIVE for bleeding problems  Psychiatric:  NEGATIVE for changes in mood or affect      OBJECTIVE:  Physical Exam:  BP (!) 198/80 (BP Location: Right arm, Patient Position: Sitting, Cuff Size: Adult Regular)   Pulse 62   Ht 1.626 m (5' 4\")   Wt 79.4 kg (175 lb)   SpO2 99%   BMI 30.04 kg/m    General Appearance: healthy, alert and no distress   Skin: no suspicious lesions or rashes  Neuro: Normal strength and tone, mentation intact and speech normal  Vascular: good pulses, and cappillary refill   Lymph: no lymphadenopathy   Psych:  mentation appears normal and affect normal/bright  Resp: no increased work of breathing     Right Hand Exam:  Has tenderness over the right  3rd finger A1 pulley(s),    locking/triggering demonstrated of the right  3rd finger,      ASSESSMENT:   Right 3rd trigger finger    PLAN:   We talked about the options: taping/splinting the PIP joint periodically, corticosteroid injection, and trigger finger release. I have explained the nature of the surgical procedure, the risks and recovery time with the patient.   he has decided to proceed with corticosteroid injection     Procedure Note:   Informed consent obtained. Risks, benefits and complications of the injection were discussed with the patient and the patient elected to proceed. A Right 3rd trigger finger/flexor tenosynovial, A1 pulley- area steroid injection was performed using 0.5 cc  Kenalog-40 40mg per mL after sterile prep. This was tolerated well by the patient.     Return to clinic: as needed     KEVON Mejia MD  Dept. Orthopedic Surgery  [facility]   "

## 2020-10-01 ENCOUNTER — OFFICE VISIT (OUTPATIENT)
Dept: ORTHOPEDICS | Facility: CLINIC | Age: 80
End: 2020-10-01
Attending: PHYSICIAN ASSISTANT
Payer: COMMERCIAL

## 2020-10-01 VITALS
SYSTOLIC BLOOD PRESSURE: 198 MMHG | HEIGHT: 64 IN | BODY MASS INDEX: 29.88 KG/M2 | WEIGHT: 175 LBS | OXYGEN SATURATION: 99 % | DIASTOLIC BLOOD PRESSURE: 80 MMHG | HEART RATE: 62 BPM

## 2020-10-01 DIAGNOSIS — M65.30 TRIGGER FINGER, ACQUIRED: Primary | ICD-10-CM

## 2020-10-01 PROCEDURE — 99203 OFFICE O/P NEW LOW 30 MIN: CPT | Mod: 25 | Performed by: ORTHOPAEDIC SURGERY

## 2020-10-01 PROCEDURE — 20550 NJX 1 TENDON SHEATH/LIGAMENT: CPT | Performed by: ORTHOPAEDIC SURGERY

## 2020-10-01 RX ADMIN — TRIAMCINOLONE ACETONIDE 20 MG: 40 INJECTION, SUSPENSION INTRA-ARTICULAR; INTRAMUSCULAR at 11:29

## 2020-10-01 ASSESSMENT — MIFFLIN-ST. JEOR: SCORE: 1414.79

## 2020-10-01 NOTE — LETTER
10/1/2020         RE: Markel Padilla  [address] [place] [address]        Dear Colleague,    Thank you for referring your patient, Markel Padilla, to the [facility]. Please see a copy of my visit note below.    SUBJECTIVE:   Markel Padilla is a 80 year old male who is seen in consultation at the request of  Jonathan Oliver PA-C for evaluation of difficulties with the right  3rd finger that has been present approximately 8 months.   No known injury.   Pain is located in the finger and the palm area over the A1 pulley of the right  3rd finger    Other symptoms:  locking of the finger  Treatments tried to this point: none    Previous hx of similar problems: right thumb was a trigger thumb 45 years ago and he had a corticosteroid injection, which eliminated the problem.  He would like a shot today if possible.    Past Medical History:   Past Medical History:   Diagnosis Date     Ehrlichiosis      Hemorrhage gastric      Hypercholesteremia      Hypertension      MEDICAL HISTORY OF -     major forearm laceration     Past Surgical History:   Past Surgical History:   Procedure Laterality Date     NO HISTORY OF SURGERY       Family History:   Family History   Problem Relation Age of Onset     Heart Disease Mother      Lipids Mother      C.A.D. Father         age 65     Respiratory Sister         COPD     Hypertension Son      Hypertension Son      Social History:   Social History     Tobacco Use     Smoking status: Former Smoker     Types: Cigarettes     Last attempt to quit: 1979     Years since quittin.9     Smokeless tobacco: Current User     Types: Chew     Last attempt to quit: 1989   Substance Use Topics     Alcohol use: Yes     Comment: rare       Review of Systems:  Constitutional:  NEGATIVE for fever, chills, change in weight  Integumentary/Skin:  NEGATIVE for worrisome rashes, moles or lesions  Eyes:  NEGATIVE for vision changes or  "irritation  ENT/Mouth:  NEGATIVE for ear, mouth and throat problems  Resp:  NEGATIVE for significant cough or SOB  Breast:  NEGATIVE for masses, tenderness or discharge  CV:  NEGATIVE for chest pain, palpitations or peripheral edema  GI:  NEGATIVE for nausea, abdominal pain, heartburn, or change in bowel habits  :  Negative   Musculoskeletal:  See HPI above  Neuro:  NEGATIVE for weakness, dizziness or paresthesias  Endocrine:  NEGATIVE for temperature intolerance, skin/hair changes  Heme/allergy/immune:  NEGATIVE for bleeding problems  Psychiatric:  NEGATIVE for changes in mood or affect      OBJECTIVE:  Physical Exam:  BP (!) 198/80 (BP Location: Right arm, Patient Position: Sitting, Cuff Size: Adult Regular)   Pulse 62   Ht 1.626 m (5' 4\")   Wt 79.4 kg (175 lb)   SpO2 99%   BMI 30.04 kg/m    General Appearance: healthy, alert and no distress   Skin: no suspicious lesions or rashes  Neuro: Normal strength and tone, mentation intact and speech normal  Vascular: good pulses, and cappillary refill   Lymph: no lymphadenopathy   Psych:  mentation appears normal and affect normal/bright  Resp: no increased work of breathing     Right Hand Exam:  Has tenderness over the right  3rd finger A1 pulley(s),    locking/triggering demonstrated of the right  3rd finger,      ASSESSMENT:   Right 3rd trigger finger    PLAN:   We talked about the options: taping/splinting the PIP joint periodically, corticosteroid injection, and trigger finger release. I have explained the nature of the surgical procedure, the risks and recovery time with the patient.   he has decided to proceed with corticosteroid injection     Procedure Note:   Informed consent obtained. Risks, benefits and complications of the injection were discussed with the patient and the patient elected to proceed. A Right 3rd trigger finger/flexor tenosynovial, A1 pulley- area steroid injection was performed using 0.5 cc  Kenalog-40 40mg per mL after sterile prep. " This was tolerated well by the patient.     Return to clinic: as needed     KEVON Mejia MD  Dept. Orthopedic Surgery  [facility]     Procedures        Again, thank you for allowing me to participate in the care of your patient.        Sincerely,        Zack Mejia MD

## 2020-10-01 NOTE — PROGRESS NOTES
Hand / Upper Extremity Injection/Arthrocentesis: R long A1    Date/Time: 10/1/2020 11:29 AM  Performed by: Zack Mejia MD  Authorized by: Zack Mejia MD     Indications:  Pain  Needle Size:  25 G  Guidance: landmark    Approach:  Volar  Condition: trigger finger    Location:  Long finger    Site:  R long A1  Medications:  20 mg triamcinolone 40 MG/ML  Outcome:  Tolerated well, no immediate complications  Procedure discussed: discussed risks, benefits, and alternatives    Consent Given by:  Patient  Timeout: timeout called immediately prior to procedure    Prep: patient was prepped and draped in usual sterile fashion

## 2020-10-08 RX ORDER — TRIAMCINOLONE ACETONIDE 40 MG/ML
20 INJECTION, SUSPENSION INTRA-ARTICULAR; INTRAMUSCULAR
Status: DISCONTINUED | OUTPATIENT
Start: 2020-10-01 | End: 2021-01-28

## 2020-10-29 ENCOUNTER — ALLIED HEALTH/NURSE VISIT (OUTPATIENT)
Dept: NURSING | Facility: CLINIC | Age: 80
End: 2020-10-29
Payer: COMMERCIAL

## 2020-10-29 DIAGNOSIS — Z23 NEED FOR PROPHYLACTIC VACCINATION AND INOCULATION AGAINST INFLUENZA: Primary | ICD-10-CM

## 2020-10-29 PROCEDURE — G0009 ADMIN PNEUMOCOCCAL VACCINE: HCPCS

## 2020-10-29 PROCEDURE — G0008 ADMIN INFLUENZA VIRUS VAC: HCPCS

## 2020-10-29 PROCEDURE — 90662 IIV NO PRSV INCREASED AG IM: CPT

## 2020-10-29 PROCEDURE — 90732 PPSV23 VACC 2 YRS+ SUBQ/IM: CPT

## 2020-11-25 ENCOUNTER — OFFICE VISIT (OUTPATIENT)
Dept: FAMILY MEDICINE | Facility: CLINIC | Age: 80
End: 2020-11-25
Payer: COMMERCIAL

## 2020-11-25 VITALS
TEMPERATURE: 97.3 F | RESPIRATION RATE: 16 BRPM | HEART RATE: 73 BPM | OXYGEN SATURATION: 99 % | SYSTOLIC BLOOD PRESSURE: 176 MMHG | DIASTOLIC BLOOD PRESSURE: 72 MMHG | BODY MASS INDEX: 30.62 KG/M2 | WEIGHT: 178.4 LBS

## 2020-11-25 DIAGNOSIS — R21 RASH: ICD-10-CM

## 2020-11-25 DIAGNOSIS — L29.9 PRURITIC DISORDER: Primary | ICD-10-CM

## 2020-11-25 LAB
ALBUMIN SERPL-MCNC: 3.7 G/DL (ref 3.4–5)
ALP SERPL-CCNC: 108 U/L (ref 40–150)
ALT SERPL W P-5'-P-CCNC: 34 U/L (ref 0–70)
ANION GAP SERPL CALCULATED.3IONS-SCNC: 6 MMOL/L (ref 3–14)
AST SERPL W P-5'-P-CCNC: 24 U/L (ref 0–45)
BILIRUB SERPL-MCNC: 1.1 MG/DL (ref 0.2–1.3)
BUN SERPL-MCNC: 10 MG/DL (ref 7–30)
CALCIUM SERPL-MCNC: 8.9 MG/DL (ref 8.5–10.1)
CHLORIDE SERPL-SCNC: 103 MMOL/L (ref 94–109)
CO2 SERPL-SCNC: 28 MMOL/L (ref 20–32)
CREAT SERPL-MCNC: 0.8 MG/DL (ref 0.66–1.25)
ERYTHROCYTE [DISTWIDTH] IN BLOOD BY AUTOMATED COUNT: 12.8 % (ref 10–15)
GFR SERPL CREATININE-BSD FRML MDRD: 84 ML/MIN/{1.73_M2}
GLUCOSE SERPL-MCNC: 107 MG/DL (ref 70–99)
HCT VFR BLD AUTO: 45.4 % (ref 40–53)
HGB BLD-MCNC: 15.5 G/DL (ref 13.3–17.7)
MCH RBC QN AUTO: 32.4 PG (ref 26.5–33)
MCHC RBC AUTO-ENTMCNC: 34.1 G/DL (ref 31.5–36.5)
MCV RBC AUTO: 95 FL (ref 78–100)
PLATELET # BLD AUTO: 145 10E9/L (ref 150–450)
POTASSIUM SERPL-SCNC: 4.5 MMOL/L (ref 3.4–5.3)
PROT SERPL-MCNC: 6.3 G/DL (ref 6.8–8.8)
RBC # BLD AUTO: 4.78 10E12/L (ref 4.4–5.9)
SODIUM SERPL-SCNC: 137 MMOL/L (ref 133–144)
TSH SERPL DL<=0.005 MIU/L-ACNC: 0.7 MU/L (ref 0.4–4)
WBC # BLD AUTO: 8.1 10E9/L (ref 4–11)

## 2020-11-25 PROCEDURE — 85027 COMPLETE CBC AUTOMATED: CPT | Performed by: PHYSICIAN ASSISTANT

## 2020-11-25 PROCEDURE — 99213 OFFICE O/P EST LOW 20 MIN: CPT | Performed by: PHYSICIAN ASSISTANT

## 2020-11-25 PROCEDURE — 84443 ASSAY THYROID STIM HORMONE: CPT | Performed by: PHYSICIAN ASSISTANT

## 2020-11-25 PROCEDURE — 36415 COLL VENOUS BLD VENIPUNCTURE: CPT | Performed by: PHYSICIAN ASSISTANT

## 2020-11-25 PROCEDURE — 80053 COMPREHEN METABOLIC PANEL: CPT | Performed by: PHYSICIAN ASSISTANT

## 2020-11-25 RX ORDER — CETIRIZINE HYDROCHLORIDE 10 MG/1
10 TABLET ORAL DAILY PRN
Qty: 30 TABLET | Refills: 1 | Status: SHIPPED | OUTPATIENT
Start: 2020-11-25 | End: 2021-01-28

## 2020-11-25 RX ORDER — TRIAMCINOLONE ACETONIDE 1 MG/G
CREAM TOPICAL 3 TIMES DAILY PRN
Qty: 80 G | Refills: 0 | Status: SHIPPED | OUTPATIENT
Start: 2020-11-25 | End: 2020-12-30

## 2020-11-25 NOTE — LETTER
[facility]  [address] [place] [id number] [phone]        November 29, 2020      Markel SAM Padilla  [address] [facility]  [place] [address]        Dear ,    We are writing to inform you of your test results.    Your test results fall within the expected range(s) or remain unchanged from previous results.  Please continue with current treatment plan.    Resulted Orders   Comprehensive metabolic panel (BMP + Alb, Alk Phos, ALT, AST, Total. Bili, TP)   Result Value Ref Range    Sodium 137 133 - 144 mmol/L    Potassium 4.5 3.4 - 5.3 mmol/L    Chloride 103 94 - 109 mmol/L    Carbon Dioxide 28 20 - 32 mmol/L    Anion Gap 6 3 - 14 mmol/L    Glucose 107 (H) 70 - 99 mg/dL    Urea Nitrogen 10 7 - 30 mg/dL    Creatinine 0.80 0.66 - 1.25 mg/dL    GFR Estimate 84 >60 mL/min/[1.73_m2]      Comment:      Non  GFR Calc  Starting 12/18/2018, serum creatinine based estimated GFR (eGFR) will be   calculated using the Chronic Kidney Disease Epidemiology Collaboration   (CKD-EPI) equation.      GFR Estimate If Black >90 >60 mL/min/[1.73_m2]      Comment:       GFR Calc  Starting 12/18/2018, serum creatinine based estimated GFR (eGFR) will be   calculated using the Chronic Kidney Disease Epidemiology Collaboration   (CKD-EPI) equation.      Calcium 8.9 8.5 - 10.1 mg/dL    Bilirubin Total 1.1 0.2 - 1.3 mg/dL    Albumin 3.7 3.4 - 5.0 g/dL    Protein Total 6.3 (L) 6.8 - 8.8 g/dL    Alkaline Phosphatase 108 40 - 150 U/L    ALT 34 0 - 70 U/L    AST 24 0 - 45 U/L   CBC with platelets   Result Value Ref Range    WBC 8.1 4.0 - 11.0 10e9/L    RBC Count 4.78 4.4 - 5.9 10e12/L    Hemoglobin 15.5 13.3 - 17.7 g/dL    Hematocrit 45.4 40.0 - 53.0 %    MCV 95 78 - 100 fl    MCH 32.4 26.5 - 33.0 pg    MCHC 34.1 31.5 - 36.5 g/dL    RDW 12.8 10.0 - 15.0 %    Platelet Count 145 (L) 150 - 450 10e9/L   TSH with free T4 reflex   Result Value Ref Range    TSH 0.70 0.40 -  4.00 mU/L       If you have any questions or concerns, please call the clinic at the number listed above.       Sincerely,        GINA Jerry/kaylio

## 2020-11-25 NOTE — PROGRESS NOTES
Subjective     Markel Padilla is a 80 year old male who presents to clinic today for the following health issues:    HPI         Concern - Itching  Onset: 1 week  No new meds. No breathing issues, throat discomfort. No new meds, pets, skin products, detergents.  Description: it feels like something inside; forearms, ankle, shin  Intensity: moderate  Progression of Symptoms:  Worsening and intermittent.  Accompanying Signs & Symptoms: None  Previous history of similar problem: Yes  Precipitating factors:        Worsened by: when the shirt comes in contact with the skin  Alleviating factors:        Improved by: no  Therapies tried and outcome: Kenalog cream, showering more, showering less    Review of Systems   Constitutional, HEENT, cardiovascular, pulmonary, gi and gu systems are negative, except as otherwise noted.      Objective    BP (!) 176/72   Pulse 73   Temp 97.3  F (36.3  C) (Tympanic)   Resp 16   Wt 80.9 kg (178 lb 6.4 oz)   SpO2 99%   BMI 30.62 kg/m    Body mass index is 30.62 kg/m .  Physical Exam  Vitals signs and nursing note reviewed.   Constitutional:       General: He is not in acute distress.     Appearance: He is not ill-appearing or diaphoretic.   HENT:      Head: Normocephalic and atraumatic.      Mouth/Throat:      Mouth: Mucous membranes are moist.   Eyes:      Conjunctiva/sclera: Conjunctivae normal.   Cardiovascular:      Rate and Rhythm: Normal rate and regular rhythm.      Heart sounds: Normal heart sounds. No murmur. No friction rub. No gallop.    Pulmonary:      Effort: Pulmonary effort is normal. No respiratory distress.      Breath sounds: Normal breath sounds. No stridor. No wheezing, rhonchi or rales.   Skin:     General: Skin is warm and dry.      Comments: No rash aside from 10cm patch of slightly raised erythematous skin to left shin. No drainage, bleeding, fluctuance or warmth.   Neurological:      General: No focal deficit present.      Mental Status: He is alert.  Mental status is at baseline.   Psychiatric:         Mood and Affect: Mood normal.         Behavior: Behavior normal.         comp panel, CBC, and TSH pending.    Assessment & Plan   Problem List Items Addressed This Visit     None      Visit Diagnoses     Pruritic disorder    -  Primary    Relevant Medications    cetirizine (ZYRTEC) 10 MG tablet    triamcinolone (KENALOG) 0.1 % external cream    Other Relevant Orders    Comprehensive metabolic panel (BMP + Alb, Alk Phos, ALT, AST, Total. Bili, TP) (Completed)    DERMATOLOGY ADULT REFERRAL    CBC with platelets (Completed)    TSH with free T4 reflex (Completed)    Rash        Relevant Medications    cetirizine (ZYRTEC) 10 MG tablet    triamcinolone (KENALOG) 0.1 % external cream    Other Relevant Orders    DERMATOLOGY ADULT REFERRAL         It is my impression based on the historical events and the physical exam that this is dermatitis from unknown etiology.  I do not believe the patient has underlying osteomyelitis, septic joint, anaphylaxis, angioedema, abscess, or necrotizing fasciitis. Will treat with cetirizine and triamcinolone and check for systemic cause of pruritus with labs. Dermatology referral in case he is not improving in the next 2-3 weeks.    Complete history and physical exam as above. AF with normal VS except for elevated bp, which he will monitor at home and contact us if >140/90mmHg.    DDx and Dx discussed with and explained to the pt to their satisfaction.  All questions were answered at this time. Pt expressed understanding of and agreement with this dx, tx, and plan. No further workup warranted and standard medication warnings given. I have given the patient a list of pertinent indications for re-evaluation. Will go to the Emergency Department if symptoms worsen or new concerning symptoms arise. Patient left in no apparent distress.     See Patient Instructions  Return if symptoms worsen or fail to improve.    GINA Jerry  [facility]  [facility] [place]

## 2020-11-25 NOTE — PATIENT INSTRUCTIONS
Kenya Jean Baptiste,    Thank you for allowing [facility] to manage your care.    I ordered some blood work, please go to the laboratory to get your laboratory studies.    I sent your prescriptions to your pharmacy.    I made a dermatology referral, they will be calling in approximately 1 week to set up your appointment.  If you do not hear from them, please call:    [facility]  Dermatologist  SCOTTY Chance  Open ? Closes 4:30PM   [phone]    Please allow 1-2 business days for our office to contact you in regards to your laboratory/radiological studies.  If not done so, I encourage you to login into [employer] ([email or web address] to review your results as well.     If you have any questions or concerns, please feel free to call us at [phone]    Sincerely,    Josiah See PA-C    Did you know?      You can schedule a video visit for follow-up appointments as well as future appointments for certain conditions.  Please see the below link.     [email or web address]    If you have not already done so,  I encourage you to sign up for [employer] ([email or web address]).  This will allow you to review your results, securely communicate with a provider, and schedule virtual visits as well.

## 2020-12-29 ENCOUNTER — TELEPHONE (OUTPATIENT)
Dept: FAMILY MEDICINE | Facility: CLINIC | Age: 80
End: 2020-12-29

## 2020-12-29 NOTE — TELEPHONE ENCOUNTER
Patient called today.    Patient is requesting to see Jonathan FUENTES at [facility] tomorrow or Thursday.    Patient has itchy skin general.    Please contact patient.    Thank you.    Central Scheduling  Brittany RITCHIE

## 2020-12-29 NOTE — TELEPHONE ENCOUNTER
Called the patient @ [phone]. I have scheduled the patient to see JODIE Oliver on 12/30/2020 for itchy skin.  Katrina Schreiber TC

## 2020-12-30 ENCOUNTER — OFFICE VISIT (OUTPATIENT)
Dept: FAMILY MEDICINE | Facility: CLINIC | Age: 80
End: 2020-12-30
Payer: COMMERCIAL

## 2020-12-30 VITALS
TEMPERATURE: 99.5 F | RESPIRATION RATE: 16 BRPM | WEIGHT: 168 LBS | OXYGEN SATURATION: 96 % | DIASTOLIC BLOOD PRESSURE: 82 MMHG | SYSTOLIC BLOOD PRESSURE: 142 MMHG | HEART RATE: 70 BPM | BODY MASS INDEX: 28.84 KG/M2

## 2020-12-30 DIAGNOSIS — R60.0 EDEMA LEG: ICD-10-CM

## 2020-12-30 DIAGNOSIS — R06.02 SOB (SHORTNESS OF BREATH) ON EXERTION: ICD-10-CM

## 2020-12-30 DIAGNOSIS — R21 RASH: ICD-10-CM

## 2020-12-30 DIAGNOSIS — I10 HYPERTENSION GOAL BP (BLOOD PRESSURE) < 140/90: Primary | ICD-10-CM

## 2020-12-30 DIAGNOSIS — I51.7 ATRIAL ENLARGEMENT, BILATERAL: ICD-10-CM

## 2020-12-30 DIAGNOSIS — L29.9 PRURITIC DISORDER: ICD-10-CM

## 2020-12-30 DIAGNOSIS — I45.10 RBBB: ICD-10-CM

## 2020-12-30 PROCEDURE — 99214 OFFICE O/P EST MOD 30 MIN: CPT | Performed by: PHYSICIAN ASSISTANT

## 2020-12-30 PROCEDURE — 93000 ELECTROCARDIOGRAM COMPLETE: CPT | Performed by: PHYSICIAN ASSISTANT

## 2020-12-30 RX ORDER — HYDROCHLOROTHIAZIDE 12.5 MG/1
12.5 TABLET ORAL DAILY
Qty: 90 TABLET | Refills: 1 | Status: SHIPPED | OUTPATIENT
Start: 2020-12-30 | End: 2021-02-03 | Stop reason: ALTCHOICE

## 2020-12-30 RX ORDER — TRIAMCINOLONE ACETONIDE 1 MG/G
CREAM TOPICAL 2 TIMES DAILY PRN
Qty: 80 G | Refills: 0 | Status: SHIPPED | OUTPATIENT
Start: 2020-12-30 | End: 2021-02-23

## 2020-12-30 RX ORDER — LISINOPRIL 10 MG/1
10 TABLET ORAL DAILY
Qty: 90 TABLET | Refills: 1 | Status: SHIPPED | OUTPATIENT
Start: 2020-12-30 | End: 2021-05-04

## 2020-12-30 NOTE — PROGRESS NOTES
Subjective     Markel Padilla is a 80 year old male who presents to clinic today for the following health issues:    HPI         Concern - Itchy skin forearms and shins  Onset: 1 month  Description: red  Intensity: mild  Progression of Symptoms:  worsening  Accompanying Signs & Symptoms: none  Previous history of similar problem: was seen on 11/25/20   Precipitating factors:        Worsened by: itching  Alleviating factors:        Improved by: cream has helped a little   Therapies tried and outcome: kenalog cream, zyrtec, kenalog    Of note his blood pressures been elevated last few times he has been in.  He states it is around 140/90 at home.  He denies any chest pain.  He does have some exertional shortness of breath winded type of feeling when he walks with his boots and coat on through the snow.  He states he walks a couple miles on his treadmill at home he does not have any symptoms.  He denies any recent illnesses.      Review of Systems   Constitutional, HEENT, cardiovascular, pulmonary, gi and gu systems are negative, except as otherwise noted.      Objective    BP (!) 142/82   Pulse 70   Temp 99.5  F (37.5  C)   Resp 16   Wt 76.2 kg (168 lb)   SpO2 96%   BMI 28.84 kg/m    Body mass index is 28.84 kg/m .  Physical Exam   GENERAL: healthy, alert and no distress  RESP: lungs clear to auscultation - no rales, rhonchi or wheezes  CV: regular rate and rhythm, normal S1 S2, no S3 or S4, no murmur, click or rub, no peripheral edema and peripheral pulses strong  MS: no gross musculoskeletal defects noted, 1+ pitting edema  SKIN: Faint dry erythematous patches on his bilateral forearms and bilateral shins.      EKG: Atrial enlargement, RBBB. NSR, None to compare.         Assessment & Plan       ICD-10-CM    1. Hypertension goal BP (blood pressure) < 140/90  I10 EKG 12-lead complete w/read - Clinics     hydrochlorothiazide (HYDRODIURIL) 12.5 MG tablet     lisinopril (ZESTRIL) 10 MG tablet   2. SOB (shortness  of breath) on exertion  R06.02 EKG 12-lead complete w/read - Clinics   3. Edema leg  R60.0 hydrochlorothiazide (HYDRODIURIL) 12.5 MG tablet   4. RBBB  I45.10    5. Atrial enlargement, bilateral  I51.7    6. Pruritic disorder  L29.9 triamcinolone (KENALOG) 0.1 % external cream   7. Rash  R21 triamcinolone (KENALOG) 0.1 % external cream   1-5:Talk to patient about his concerns.  We will go ahead and add hydrochlorothiazide 12.5 mg daily to his regimen.  We will see if this helps her shortness of breath and leg edema and help manage his blood pressure.  Warning signs and side effects were discussed.recheck 4 wks.     6-7:Okay for continued daily lotions along with triamcinolone cream as needed.  Warning signs were discussed.  Follow-up as needed.      Return in about 4 weeks (around 1/27/2021) for recheck.    Jonathan Oliver PA-C  [facility]

## 2021-01-27 NOTE — PROGRESS NOTES
"    Maddison Jean Baptiste is a 80 year old who presents to clinic today for the following health issues     HPI       Hyperlipidemia Follow-Up      Are you regularly taking any medication or supplement to lower your cholesterol?   Yes- simvastatin     Are you having muscle aches or other side effects that you think could be caused by your cholesterol lowering medication?  No    Hypertension Follow-up      Do you check your blood pressure regularly outside of the clinic? Yes     Are you following a low salt diet? Yes    Are your blood pressures ever more than 140 on the top number (systolic) OR more   than 90 on the bottom number (diastolic), for example 140/90? Yes    When patient was last in in December 30 he had elevated blood pressure and signs of mild shortness of breath with leg edema.  We started him on hydrochlorothiazide.  He states he has been feeling better.  Denies any chest pain minimal shortness of breath.  He does walk his treadmill for 2 miles a day with no symptoms.  I reviewed his EKG from that visit and briefly talked him about potentially getting an echocardiogram which at this point he defers.    He keeps track of his blood pressure at home and is consistently below 140/90 on average.    Review of Systems   Constitutional, HEENT, cardiovascular, pulmonary, gi and gu systems are negative, except as otherwise noted.      Objective    /82   Pulse 51   Temp 97.7  F (36.5  C) (Oral)   Resp 16   Ht 1.664 m (5' 5.5\")   Wt 81.6 kg (180 lb)   SpO2 98%   BMI 29.50 kg/m    Body mass index is 29.5 kg/m .  Physical Exam   GENERAL: healthy, alert and no distress  RESP: lungs clear to auscultation - no rales, rhonchi or wheezes  CV: regular rate and rhythm, normal S1 S2, no S3 or S4, no murmur, click or rub, no peripheral edema and peripheral pulses strong  ABDOMEN: soft, nontender, no hepatosplenomegaly, no masses and bowel sounds normal  MS: no gross musculoskeletal defects noted, no " edema    Labs pending      ICD-10-CM    1. Hypertension goal BP (blood pressure) < 140/90  I10 Basic metabolic panel     Talk to patient but his concerns at this point labs are pending.  Follow-up with depend on lab results otherwise recheck again in 6 months.

## 2021-01-28 ENCOUNTER — OFFICE VISIT (OUTPATIENT)
Dept: FAMILY MEDICINE | Facility: CLINIC | Age: 81
End: 2021-01-28
Payer: COMMERCIAL

## 2021-01-28 VITALS
HEIGHT: 66 IN | RESPIRATION RATE: 16 BRPM | DIASTOLIC BLOOD PRESSURE: 82 MMHG | SYSTOLIC BLOOD PRESSURE: 138 MMHG | TEMPERATURE: 97.7 F | OXYGEN SATURATION: 98 % | HEART RATE: 51 BPM | BODY MASS INDEX: 28.93 KG/M2 | WEIGHT: 180 LBS

## 2021-01-28 DIAGNOSIS — R60.0 EDEMA LEG: ICD-10-CM

## 2021-01-28 DIAGNOSIS — I10 HYPERTENSION GOAL BP (BLOOD PRESSURE) < 140/90: Primary | ICD-10-CM

## 2021-01-28 LAB
ANION GAP SERPL CALCULATED.3IONS-SCNC: 3 MMOL/L (ref 3–14)
BUN SERPL-MCNC: 8 MG/DL (ref 7–30)
CALCIUM SERPL-MCNC: 9.2 MG/DL (ref 8.5–10.1)
CHLORIDE SERPL-SCNC: 98 MMOL/L (ref 94–109)
CO2 SERPL-SCNC: 30 MMOL/L (ref 20–32)
CREAT SERPL-MCNC: 0.82 MG/DL (ref 0.66–1.25)
GFR SERPL CREATININE-BSD FRML MDRD: 83 ML/MIN/{1.73_M2}
GLUCOSE SERPL-MCNC: 93 MG/DL (ref 70–99)
POTASSIUM SERPL-SCNC: 4.4 MMOL/L (ref 3.4–5.3)
SODIUM SERPL-SCNC: 131 MMOL/L (ref 133–144)

## 2021-01-28 PROCEDURE — 36415 COLL VENOUS BLD VENIPUNCTURE: CPT | Performed by: PHYSICIAN ASSISTANT

## 2021-01-28 PROCEDURE — 99213 OFFICE O/P EST LOW 20 MIN: CPT | Performed by: PHYSICIAN ASSISTANT

## 2021-01-28 PROCEDURE — 80048 BASIC METABOLIC PNL TOTAL CA: CPT | Performed by: PHYSICIAN ASSISTANT

## 2021-01-28 ASSESSMENT — MIFFLIN-ST. JEOR: SCORE: 1461.28

## 2021-01-28 NOTE — NURSING NOTE
"Chief Complaint   Patient presents with     Lipids     Hypertension       Initial BP (!) 197/81   Pulse 51   Temp 97.7  F (36.5  C) (Oral)   Resp 16   Ht 1.664 m (5' 5.5\")   Wt 81.6 kg (180 lb)   SpO2 98%   BMI 29.50 kg/m   Estimated body mass index is 29.5 kg/m  as calculated from the following:    Height as of this encounter: 1.664 m (5' 5.5\").    Weight as of this encounter: 81.6 kg (180 lb).  Medication Reconciliation: complete  Edwar Bautista CMA    "

## 2021-02-03 ENCOUNTER — TELEPHONE (OUTPATIENT)
Dept: FAMILY MEDICINE | Facility: CLINIC | Age: 81
End: 2021-02-03

## 2021-02-03 DIAGNOSIS — I10 HYPERTENSION GOAL BP (BLOOD PRESSURE) < 140/90: ICD-10-CM

## 2021-02-03 DIAGNOSIS — R60.0 EDEMA LEG: ICD-10-CM

## 2021-02-03 LAB — SODIUM SERPL-SCNC: 130 MMOL/L (ref 133–144)

## 2021-02-03 PROCEDURE — 36415 COLL VENOUS BLD VENIPUNCTURE: CPT | Performed by: PHYSICIAN ASSISTANT

## 2021-02-03 PROCEDURE — 84295 ASSAY OF SERUM SODIUM: CPT | Performed by: PHYSICIAN ASSISTANT

## 2021-02-03 RX ORDER — FUROSEMIDE 20 MG
TABLET ORAL
Qty: 90 TABLET | Refills: 0 | Status: SHIPPED | OUTPATIENT
Start: 2021-02-03 | End: 2021-04-20 | Stop reason: SINTOL

## 2021-02-03 RX ORDER — FUROSEMIDE 20 MG
20 TABLET ORAL DAILY
Qty: 30 TABLET | Refills: 0 | Status: SHIPPED | OUTPATIENT
Start: 2021-02-03 | End: 2021-02-03

## 2021-02-03 NOTE — TELEPHONE ENCOUNTER
Prescription approved per [facility] Refill Protocol.        Luis Zuniga RN, BSN, PHN

## 2021-02-03 NOTE — TELEPHONE ENCOUNTER
Left message on answering machine for patient to call back.  [phone]  Ai MUNOZN, RN, CPN        please call patient with abnormal results.   Sodium probably dropping due to new blood pressure med hydrochlorothiazide.   Recommend stopping it and starting Lasix. A Prescription was sent to the pharmacy.   Recheck labs and blood pressure in 2 wks with ancillary.     Jonathan Oliver PA-C

## 2021-02-03 NOTE — TELEPHONE ENCOUNTER
Patient called back and was given message. Transferred patient to schedule lab appointment.       Luis Zuniga RN, BSN, PHN

## 2021-02-17 DIAGNOSIS — R60.0 EDEMA LEG: ICD-10-CM

## 2021-02-17 DIAGNOSIS — I10 HYPERTENSION GOAL BP (BLOOD PRESSURE) < 140/90: ICD-10-CM

## 2021-02-17 LAB
ANION GAP SERPL CALCULATED.3IONS-SCNC: 5 MMOL/L (ref 3–14)
BUN SERPL-MCNC: 12 MG/DL (ref 7–30)
CALCIUM SERPL-MCNC: 9.1 MG/DL (ref 8.5–10.1)
CHLORIDE SERPL-SCNC: 105 MMOL/L (ref 94–109)
CO2 SERPL-SCNC: 26 MMOL/L (ref 20–32)
CREAT SERPL-MCNC: 0.99 MG/DL (ref 0.66–1.25)
GFR SERPL CREATININE-BSD FRML MDRD: 71 ML/MIN/{1.73_M2}
GLUCOSE SERPL-MCNC: 119 MG/DL (ref 70–99)
POTASSIUM SERPL-SCNC: 4.2 MMOL/L (ref 3.4–5.3)
SODIUM SERPL-SCNC: 136 MMOL/L (ref 133–144)

## 2021-02-17 PROCEDURE — 80048 BASIC METABOLIC PNL TOTAL CA: CPT | Performed by: PHYSICIAN ASSISTANT

## 2021-02-17 PROCEDURE — 36415 COLL VENOUS BLD VENIPUNCTURE: CPT | Performed by: PHYSICIAN ASSISTANT

## 2021-02-17 NOTE — LETTER
February 18, 2021      Markel Padilla  [address] [facility]  [place] [address]        Mr. Padilla,     All of your labs were normal/near normal for you.   Lets recheck you blood pressure again and labs in about 6 months.     Please contact the clinic if you have additional questions.  Thank you.     Sincerely,     Jonathan Oliver PA-C     Resulted Orders   **Basic metabolic panel FUTURE anytime   Result Value Ref Range    Sodium 136 133 - 144 mmol/L    Potassium 4.2 3.4 - 5.3 mmol/L    Chloride 105 94 - 109 mmol/L    Carbon Dioxide 26 20 - 32 mmol/L    Anion Gap 5 3 - 14 mmol/L    Glucose 119 (H) 70 - 99 mg/dL      Comment:      Non Fasting    Urea Nitrogen 12 7 - 30 mg/dL    Creatinine 0.99 0.66 - 1.25 mg/dL    GFR Estimate 71 >60 mL/min/[1.73_m2]      Comment:      Non  GFR Calc  Starting 12/18/2018, serum creatinine based estimated GFR (eGFR) will be   calculated using the Chronic Kidney Disease Epidemiology Collaboration   (CKD-EPI) equation.      GFR Estimate If Black 83 >60 mL/min/[1.73_m2]      Comment:       GFR Calc  Starting 12/18/2018, serum creatinine based estimated GFR (eGFR) will be   calculated using the Chronic Kidney Disease Epidemiology Collaboration   (CKD-EPI) equation.      Calcium 9.1 8.5 - 10.1 mg/dL

## 2021-02-18 NOTE — RESULT ENCOUNTER NOTE
Mr. Padilla,    All of your labs were normal/near normal for you.  Lets recheck you blood pressure again and labs in about 6 months.     Please contact the clinic if you have additional questions.  Thank you.    Sincerely,    Jonathan Oliver PA-C

## 2021-02-19 ENCOUNTER — TELEPHONE (OUTPATIENT)
Dept: FAMILY MEDICINE | Facility: CLINIC | Age: 81
End: 2021-02-19

## 2021-02-19 DIAGNOSIS — R21 RASH: Primary | ICD-10-CM

## 2021-02-19 NOTE — TELEPHONE ENCOUNTER
Patient started on furosemide for 2 weeks. Patient c/o itching and red raised rash onset after starting med.  Rash is on arms and lower legs    To provider to advise    Ai MUNOZN, RN, CPN

## 2021-02-19 NOTE — TELEPHONE ENCOUNTER
Patient is on furosemide. He is having itching and rash form this. He also wants to know what the labs showed form the other day on his sodium.Deanna Rushing MA/TC

## 2021-02-19 NOTE — TELEPHONE ENCOUNTER
Rash couple possible be from new med. Is it bothering him enough that he wants to stop medication? otherwise try lotion twice a day and recheck with me in the clinic in 2 wks.     Jonathan Oliver PA-C

## 2021-02-19 NOTE — TELEPHONE ENCOUNTER
I faxed the patients Dermatology referral to [facility] @ fax #[phone].  Soo Orourke,

## 2021-02-19 NOTE — TELEPHONE ENCOUNTER
RN returned call to pt and relayed provider message below.  Pt states the rash has been present prior to starting furosemide. He says he prefers not to stop the furosemide if his labs are looking good now.  Pt states rash has been intermittent since November and relatively unchanged.   He states he applies triamcinolone as ordered at 11/25/20 office visit as needed, and then switches to Gold Bond lotion as it improves.    Pt requesting Dermatology Referral to [facility] near his home at [address] [place] [address] [place], [address], phone [phone].   He states he verified it is covered by his insurance.  Please review and sign pending order.    RN encouraged pt to call back to speak with triage nurse if he has new or worsening sx prior to the Dermatology evaluation he is requesting.    TAMMY BrewerN, RN

## 2021-02-19 NOTE — TELEPHONE ENCOUNTER
Patient called and wants to change referral order to [facility], [address]    Patient # [phone] can leave message  Nikole Shepherd [facility]  2nd Floor  Primary Care

## 2021-02-22 DIAGNOSIS — R21 RASH: ICD-10-CM

## 2021-02-22 DIAGNOSIS — L29.9 PRURITIC DISORDER: ICD-10-CM

## 2021-02-23 ENCOUNTER — TRANSFERRED RECORDS (OUTPATIENT)
Dept: HEALTH INFORMATION MANAGEMENT | Facility: CLINIC | Age: 81
End: 2021-02-23
Payer: COMMERCIAL

## 2021-02-23 RX ORDER — TRIAMCINOLONE ACETONIDE 1 MG/G
CREAM TOPICAL
Qty: 80 G | Refills: 0 | Status: SHIPPED | OUTPATIENT
Start: 2021-02-23 | End: 2022-02-16

## 2021-03-09 ENCOUNTER — IMMUNIZATION (OUTPATIENT)
Dept: NURSING | Facility: CLINIC | Age: 81
End: 2021-03-09
Payer: COMMERCIAL

## 2021-03-09 PROCEDURE — 91300 PR COVID VAC PFIZER DIL RECON 30 MCG/0.3 ML IM: CPT

## 2021-03-09 PROCEDURE — 0001A PR COVID VAC PFIZER DIL RECON 30 MCG/0.3 ML IM: CPT

## 2021-03-30 ENCOUNTER — IMMUNIZATION (OUTPATIENT)
Dept: NURSING | Facility: CLINIC | Age: 81
End: 2021-03-30
Attending: FAMILY MEDICINE
Payer: COMMERCIAL

## 2021-03-30 PROCEDURE — 0002A PR COVID VAC PFIZER DIL RECON 30 MCG/0.3 ML IM: CPT

## 2021-03-30 PROCEDURE — 91300 PR COVID VAC PFIZER DIL RECON 30 MCG/0.3 ML IM: CPT

## 2021-04-09 DIAGNOSIS — E78.5 HYPERLIPIDEMIA LDL GOAL <130: ICD-10-CM

## 2021-04-09 DIAGNOSIS — K21.9 GASTROESOPHAGEAL REFLUX DISEASE: ICD-10-CM

## 2021-04-12 RX ORDER — SIMVASTATIN 40 MG
TABLET ORAL
Qty: 90 TABLET | Refills: 1 | Status: SHIPPED | OUTPATIENT
Start: 2021-04-12 | End: 2021-05-04

## 2021-04-12 NOTE — TELEPHONE ENCOUNTER
Routing refill request to provider for review/approval because:  Labs not current:    LDL Cholesterol Calculated   Date Value Ref Range Status   02/12/2020 71 <100 mg/dL Final     Comment:     Desirable:       <100 mg/dl         Ai MUNOZN, RN

## 2021-04-20 ENCOUNTER — OFFICE VISIT (OUTPATIENT)
Dept: FAMILY MEDICINE | Facility: CLINIC | Age: 81
End: 2021-04-20
Payer: COMMERCIAL

## 2021-04-20 VITALS
HEART RATE: 76 BPM | OXYGEN SATURATION: 98 % | SYSTOLIC BLOOD PRESSURE: 138 MMHG | BODY MASS INDEX: 29.17 KG/M2 | DIASTOLIC BLOOD PRESSURE: 72 MMHG | WEIGHT: 178 LBS | RESPIRATION RATE: 18 BRPM

## 2021-04-20 DIAGNOSIS — I10 HYPERTENSION GOAL BP (BLOOD PRESSURE) < 140/90: Primary | ICD-10-CM

## 2021-04-20 DIAGNOSIS — I10 HYPERTENSION GOAL BP (BLOOD PRESSURE) < 140/90: ICD-10-CM

## 2021-04-20 PROCEDURE — 99213 OFFICE O/P EST LOW 20 MIN: CPT | Performed by: PHYSICIAN ASSISTANT

## 2021-04-20 RX ORDER — AMLODIPINE BESYLATE 2.5 MG/1
2.5 TABLET ORAL DAILY
Qty: 30 TABLET | Refills: 0 | Status: SHIPPED | OUTPATIENT
Start: 2021-04-20 | End: 2021-05-04

## 2021-04-20 RX ORDER — AMLODIPINE BESYLATE 2.5 MG/1
TABLET ORAL
Qty: 90 TABLET | OUTPATIENT
Start: 2021-04-20

## 2021-04-20 NOTE — PROGRESS NOTES
Assessment & Plan       ICD-10-CM    1. Hypertension goal BP (blood pressure) < 140/90  I10 amLODIPine (NORVASC) 2.5 MG tablet   will stop lasix and start amlodipine. warning signs discussed. side effects discussed  Recheck blood pressure in 2 wks.     Return in about 2 weeks (around 5/4/2021) for BP Recheck.    PRUDENCE Nash [facility]    Maddison Jean Baptiste is a 80 year old who presents for the following health issues     HPI     Hyperlipidemia Follow-Up      Are you regularly taking any medication or supplement to lower your cholesterol?   Yes- simvastatin     Are you having muscle aches or other side effects that you think could be caused by your cholesterol lowering medication?  No    Hypertension Follow-up      Do you check your blood pressure regularly outside of the clinic? Yes     Are you following a low salt diet? No    Are your blood pressures ever more than 140 on the top number (systolic) OR more   than 90 on the bottom number (diastolic), for example 140/90? At times    Discuss furosemide - thinks this is causing itchiness    We had put him on furosemide to help his leg edema which it has but he cannot tolerate the body itches.    Review of Systems   Constitutional, HEENT, cardiovascular, pulmonary, gi and gu systems are negative, except as otherwise noted.      Objective    /72   Pulse 76   Resp 18   Wt 80.7 kg (178 lb)   SpO2 98%   BMI 29.17 kg/m    Body mass index is 29.17 kg/m .  Physical Exam   GENERAL: healthy, alert and no distress  RESP: lungs clear to auscultation - no rales, rhonchi or wheezes  CV: regular rate and rhythm, normal S1 S2, no S3 or S4, no murmur, click or rub, no peripheral edema and peripheral pulses strong  ABDOMEN: soft, nontender, no hepatosplenomegaly, no masses and bowel sounds normal  MS: no gross musculoskeletal defects noted, no edema

## 2021-05-04 ENCOUNTER — OFFICE VISIT (OUTPATIENT)
Dept: FAMILY MEDICINE | Facility: CLINIC | Age: 81
End: 2021-05-04
Payer: COMMERCIAL

## 2021-05-04 VITALS
BODY MASS INDEX: 28.84 KG/M2 | SYSTOLIC BLOOD PRESSURE: 148 MMHG | WEIGHT: 176 LBS | OXYGEN SATURATION: 97 % | DIASTOLIC BLOOD PRESSURE: 76 MMHG | RESPIRATION RATE: 18 BRPM | HEART RATE: 70 BPM

## 2021-05-04 DIAGNOSIS — Z23 NEED FOR VACCINATION: ICD-10-CM

## 2021-05-04 DIAGNOSIS — E78.5 HYPERLIPIDEMIA LDL GOAL <130: ICD-10-CM

## 2021-05-04 DIAGNOSIS — I10 HYPERTENSION GOAL BP (BLOOD PRESSURE) < 140/90: Primary | ICD-10-CM

## 2021-05-04 PROCEDURE — 90715 TDAP VACCINE 7 YRS/> IM: CPT | Performed by: PHYSICIAN ASSISTANT

## 2021-05-04 PROCEDURE — 90471 IMMUNIZATION ADMIN: CPT | Performed by: PHYSICIAN ASSISTANT

## 2021-05-04 PROCEDURE — 99214 OFFICE O/P EST MOD 30 MIN: CPT | Mod: 25 | Performed by: PHYSICIAN ASSISTANT

## 2021-05-04 RX ORDER — SIMVASTATIN 40 MG
40 TABLET ORAL AT BEDTIME
Qty: 90 TABLET | Refills: 1 | Status: SHIPPED | OUTPATIENT
Start: 2021-05-04 | End: 2021-10-21

## 2021-05-04 RX ORDER — AMLODIPINE BESYLATE 2.5 MG/1
2.5 TABLET ORAL DAILY
Qty: 90 TABLET | Refills: 1 | Status: SHIPPED | OUTPATIENT
Start: 2021-05-04 | End: 2021-10-21

## 2021-05-04 RX ORDER — LISINOPRIL 10 MG/1
10 TABLET ORAL DAILY
Qty: 90 TABLET | Refills: 1 | Status: SHIPPED | OUTPATIENT
Start: 2021-05-04 | End: 2021-10-21

## 2021-05-04 NOTE — NURSING NOTE
Prior to immunization administration, verified patients identity using patient s name and date of birth. Please see Immunization Activity for additional information.     Screening Questionnaire for Adult Immunization    Are you sick today?   No   Do you have allergies to medications, food, a vaccine component or latex?   No   Have you ever had a serious reaction after receiving a vaccination?   No   Do you have a long-term health problem with heart, lung, kidney, or metabolic disease (e.g., diabetes), asthma, a blood disorder, no spleen, complement component deficiency, a cochlear implant, or a spinal fluid leak?  Are you on long-term aspirin therapy?   No   Do you have cancer, leukemia, HIV/AIDS, or any other immune system problem?   No   Do you have a parent, brother, or sister with an immune system problem?   No   In the past 3 months, have you taken medications that affect  your immune system, such as prednisone, other steroids, or anticancer drugs; drugs for the treatment of rheumatoid arthritis, Crohn s disease, or psoriasis; or have you had radiation treatments?   No   Have you had a seizure, or a brain or other nervous system problem?   No   During the past year, have you received a transfusion of blood or blood    products, or been given immune (gamma) globulin or antiviral drug?   No   For women: Are you pregnant or is there a chance you could become       pregnant during the next month?   No   Have you received any vaccinations in the past 4 weeks?   No     Immunization questionnaire answers were all negative.        Per orders of Jonathan Oliver, injection of tdap given by Florina Kiran CMA. Patient instructed to remain in clinic for 15 minutes afterwards, and to report any adverse reaction to me immediately.       Screening performed by Florina Kiran CMA on 5/4/2021 at 8:45 AM.

## 2021-05-04 NOTE — PROGRESS NOTES
Assessment & Plan       ICD-10-CM    1. Hypertension goal BP (blood pressure) < 140/90  I10 amLODIPine (NORVASC) 2.5 MG tablet     lisinopril (ZESTRIL) 10 MG tablet   2. Hyperlipidemia LDL goal <130  E78.5 simvastatin (ZOCOR) 40 MG tablet   3. Need for vaccination  Z23 TDAP VACCINE (Adacel, Boostrix)  [[id number]]     medical conditions are stable. meds refilled.  Recheck 6 months.   warning signs discussed.       Return in about 6 months (around 11/4/2021) for recheck.    [facility]    Maddison Jean Baptiste is a 80 year old who presents for the following health issues     HPI     Hypertension Follow-up      Do you check your blood pressure regularly outside of the clinic? Yes     Are you following a low salt diet? No    Are your blood pressures ever more than 140 on the top number (systolic) OR more   than 90 on the bottom number (diastolic), for example 140/90? Yes  Blood pressure readings at home are below 140/90.     Review of Systems   Constitutional, HEENT, cardiovascular, pulmonary, gi and gu systems are negative, except as otherwise noted.      Objective    BP (!) 148/76   Pulse 70   Resp 18   Wt 79.8 kg (176 lb)   SpO2 97%   BMI 28.84 kg/m    Body mass index is 28.84 kg/m .  Physical Exam   GENERAL: healthy, alert and no distress  RESP: lungs clear to auscultation - no rales, rhonchi or wheezes  CV: regular rate and rhythm, normal S1 S2, no S3 or S4, no murmur, click or rub, no peripheral edema and peripheral pulses strong  ABDOMEN: soft, nontender, no hepatosplenomegaly, no masses and bowel sounds normal  MS: no gross musculoskeletal defects noted, no edema

## 2021-07-06 ENCOUNTER — NURSE TRIAGE (OUTPATIENT)
Dept: FAMILY MEDICINE | Facility: CLINIC | Age: 81
End: 2021-07-06

## 2021-07-06 NOTE — TELEPHONE ENCOUNTER
Patient declined to go to   He only wants to see PCP   Appointment scheduled for next available with PCP 7/8/2021  Advised patient to call back with any changes or worsening.  Patient verbalized understanding.      Reason for Disposition    SEVERE pain (e.g., excruciating, unable to do any normal activities)    Additional Information    Negative: Shock suspected (e.g., cold/pale/clammy skin, too weak to stand, low BP, rapid pulse)    Negative: Similar pain previously and it was from 'heart attack'    Negative: Similar pain previously from 'angina' and not relieved by nitroglycerin    Negative: Sounds like a life-threatening emergency to the triager    Negative: Followed an injury to arm    Negative: Chest pain    Negative: Wound looks infected    Negative: Elbow pain is the main symptom    Negative: Hand or wrist pain is the main symptom    Negative: Fever and red area (or area very tender to touch)    Negative: Fever and swollen joint    Negative: Entire arm is swollen    Negative: Patient sounds very sick or weak to the triager    Negative: Shock suspected (e.g., cold/pale/clammy skin, too weak to stand, low BP, rapid pulse)    Negative: Similar pain previously and it was from 'heart attack'    Negative: Similar pain previously and it was from 'angina' and not relieved by nitroglycerin    Negative: Sounds like a life-threatening emergency to the triager    Negative: Chest pain    Negative: Followed an injury to shoulder    Negative: Difficulty breathing or unusual sweating (e.g., sweating without exertion)    Negative: Pain lasting > 5 minutes and pain also present in chest (Exception: pain is clearly made worse by movement)    Negative: Age > 40 and no obvious cause and pain even when not moving the arm (Exception: pain is clearly made worse by moving arm or bending neck)    Negative: Red area or streak and fever    Negative: Swollen joint and fever    Negative: Entire arm is swollen    Negative: Patient sounds  "very sick or weak to the triager    Answer Assessment - Initial Assessment Questions  1. ONSET: \"When did the pain start?\"      2-3 days ago  2. LOCATION: \"Where is the pain located?\"      Upper right arm and shoulder blade  3. PAIN: \"How bad is the pain?\" (Scale 1-10; or mild, moderate, severe)    - MILD (1-3): doesn't interfere with normal activities    - MODERATE (4-7): interferes with normal activities (e.g., work or school) or awakens from sleep    - SEVERE (8-10): excruciating pain, unable to do any normal activities, unable to hold a cup of water      Moderate to severe  4. WORK OR EXERCISE: \"Has there been any recent work or exercise that involved this part of the body?\"      Denies   5. CAUSE: \"What do you think is causing the arm pain?\"      Shingles?  6. OTHER SYMPTOMS: \"Do you have any other symptoms?\" (e.g., neck pain, swelling, rash, fever, numbness, weakness)      Denies   7. PREGNANCY: \"Is there any chance you are pregnant?\" \"When was your last menstrual period?\"      n/a    Answer Assessment - Initial Assessment Questions  1. ONSET: \"When did the pain start?\"      2-3 days ago  2. LOCATION: \"Where is the pain located?\"      Right upper arm and shoulder blade pain  3. PAIN: \"How bad is the pain?\" (Scale 1-10; or mild, moderate, severe)    - MILD (1-3): doesn't interfere with normal activities    - MODERATE (4-7): interferes with normal activities (e.g., work or school) or awakens from sleep    - SEVERE (8-10): excruciating pain, unable to do any normal activities, unable to move arm at all due to pain      Moderate-severe, patient able to move the arm but does not want to move it due to pain worsens with movement  4. WORK OR EXERCISE: \"Has there been any recent work or exercise that involved this part of the body?\"      No   5. CAUSE: \"What do you think is causing the shoulder pain?\"      Shingles?  6. OTHER SYMPTOMS: \"Do you have any other symptoms?\" (e.g., neck pain, swelling, rash, fever, " "numbness, weakness)      No   7. PREGNANCY: \"Is there any chance you are pregnant?\" \"When was your last menstrual period?\"      n/a    Protocols used: SHOULDER PAIN-A-OH, ARM PAIN-A-OH      Rosalba Lerma RN  [facility]    "

## 2021-07-07 NOTE — PROGRESS NOTES
"SUBJECTIVE:   Markel Padilla is a 80 year old male seen here today for his right upper and and shoulder blade   What happened: no injury       Onset: about 5 days ago     Description:   Character: Sharp    Intensity: severe    Progression of Symptoms: intermittent    Accompanying Signs & Symptoms:  Other symptoms:    History:   Previous similar pain: YES      Precipitating factors:   Trauma or overuse: no     Alleviating factors:  Improved by: nothing       Therapies Tried and outcome:     Is wondering if this is shingles -pt feels pain may be from shingles- had similar symptoms in the past.   had 2 episodes in the past. Last couple minutes. Tingling symptoms. No pain.   No neck pain.   No weakness    ROS:  Constitutional, eye, ENT, skin, respiratory, cardiac, and GI are normal except as otherwise noted.    PFSH:  Past Medical, social, family histories, medications, and allergies were reviewed and updated.     OBJECTIVE:  /82   Pulse 75   Temp 97.8  F (36.6  C) (Tympanic)   Resp 18   Ht 1.664 m (5' 5.5\")   Wt 77.6 kg (171 lb)   SpO2 99%   BMI 28.02 kg/m    General:  Markel is awake, alert, and cooperative.  NAD.  Posturing he has kyphosis.  He has generalized decreased range of motion of his C-spine probably due to age.  It does not reproduce any paresthesias.  He has 5-5 strength of his shoulders bilaterally.  He has negative Tinel's and carpal tunnel Tinel's sign.  His range of motion is equal bilaterally.  Skin is free of rashes in that area.  ASSESSMENT:     ICD-10-CM    1. Paresthesia  R20.2        PLAN:   Talk to patient about his concerns at this point I do not believe this is shingles.  It is possible that he is getting episodes of paresthesias from his cervical spine.  At this point I would not pursue any investigation as it is only happen a couple times in for such a short period of time.  We talked about if his symptoms progress or worsen we can look into this more.  We will treat this " conservatively fully for now.  ice or cold packs 20 minutes every 2-3 hrs as needed to relieve pain and swelling, for the first 2 days. Then can apply heat 20 minutes every 2-3 hrs (avoid sleeping on heating pad) there after as needed.   If you can tolerate, start non-steroidal anti-inflammatory medication like: Ibuprofen 600-800 mg three times daily or Aleve 2 tablets of over the counter strength twice a day as needed.   Tylenol can help with pain also.    Active range of motion exercises encouraged  Activity modification trying to avoid activities that cause you pain.   Follow up 4 wks as needed     Jonathan Oliver PA-C

## 2021-07-08 ENCOUNTER — OFFICE VISIT (OUTPATIENT)
Dept: FAMILY MEDICINE | Facility: CLINIC | Age: 81
End: 2021-07-08
Payer: COMMERCIAL

## 2021-07-08 VITALS
BODY MASS INDEX: 27.48 KG/M2 | DIASTOLIC BLOOD PRESSURE: 82 MMHG | OXYGEN SATURATION: 99 % | HEIGHT: 66 IN | TEMPERATURE: 97.8 F | HEART RATE: 75 BPM | WEIGHT: 171 LBS | SYSTOLIC BLOOD PRESSURE: 137 MMHG | RESPIRATION RATE: 18 BRPM

## 2021-07-08 DIAGNOSIS — R20.2 PARESTHESIA: Primary | ICD-10-CM

## 2021-07-08 PROCEDURE — 99213 OFFICE O/P EST LOW 20 MIN: CPT | Performed by: PHYSICIAN ASSISTANT

## 2021-07-08 ASSESSMENT — MIFFLIN-ST. JEOR: SCORE: 1420.46

## 2021-08-24 ENCOUNTER — TELEPHONE (OUTPATIENT)
Dept: FAMILY MEDICINE | Facility: CLINIC | Age: 81
End: 2021-08-24

## 2021-08-24 ENCOUNTER — ANCILLARY PROCEDURE (OUTPATIENT)
Dept: GENERAL RADIOLOGY | Facility: CLINIC | Age: 81
End: 2021-08-24
Attending: INTERNAL MEDICINE
Payer: COMMERCIAL

## 2021-08-24 ENCOUNTER — OFFICE VISIT (OUTPATIENT)
Dept: URGENT CARE | Facility: URGENT CARE | Age: 81
End: 2021-08-24
Payer: COMMERCIAL

## 2021-08-24 VITALS
WEIGHT: 172.6 LBS | OXYGEN SATURATION: 99 % | DIASTOLIC BLOOD PRESSURE: 60 MMHG | BODY MASS INDEX: 28.29 KG/M2 | HEART RATE: 69 BPM | SYSTOLIC BLOOD PRESSURE: 133 MMHG

## 2021-08-24 DIAGNOSIS — S49.92XA INJURY OF LEFT SHOULDER, INITIAL ENCOUNTER: Primary | ICD-10-CM

## 2021-08-24 DIAGNOSIS — Z91.81 PERSONAL HISTORY OF FALL: ICD-10-CM

## 2021-08-24 DIAGNOSIS — M25.512 ACUTE PAIN OF LEFT SHOULDER: Primary | ICD-10-CM

## 2021-08-24 DIAGNOSIS — M25.512 ACUTE PAIN OF LEFT SHOULDER: ICD-10-CM

## 2021-08-24 PROCEDURE — 99214 OFFICE O/P EST MOD 30 MIN: CPT | Performed by: INTERNAL MEDICINE

## 2021-08-24 PROCEDURE — 73030 X-RAY EXAM OF SHOULDER: CPT | Mod: LT | Performed by: RADIOLOGY

## 2021-08-24 NOTE — PROGRESS NOTES
SUBJECTIVE:  Markel Padilla is an 81 year old male who presents for left shoulder pain.  Fell going up his stairs carrying groceries.  Fell five days ago.  Iced the shoulder.  Has put on icy hot which helps some.  Pt feels the pain is improving but still some pain.  Hurts more if lies on that side. Today his wife noticed bruising around shoulder and thought he should come in.  No past injuries or surgeries.  Is right handed.  Can move shoulder around okay but sometimes gets a twinge of pain. Not injure anything else when fell.  No lightheadedness, dizziness, cp or shortness of breath before or after fall. Didn't hit head when fell.  Is on asa 81 mg  Daily.      PMH:   has a past medical history of Ehrlichiosis, Hemorrhage gastric, Hypercholesteremia, Hypertension, and MEDICAL HISTORY OF -.  Patient Active Problem List   Diagnosis     Erectile dysfunction     Hypertension goal BP (blood pressure) < 140/90     High cholesterol     Overweight (BMI 25.0-29.9)     Advanced directives, counseling/discussion     Hearing loss     Sleep disturbance     Eczema     Gastroesophageal reflux disease, esophagitis presence not specified     Hearing loss, bilateral     Raynaud's disease without gangrene     Trigger finger, acquired     Edema leg     Rash     RBBB     Atrial enlargement, bilateral     Social History     Socioeconomic History     Marital status:      Spouse name: None     Number of children: None     Years of education: None     Highest education level: None   Occupational History     None   Tobacco Use     Smoking status: Former Smoker     Types: Cigarettes     Quit date: 1979     Years since quittin.8     Smokeless tobacco: Current User     Types: Chew     Last attempt to quit: 1989   Substance and Sexual Activity     Alcohol use: Yes     Comment: rare     Drug use: No     Sexual activity: Yes     Partners: Female   Other Topics Concern     Parent/sibling w/ CABG, MI or angioplasty before  65F 55M? Not Asked   Social History Narrative     None     Social Determinants of Health     Financial Resource Strain:      Difficulty of Paying Living Expenses:    Food Insecurity:      Worried About Running Out of Food in the Last Year:      Ran Out of Food in the Last Year:    Transportation Needs:      Lack of Transportation (Medical):      Lack of Transportation (Non-Medical):    Physical Activity:      Days of Exercise per Week:      Minutes of Exercise per Session:    Stress:      Feeling of Stress :    Social Connections:      Frequency of Communication with Friends and Family:      Frequency of Social Gatherings with Friends and Family:      Attends [employer] Services:      Active Member of Clubs or Organizations:      Attends Club or Organization Meetings:      Marital Status:    Intimate Partner Violence:      Fear of Current or Ex-Partner:      Emotionally Abused:      Physically Abused:      Sexually Abused:      Family History   Problem Relation Age of Onset     Heart Disease Mother      Lipids Mother      C.A.D. Father         age 65     Respiratory Sister         COPD     Hypertension Son      Hypertension Son        ALLERGIES:  Nkda [no known drug allergies]    Current Outpatient Medications   Medication     amLODIPine (NORVASC) 2.5 MG tablet     aspirin 81 MG tablet     calcium carbonate (OS-FREDIS 500 MG [place]) 500 MG tablet     ferrous sulfate 325 (65 FE) MG tablet     FISH OIL 1000 MG OR CAPS     GLUCOSAMINE CHONDR 1500 COMPLX OR     lisinopril (ZESTRIL) 10 MG tablet     MULTI-VITAMIN OR TABS     omeprazole (PRILOSEC) 20 MG DR capsule     simvastatin (ZOCOR) 40 MG tablet     triamcinolone (KENALOG) 0.1 % external cream     triamcinolone (KENALOG) 0.5 % cream     No current facility-administered medications for this visit.         ROS:  ROS is done and is negative for general/constitutional, eye, ENT, Respiratory, cardiovascular, GI, , Skin, musculoskeletal except as noted elsewhere.  All  other review of systems negative except as noted elsewhere.      OBJECTIVE:  /60   Pulse 69   Wt 78.3 kg (172 lb 9.6 oz)   SpO2 99%   BMI 28.29 kg/m    GENERAL APPEARANCE: Alert, in no acute distress  EYES: normal  NOSE:normal  OROPHARYNX:normal  NECK:No adenopathy,masses or thyromegaly  RESP: normal and clear to auscultation  CV:regular rate and rhythm and no murmurs, clicks, or gallops  ABDOMEN: Abdomen soft, non-tender. BS normal. No masses, organomegaly  SKIN: left upper lateral chest with yellowish bruising.  MUSCULOSKELETAL: left shoulder - mild tenderness along anterior joint line, no edema, normal rom with minimal pain, no dislocation      RESULTS  Xrays left shoulder - no fracture or dislocation noted on my reading.  Await radiology reading.    ASSESSMENT/PLAN:    ASSESSMENT / PLAN:  (M25.512) Acute pain of left shoulder  (primary encounter diagnosis)  Comment: after fall.  Currently most c/w soft tissue injury.  As has mild pain along anterior joint line, consider rotator cuff injury, but as rom is good and sxs improving, will follow  Plan: XR Shoulder Left        I reviewed supportive care including icing and rest of the area, otc meds to use if needed, expected course, and signs of concern.  Follow up with ortho as needed or if he does not improve within 10 day(s) or if worsens in any way.  Reviewed red flag symptoms and is to go to the ER if experiences any of these.    (Z91.81) Personal history of fall  Comment: pt reports he feels at more risk of a fall and less stable than he used to.  Gradually worsened.  Will refer to PT for work on fall risk  Plan: [facility]erral,  XR Shoulder        Left        I reviewed supportive care, otc meds to use if needed, expected course, and signs of concern.  Follow up with physical therapy to work on balance and strengthening to reduce fall risk.  Reviewed red flag symptoms and is to go to the ER if experiences any of these.      PPE worn: mask  and shield.  Addendum: reviewed radiology's reading of xray which raises question of possible distal clavicle fracture  Pt contacted about results and referred to ortho to be seen tomorrow for further evaluation.  See [facility] for orders, medications, letters, patient instructions    Elizabeth Jamil M.D.

## 2021-08-24 NOTE — TELEPHONE ENCOUNTER
Patient fell last Friday, carrying groceries upstairs and missed step going upstairs, fell forward, landed on left shoulder  Has been icing, today hot shower felt good, but has a lot of bruising on shoulder, no swelling.   Not painful to move arm around  Patient concerned about discoloration of shoulder    Advised patient needs to be seen, patient to go to urgent care     Ai Salomon BSN, RN

## 2021-08-25 ENCOUNTER — OFFICE VISIT (OUTPATIENT)
Dept: ORTHOPEDICS | Facility: CLINIC | Age: 81
End: 2021-08-25
Payer: COMMERCIAL

## 2021-08-25 VITALS — OXYGEN SATURATION: 98 % | HEART RATE: 64 BPM | DIASTOLIC BLOOD PRESSURE: 64 MMHG | SYSTOLIC BLOOD PRESSURE: 136 MMHG

## 2021-08-25 DIAGNOSIS — S49.92XA INJURY OF LEFT SHOULDER, INITIAL ENCOUNTER: ICD-10-CM

## 2021-08-25 DIAGNOSIS — S49.92XA SHOULDER INJURY, LEFT, INITIAL ENCOUNTER: Primary | ICD-10-CM

## 2021-08-25 DIAGNOSIS — Z53.9 NO SHOW: ICD-10-CM

## 2021-08-25 ASSESSMENT — PAIN SCALES - GENERAL: PAINLEVEL: MODERATE PAIN (4)

## 2021-08-25 NOTE — LETTER
8/25/2021         RE: Markel Padilla  [address] [place] [address]        Dear Colleague,    Thank you for referring your patient, Markel Padilla, to the [facility]. Please see a copy of my visit note below.      This patient was a no show for this scheduled appointment.      Again, thank you for allowing me to participate in the care of your patient.        Sincerely,        Zack Mejia MD

## 2021-08-25 NOTE — PROGRESS NOTES
SUBJECTIVE:  Markel Padilla is a 81 year old male who is seen in consultation at the request of Elizabeth Jamil for left shoulder pain.  He fell 6 days ago, going up stairs carrying groceries.  Pt feels the pain is improving but still some pain.  Hurts more if lies on that side. Today his wife noticed bruising around shoulder     Present symptoms: {PRESENT SXS:817284}  Associated symptoms: {ASSOC SXS:809999}    Treatment up to this point: went to Urgent Care yesterday. Ice, Tylenol. Normal range of motion with minimal pain noted.     Ortho PMH: ***    Past Medical History:   Past Medical History:   Diagnosis Date     Ehrlichiosis      Hemorrhage gastric      Hypercholesteremia      Hypertension      MEDICAL HISTORY OF -     major forearm laceration     Past Surgical History:   Past Surgical History:   Procedure Laterality Date     NO HISTORY OF SURGERY       Family History:   Family History   Problem Relation Age of Onset     Heart Disease Mother      Lipids Mother      C.A.D. Father         age 65     Respiratory Sister         COPD     Hypertension Son      Hypertension Son      Social History:   Social History     Tobacco Use     Smoking status: Former Smoker     Types: Cigarettes     Quit date: 1979     Years since quittin.8     Smokeless tobacco: Current User     Types: Chew     Last attempt to quit: 1989   Substance Use Topics     Alcohol use: Yes     Comment: rare       Review of Systems:  Constitutional:  NEGATIVE for fever, chills, change in weight  Integumentary/Skin:  NEGATIVE for worrisome rashes, moles or lesions  Eyes:  NEGATIVE for vision changes or irritation  ENT/Mouth:  NEGATIVE for ear, mouth and throat problems  Resp:  NEGATIVE for significant cough or SOB  Breast:  NEGATIVE for masses, tenderness or discharge  CV:  NEGATIVE for chest pain, palpitations or peripheral edema  GI:  NEGATIVE for nausea, abdominal pain, heartburn, or change in bowel habits  :  Negative  "  Musculoskeletal:  See HPI above  Neuro:  NEGATIVE for weakness, dizziness or paresthesias  Endocrine:  NEGATIVE for temperature intolerance, skin/hair changes  Heme/allergy/immune:  NEGATIVE for bleeding problems  Psychiatric:  NEGATIVE for changes in mood or affect    OBJECTIVE:  Physical Exam:  There were no vitals taken for this visit.  General Appearance: healthy, alert and in no distress   Skin: no suspicious lesions or rashes  Neuro: Normal strength and tone, mentation intact and speech normal  Vascular: good pulses, and cappillary refill   Lymph: no lymphadenopathy   Psych:  mentation appears normal and affect normal/bright  Resp: no increased work of breathing    Neck Exam:  Cervical range of motion: *** and does not cause neck pain or reproduce shoulder pain.  Sensory deficits: {CERVICAL SENSORY DEFICIT:345823}  Motor deficits: {CERVICAL MOTOR DEFICITS:441866}  DTR's: {CERVICAL DTR'S:576610}    {RIGHT LEFT CAPS:852583} Shoulder Exam:  Shoulder Inspection: {[facility] SHOULDER INSPECTION:017758::\"no swelling, bruising, discoloration, or obvious deformity or asymmetry\"}  Tender: {[facility] SHOULDER TENDERNES:983551}  Non-tender: {[facility] SHOULDER TENDERNES:055757}  Range of Motion:   Active: forward flexion: ***, internal rotation: ***   Passive: forward flexion: ***, external rotation: ***  Strength: forward flexion: {[facility] STRENGTH RANGE:061326}, external rotation: {FSOC STRENGTH RANGE:543900}, [place]: {ABLE:761876::\"Able\"}  Impingement: {ORTHO SHOULDER IMPINGEMENT:765235}  Special tests: {ORTHO SPECIAL TEST:240997}    X-rays:  Obtained 8/24 are reviewed in the office with the patient: mild glenohumeral joint degenerative changes of the left shoulder.  Possible elevation of the distal clavicle, possible distal clavicle fracture, nondisplaced.     MRI:   From *** :  ***    ASSESSMENT:  No diagnosis found.   ***    PLAN:  ***  {INJURY PLAN:5771}      Return to clinic: ***    KEVON Mejia MD  Dept. Orthopedic " Surgery  [facility]

## 2021-10-16 ENCOUNTER — DOCUMENTATION ONLY (OUTPATIENT)
Dept: LAB | Facility: CLINIC | Age: 81
End: 2021-10-16

## 2021-10-16 DIAGNOSIS — I10 HYPERTENSION GOAL BP (BLOOD PRESSURE) < 140/90: Primary | ICD-10-CM

## 2021-10-16 NOTE — PROGRESS NOTES
Markel CECY Padilla has an upcoming lab appointment:    Future Appointments   Date Time Provider Department Center   10/20/2021  7:00 AM AN [facility]   10/21/2021  9:20 AM [facility]     Patient is scheduled for the following lab(s): fasting labs    Patient either has no future order, or has Health Maintenance labs due. Please review and place either future orders or HMPO (Review of Health Maintenance Protocol Orders), as appropriate.    Health Maintenance Due   Topic     ANNUAL REVIEW OF HM ORDERS      LIPID      BMP      Lolita Carter

## 2021-10-20 ENCOUNTER — LAB (OUTPATIENT)
Dept: LAB | Facility: CLINIC | Age: 81
End: 2021-10-20
Payer: COMMERCIAL

## 2021-10-20 DIAGNOSIS — I10 HYPERTENSION GOAL BP (BLOOD PRESSURE) < 140/90: ICD-10-CM

## 2021-10-20 DIAGNOSIS — E78.00 HIGH CHOLESTEROL: ICD-10-CM

## 2021-10-20 LAB
ANION GAP SERPL CALCULATED.3IONS-SCNC: 4 MMOL/L (ref 3–14)
BUN SERPL-MCNC: 12 MG/DL (ref 7–30)
CALCIUM SERPL-MCNC: 9.2 MG/DL (ref 8.5–10.1)
CHLORIDE BLD-SCNC: 106 MMOL/L (ref 94–109)
CO2 SERPL-SCNC: 27 MMOL/L (ref 20–32)
CREAT SERPL-MCNC: 0.9 MG/DL (ref 0.66–1.25)
GFR SERPL CREATININE-BSD FRML MDRD: 80 ML/MIN/1.73M2
GLUCOSE BLD-MCNC: 93 MG/DL (ref 70–99)
POTASSIUM BLD-SCNC: 4.7 MMOL/L (ref 3.4–5.3)
SODIUM SERPL-SCNC: 137 MMOL/L (ref 133–144)

## 2021-10-20 PROCEDURE — 36415 COLL VENOUS BLD VENIPUNCTURE: CPT

## 2021-10-20 PROCEDURE — 80061 LIPID PANEL: CPT

## 2021-10-20 PROCEDURE — 80048 BASIC METABOLIC PNL TOTAL CA: CPT

## 2021-10-20 NOTE — PROGRESS NOTES
Assessment & Plan       ICD-10-CM    1. Hypertension goal BP (blood pressure) < 140/90  I10 amLODIPine (NORVASC) 2.5 MG tablet     lisinopril (ZESTRIL) 10 MG tablet   2. High cholesterol  E78.00 simvastatin (ZOCOR) 40 MG tablet     Lipid panel reflex to direct LDL Fasting   3. Gastroesophageal reflux disease, unspecified whether esophagitis present  K21.9 omeprazole (PRILOSEC) 20 MG DR capsule   4. Need for prophylactic vaccination and inoculation against influenza  Z23 INFLUENZA, QUAD, HIGH DOSE, PF, 65YR + (FLUZONE HD)     medical conditions are stable. meds refilled.  Work on Healthy diet and exercise. Getting heart rate elevated for 30 mins most days of week.  Labs reviewed.       Return in about 6 months (around 4/21/2022) for recheck.    [facility]    Maddison Jean Baptiste is a 81 year old who presents for the following health issues     HPI     Hyperlipidemia Follow-Up      Are you regularly taking any medication or supplement to lower your cholesterol?   Yes- simvastin    Are you having muscle aches or other side effects that you think could be caused by your cholesterol lowering medication?  No    Hypertension Follow-up      Do you check your blood pressure regularly outside of the clinic? Yes     Are you following a low salt diet? Yes    Are your blood pressures ever more than 140 on the top number (systolic) OR more   than 90 on the bottom number (diastolic), for example 140/90? Yes but after cutting grass or walking on treadmill     GERD stable on omeprazole.     Review of Systems   Constitutional, HEENT, cardiovascular, pulmonary, gi and gu systems are negative, except as otherwise noted.      Objective    /70   Pulse 69   Wt 81.2 kg (179 lb)   SpO2 97%   BMI 29.33 kg/m    Body mass index is 29.33 kg/m .  Physical Exam   GENERAL: healthy, alert and no distress  RESP: lungs clear to auscultation - no rales, rhonchi or wheezes  CV: regular rate  and rhythm, normal S1 S2, no S3 or S4, no murmur, click or rub, no peripheral edema and peripheral pulses strong  ABDOMEN: soft, nontender, no hepatosplenomegaly, no masses and bowel sounds normal  MS: no gross musculoskeletal defects noted, no edema    Labs reviewed.

## 2021-10-20 NOTE — LETTER
October 22, 2021      Markel Padilla  [address] [address] [address]        Mr. Padilla,     All of your labs were normal/near normal for you.     Please contact the clinic if you have additional questions.  Thank you.     Sincerely,     Jonathan Oliver PA-C       Resulted Orders   Basic metabolic panel  (Ca, Cl, CO2, Creat, Gluc, K, Na, BUN)   Result Value Ref Range    Sodium 137 133 - 144 mmol/L    Potassium 4.7 3.4 - 5.3 mmol/L    Chloride 106 94 - 109 mmol/L    Carbon Dioxide (CO2) 27 20 - 32 mmol/L    Anion Gap 4 3 - 14 mmol/L    Urea Nitrogen 12 7 - 30 mg/dL    Creatinine 0.90 0.66 - 1.25 mg/dL    Calcium 9.2 8.5 - 10.1 mg/dL    Glucose 93 70 - 99 mg/dL    GFR Estimate 80 >60 mL/min/1.73m2      Comment:      As of July 11, 2021, eGFR is calculated by the CKD-EPI creatinine equation, without race adjustment. eGFR can be influenced by muscle mass, exercise, and diet. The reported eGFR is an estimation only and is only applicable if the renal function is stable.   Lipid panel reflex to direct LDL Fasting   Result Value Ref Range    Cholesterol 151 <200 mg/dL    Triglycerides 84 <150 mg/dL    Direct Measure HDL 58 >=40 mg/dL    LDL Cholesterol Calculated 76 <=100 mg/dL    Non HDL Cholesterol 93 <130 mg/dL    Narrative    Cholesterol  Desirable:  <200 mg/dL    Triglycerides  Normal:  Less than 150 mg/dL  Borderline High:  150-199 mg/dL  High:  200-499 mg/dL  Very High:  Greater than or equal to 500 mg/dL    Direct Measure HDL  Female:  Greater than or equal to 50 mg/dL   Male:  Greater than or equal to 40 mg/dL    LDL Cholesterol  Desirable:  <100mg/dL  Above Desirable:  100-129 mg/dL   Borderline High:  130-159 mg/dL   High:  160-189 mg/dL   Very High:  >= 190 mg/dL    Non HDL Cholesterol  Desirable:  130 mg/dL  Above Desirable:  130-159 mg/dL  Borderline High:  160-189 mg/dL  High:  190-219 mg/dL  Very High:  Greater than or equal to 220 mg/dL

## 2021-10-21 ENCOUNTER — OFFICE VISIT (OUTPATIENT)
Dept: FAMILY MEDICINE | Facility: CLINIC | Age: 81
End: 2021-10-21
Payer: COMMERCIAL

## 2021-10-21 VITALS
BODY MASS INDEX: 29.33 KG/M2 | HEART RATE: 69 BPM | SYSTOLIC BLOOD PRESSURE: 138 MMHG | OXYGEN SATURATION: 97 % | WEIGHT: 179 LBS | DIASTOLIC BLOOD PRESSURE: 70 MMHG

## 2021-10-21 DIAGNOSIS — K21.9 GASTROESOPHAGEAL REFLUX DISEASE, UNSPECIFIED WHETHER ESOPHAGITIS PRESENT: ICD-10-CM

## 2021-10-21 DIAGNOSIS — Z23 NEED FOR PROPHYLACTIC VACCINATION AND INOCULATION AGAINST INFLUENZA: ICD-10-CM

## 2021-10-21 DIAGNOSIS — E78.00 HIGH CHOLESTEROL: ICD-10-CM

## 2021-10-21 DIAGNOSIS — I10 HYPERTENSION GOAL BP (BLOOD PRESSURE) < 140/90: Primary | ICD-10-CM

## 2021-10-21 LAB
CHOLEST SERPL-MCNC: 151 MG/DL
HDLC SERPL-MCNC: 58 MG/DL
LDLC SERPL CALC-MCNC: 76 MG/DL
NONHDLC SERPL-MCNC: 93 MG/DL
TRIGL SERPL-MCNC: 84 MG/DL

## 2021-10-21 PROCEDURE — 99214 OFFICE O/P EST MOD 30 MIN: CPT | Mod: 25 | Performed by: PHYSICIAN ASSISTANT

## 2021-10-21 PROCEDURE — 90662 IIV NO PRSV INCREASED AG IM: CPT | Performed by: PHYSICIAN ASSISTANT

## 2021-10-21 PROCEDURE — G0008 ADMIN INFLUENZA VIRUS VAC: HCPCS | Performed by: PHYSICIAN ASSISTANT

## 2021-10-21 RX ORDER — LISINOPRIL 10 MG/1
10 TABLET ORAL DAILY
Qty: 90 TABLET | Refills: 1 | Status: SHIPPED | OUTPATIENT
Start: 2021-10-21 | End: 2022-06-15

## 2021-10-21 RX ORDER — AMLODIPINE BESYLATE 2.5 MG/1
2.5 TABLET ORAL DAILY
Qty: 90 TABLET | Refills: 1 | Status: SHIPPED | OUTPATIENT
Start: 2021-10-21 | End: 2022-04-15

## 2021-10-21 RX ORDER — SIMVASTATIN 40 MG
40 TABLET ORAL AT BEDTIME
Qty: 90 TABLET | Refills: 1 | Status: SHIPPED | OUTPATIENT
Start: 2021-10-21 | End: 2022-06-15

## 2021-10-26 ENCOUNTER — TRANSFERRED RECORDS (OUTPATIENT)
Dept: HEALTH INFORMATION MANAGEMENT | Facility: CLINIC | Age: 81
End: 2021-10-26
Payer: COMMERCIAL

## 2021-12-26 DIAGNOSIS — I10 HYPERTENSION GOAL BP (BLOOD PRESSURE) < 140/90: ICD-10-CM

## 2021-12-29 ENCOUNTER — TELEPHONE (OUTPATIENT)
Dept: FAMILY MEDICINE | Facility: CLINIC | Age: 81
End: 2021-12-29
Payer: COMMERCIAL

## 2021-12-29 RX ORDER — LISINOPRIL 10 MG/1
TABLET ORAL
Qty: 90 TABLET | Refills: 1 | OUTPATIENT
Start: 2021-12-29

## 2021-12-29 NOTE — TELEPHONE ENCOUNTER
Pt wife notified that prescription's are available at [facility] from 10/21/21.  She will contact them.  Naina MUNOZN, RN

## 2021-12-29 NOTE — TELEPHONE ENCOUNTER
Reason for Call:  Medication or medication refill:    Do you use a [facility]?  Name of the pharmacy and phone number for the current request: [id number] - [place] - [address]    Name of the medication requested:   amLODIPine (NORVASC) 2.5 MG tablet  lisinopril (ZESTRIL) 10 MG tablet  omeprazole (PRILOSEC) 20 MG DR capsule  simvastatin (ZOCOR) 40 MG tablet      Other request: patient went to get meds and pharmacy told him to reach out to provider-- pt is wondering why these are not ready to be picked up.     Can we leave a detailed message on this number? YES    Phone number patient can be reached at: Home number on file [phone] (home)    Best Time: any    Call taken on 12/29/2021 at 8:44 AM by Meagan Sun

## 2022-01-05 ENCOUNTER — OFFICE VISIT (OUTPATIENT)
Dept: OTHER | Facility: CLINIC | Age: 82
End: 2022-01-05
Payer: COMMERCIAL

## 2022-01-05 VITALS
BODY MASS INDEX: 26.52 KG/M2 | RESPIRATION RATE: 16 BRPM | HEART RATE: 61 BPM | OXYGEN SATURATION: 99 % | HEIGHT: 66 IN | SYSTOLIC BLOOD PRESSURE: 124 MMHG | WEIGHT: 165 LBS | TEMPERATURE: 98 F | DIASTOLIC BLOOD PRESSURE: 83 MMHG

## 2022-01-05 DIAGNOSIS — Z00.00 ENCOUNTER FOR MEDICARE ANNUAL WELLNESS EXAM: Primary | ICD-10-CM

## 2022-01-05 PROCEDURE — G0438 PPPS, INITIAL VISIT: HCPCS | Performed by: FAMILY MEDICINE

## 2022-01-05 ASSESSMENT — ACTIVITIES OF DAILY LIVING (ADL): CURRENT_FUNCTION: NO ASSISTANCE NEEDED

## 2022-01-05 ASSESSMENT — PAIN SCALES - GENERAL: PAINLEVEL: NO PAIN (0)

## 2022-01-05 ASSESSMENT — MIFFLIN-ST. JEOR: SCORE: 1396.19

## 2022-01-05 NOTE — PATIENT INSTRUCTIONS
Patient Education   Personalized Prevention Plan  You are due for the preventive services outlined below.  Your care team is available to assist you in scheduling these services.  If you have already completed any of these items, please share that information with your care team to update in your medical record.  Health Maintenance Due   Topic Date Due     ANNUAL REVIEW OF HM ORDERS  Never done     Discuss Advance Care Planning  06/07/2016     FALL RISK ASSESSMENT  09/29/2021

## 2022-01-05 NOTE — PROGRESS NOTES
"Assessment & Plan   No diagnosis found.    Follow Up/Next Steps    No follow-ups on file.  No concerns at this time. Continue to follow up with PCP regarding chronic conditions.    Counseling and Education  Reviewed preventive health counseling, as reflected in patient instructions      BMI:   Estimated body mass index is 26.63 kg/m  as calculated from the following:    Height as of this encounter: 1.676 m (5' 6\").    Weight as of this encounter: 74.8 kg (165 lb).   Weight management plan: Discussed healthy diet and exercise guidelines      Appropriate preventive services were discussed with this patient, including applicable screening as appropriate for cardiovascular disease, diabetes, osteopenia/osteoporosis, and glaucoma.  As appropriate for age/gender, discussed screening for colorectal cancer, prostate cancer, breast cancer, and cervical cancer. Checklist reviewing preventive services available has been given to the patient.    Reviewed patients plan of care and provided an AVS. The Basic Care Plan (routine screening as documented in Health Maintenance) for Markel meets the Care Plan requirement. This Care Plan has been established and reviewed with the Patient.    Visit Provider: Ulysses Campbell RN  Supervising Provider: Zoe Rodriguez MD, MD  [facility]       Subjective   Markel is a 81 year old who is being seen for an Annual Wellness Visit  accompanied by his none.    Healthy Habits:     In general, how would you rate your overall health?  Good    Frequency of exercise:  6-7 days/week    Duration of exercise:  30-45 minutes    Do you usually eat at least 4 servings of fruit and vegetables a day, include whole grains    & fiber and avoid regularly eating high fat or \"junk\" foods?  Yes    Taking medications regularly:  Yes    Medication side effects:  None    Ability to successfully perform activities of daily living:  No assistance needed    Home Safety:  " Lack of grab bars in the bathroom    Hearing Impairment:  Difficulty following a conversation in a noisy restaurant or crowded room and need to ask people to speak up or repeat themselves    In the past 6 months, have you been bothered by leaking of urine?  No    In general, how would you rate your overall mental or emotional health?  Good      PHQ-2 Total Score: 0    Additional concerns today:  No    Do you feel safe in your environment? Yes    Have you ever done Advance Care Planning? (For example, a Health Directive, POLST, or a discussion with a medical provider or your loved ones about your wishes): Yes, patient states has an Advance Care Planning document and will bring a copy to the clinic.    Fall risk  Fallen 2 or more times in the past year?: No  Any fall with injury in the past year?: No  Cognitive Screening   1) Repeat 3 items (Leader, Season, Table)    2) Clock draw: NORMAL  3) 3 item recall: Recalls 3 objects  Results: NORMAL clock, 1-2 items recalled: COGNITIVE IMPAIRMENT LESS LIKELY    Mini-CogTM Copyright MAGO Hernández. Licensed by the author for use in [facility]; reprinted with permission (pawan@[email or web address]). All rights reserved.      Do you have sleep apnea, excessive snoring or daytime drowsiness?: no    Reviewed and updated as needed this visit by clinical staff  Tobacco  Allergies  Meds  Problems  Med Hx  Surg Hx  Fam Hx  Soc Hx         Social History     Tobacco Use     Smoking status: Former Smoker     Types: Cigarettes     Quit date: 1979     Years since quittin.2     Smokeless tobacco: Current User     Types: Chew     Last attempt to quit: 1989   Substance Use Topics     Alcohol use: Yes     Comment: a glass of wine everyday at lunch       Current providers sharing in care for this patient include:   Patient Care Team:  Jonathan Oliver PA-C as PCP - General (Family Practice)  Jonathan Oliver PA-C as Assigned PCP  Zack Mejia MD as Assigned  "Musculoskeletal Provider    The following health maintenance items are reviewed in Epic and correct as of today:  Health Maintenance Due   Topic Date Due     ANNUAL REVIEW OF HM ORDERS  Never done     ADVANCE CARE PLANNING  06/07/2016     MEDICARE ANNUAL WELLNESS VISIT  01/27/2017     FALL RISK ASSESSMENT  09/29/2021               Objective    /83 (BP Location: Left arm, Patient Position: Sitting, Cuff Size: Adult Regular)   Pulse 61   Temp 98  F (36.7  C) (Temporal)   Resp 16   Ht 1.676 m (5' 6\")   Wt 74.8 kg (165 lb)   SpO2 99%   BMI 26.63 kg/m   Estimated body mass index is 26.63 kg/m  as calculated from the following:    Height as of this encounter: 1.676 m (5' 6\").    Weight as of this encounter: 74.8 kg (165 lb).  Physical Exam  Patient appears comfortable and in no acute distress.        Identified Health Risks:  "

## 2022-01-11 ENCOUNTER — ANCILLARY PROCEDURE (OUTPATIENT)
Dept: CT IMAGING | Facility: CLINIC | Age: 82
End: 2022-01-11
Attending: PHYSICIAN ASSISTANT
Payer: COMMERCIAL

## 2022-01-11 ENCOUNTER — OFFICE VISIT (OUTPATIENT)
Dept: FAMILY MEDICINE | Facility: CLINIC | Age: 82
End: 2022-01-11
Payer: COMMERCIAL

## 2022-01-11 VITALS
WEIGHT: 176.8 LBS | TEMPERATURE: 98.1 F | OXYGEN SATURATION: 98 % | BODY MASS INDEX: 28.42 KG/M2 | SYSTOLIC BLOOD PRESSURE: 137 MMHG | HEIGHT: 66 IN | HEART RATE: 69 BPM | RESPIRATION RATE: 20 BRPM | DIASTOLIC BLOOD PRESSURE: 67 MMHG

## 2022-01-11 DIAGNOSIS — D72.828 OTHER ELEVATED WHITE BLOOD CELL (WBC) COUNT: ICD-10-CM

## 2022-01-11 DIAGNOSIS — R73.9 HYPERGLYCEMIA: ICD-10-CM

## 2022-01-11 DIAGNOSIS — R81 GLUCOSURIA: ICD-10-CM

## 2022-01-11 DIAGNOSIS — R10.13 ABDOMINAL PAIN, EPIGASTRIC: Primary | ICD-10-CM

## 2022-01-11 DIAGNOSIS — I49.9 IRREGULAR HEART RHYTHM: ICD-10-CM

## 2022-01-11 DIAGNOSIS — R10.13 ABDOMINAL PAIN, EPIGASTRIC: ICD-10-CM

## 2022-01-11 LAB
ALBUMIN SERPL-MCNC: 4.1 G/DL (ref 3.4–5)
ALBUMIN UR-MCNC: NEGATIVE MG/DL
ALP SERPL-CCNC: 99 U/L (ref 40–150)
ALT SERPL W P-5'-P-CCNC: 39 U/L (ref 0–70)
ANION GAP SERPL CALCULATED.3IONS-SCNC: 10 MMOL/L (ref 3–14)
APPEARANCE UR: CLEAR
AST SERPL W P-5'-P-CCNC: 31 U/L (ref 0–45)
BASOPHILS # BLD AUTO: 0.1 10E3/UL (ref 0–0.2)
BASOPHILS NFR BLD AUTO: 0 %
BILIRUB SERPL-MCNC: 1.4 MG/DL (ref 0.2–1.3)
BILIRUB UR QL STRIP: NEGATIVE
BUN SERPL-MCNC: 13 MG/DL (ref 7–30)
CALCIUM SERPL-MCNC: 9.8 MG/DL (ref 8.5–10.1)
CHLORIDE BLD-SCNC: 101 MMOL/L (ref 94–109)
CO2 SERPL-SCNC: 24 MMOL/L (ref 20–32)
COLOR UR AUTO: YELLOW
CREAT SERPL-MCNC: 0.85 MG/DL (ref 0.66–1.25)
EOSINOPHIL # BLD AUTO: 0 10E3/UL (ref 0–0.7)
EOSINOPHIL NFR BLD AUTO: 0 %
ERYTHROCYTE [DISTWIDTH] IN BLOOD BY AUTOMATED COUNT: 12.5 % (ref 10–15)
GFR SERPL CREATININE-BSD FRML MDRD: 87 ML/MIN/1.73M2
GLUCOSE BLD-MCNC: 183 MG/DL (ref 60–99)
GLUCOSE BLD-MCNC: 193 MG/DL (ref 70–99)
GLUCOSE UR STRIP-MCNC: >=1000 MG/DL
HBA1C MFR BLD: 5.4 % (ref 0–5.6)
HCT VFR BLD AUTO: 46.7 % (ref 40–53)
HGB BLD-MCNC: 15.8 G/DL (ref 13.3–17.7)
HGB UR QL STRIP: NEGATIVE
KETONES UR STRIP-MCNC: NEGATIVE MG/DL
LEUKOCYTE ESTERASE UR QL STRIP: NEGATIVE
LIPASE SERPL-CCNC: 44 U/L (ref 73–393)
LYMPHOCYTES # BLD AUTO: 1.9 10E3/UL (ref 0.8–5.3)
LYMPHOCYTES NFR BLD AUTO: 10 %
MCH RBC QN AUTO: 31.7 PG (ref 26.5–33)
MCHC RBC AUTO-ENTMCNC: 33.8 G/DL (ref 31.5–36.5)
MCV RBC AUTO: 94 FL (ref 78–100)
MONOCYTES # BLD AUTO: 1.5 10E3/UL (ref 0–1.3)
MONOCYTES NFR BLD AUTO: 8 %
NEUTROPHILS # BLD AUTO: 15.3 10E3/UL (ref 1.6–8.3)
NEUTROPHILS NFR BLD AUTO: 81 %
NITRATE UR QL: NEGATIVE
PH UR STRIP: 5.5 [PH] (ref 5–7)
PLATELET # BLD AUTO: 166 10E3/UL (ref 150–450)
POTASSIUM BLD-SCNC: 4 MMOL/L (ref 3.4–5.3)
PROT SERPL-MCNC: 7.2 G/DL (ref 6.8–8.8)
RBC # BLD AUTO: 4.99 10E6/UL (ref 4.4–5.9)
SODIUM SERPL-SCNC: 135 MMOL/L (ref 133–144)
SP GR UR STRIP: 1.02 (ref 1–1.03)
TROPONIN I SERPL HS-MCNC: 9 NG/L
TSH SERPL DL<=0.005 MIU/L-ACNC: 1.08 MU/L (ref 0.4–4)
UROBILINOGEN UR STRIP-ACNC: 0.2 E.U./DL
WBC # BLD AUTO: 18.7 10E3/UL (ref 4–11)

## 2022-01-11 PROCEDURE — 84443 ASSAY THYROID STIM HORMONE: CPT | Performed by: PHYSICIAN ASSISTANT

## 2022-01-11 PROCEDURE — 84484 ASSAY OF TROPONIN QUANT: CPT | Performed by: PHYSICIAN ASSISTANT

## 2022-01-11 PROCEDURE — 80053 COMPREHEN METABOLIC PANEL: CPT | Performed by: PHYSICIAN ASSISTANT

## 2022-01-11 PROCEDURE — 93000 ELECTROCARDIOGRAM COMPLETE: CPT | Performed by: PHYSICIAN ASSISTANT

## 2022-01-11 PROCEDURE — 99214 OFFICE O/P EST MOD 30 MIN: CPT | Performed by: PHYSICIAN ASSISTANT

## 2022-01-11 PROCEDURE — 83036 HEMOGLOBIN GLYCOSYLATED A1C: CPT | Performed by: PHYSICIAN ASSISTANT

## 2022-01-11 PROCEDURE — 83690 ASSAY OF LIPASE: CPT | Performed by: PHYSICIAN ASSISTANT

## 2022-01-11 PROCEDURE — 81003 URINALYSIS AUTO W/O SCOPE: CPT | Performed by: PHYSICIAN ASSISTANT

## 2022-01-11 PROCEDURE — 85025 COMPLETE CBC W/AUTO DIFF WBC: CPT | Performed by: PHYSICIAN ASSISTANT

## 2022-01-11 PROCEDURE — 74177 CT ABD & PELVIS W/CONTRAST: CPT | Mod: TC | Performed by: RADIOLOGY

## 2022-01-11 PROCEDURE — 36415 COLL VENOUS BLD VENIPUNCTURE: CPT | Performed by: PHYSICIAN ASSISTANT

## 2022-01-11 RX ORDER — IOPAMIDOL 755 MG/ML
98 INJECTION, SOLUTION INTRAVASCULAR ONCE
Status: COMPLETED | OUTPATIENT
Start: 2022-01-11 | End: 2022-01-11

## 2022-01-11 RX ADMIN — IOPAMIDOL 98 ML: 755 INJECTION, SOLUTION INTRAVASCULAR at 13:26

## 2022-01-11 ASSESSMENT — PAIN SCALES - GENERAL: PAINLEVEL: MODERATE PAIN (4)

## 2022-01-11 ASSESSMENT — MIFFLIN-ST. JEOR: SCORE: 1449.71

## 2022-01-11 NOTE — PROGRESS NOTES
"      Maddison Jean Baptiste is a 81 year old who presents for the following health issues     HPI     Pain History:  When did you first notice your pain? - Less than 1 week   Have you seen anyone else for your pain? No  Where in your body do you have pain? Abdominal/Flank Pain  Onset/Duration: started last night  Description:   Character: \"Dull ache plus\"  Location: epigastric region  Radiation: None  Intensity: mild, moderate  Progression of Symptoms:  improving  Accompanying Signs & Symptoms:  Fever/Chills: no  Gas/Bloating: no  Nausea: no  Vomitting: YES  Diarrhea: no  Constipation: no  Dysuria or Hematuria: YES  History:   Trauma: tick bite in [employer] - 15 yrs ago  Previous similar pain: no  Previous tests done: Colonoscopy  Precipitating factors:   Does the pain change with:     Food: no    Bowel Movement: YES    Urination: no   Other factors:  not applicable  Therapies tried and outcome: Pepto bismal    Symptoms evolved last noc.   No blood in stools. bm helped his pain. Some upper abd pain. No diarrhea. No constipation. Mild malaise. No chest pain/sob. No fever . Vomited this am. No hematemesis. Dec appetite. No sore throat . No cold symptoms. No profound irritative/obst voiding symptoms. No profound palpitations.       Review of Systems   Constitutional, HEENT, cardiovascular, pulmonary, GI, , musculoskeletal, neuro, skin, endocrine and psych systems are negative, except as otherwise noted.      Objective    /67   Pulse 69   Temp 98.1  F (36.7  C) (Tympanic)   Resp 20   Ht 1.676 m (5' 6\")   Wt 80.2 kg (176 lb 12.8 oz)   SpO2 98%   BMI 28.54 kg/m    Body mass index is 28.54 kg/m .  Physical Exam   Eye exam - right eye normal lid, conjunctiva, cornea, pupil and fundus, left eye normal lid, conjunctiva, cornea, pupil and fundus.  Thyroid not palpable, not enlarged, no nodules detected.  CHEST:chest clear to IPPA, no tachypnea, retractions or cyanosis and Heart exam detail:S1, S2 normal, no murmur, " click, rub or gallop, regular rate and rhythm, irregularly irregular rhythm, chest is clear without rales or wheezing, no pedal edema, no JVD, no hepatosplenomegaly.  ABDOMEN: mild tenderness in the epigastric area, without rebound, guarding, mass or organomegaly. Abdomen is soft and bowel sounds are normal.  Ekg: PAC's  , no a fib.    Makrel was seen today for abdominal pain.    Diagnoses and all orders for this visit:    Abdominal pain, epigastric  -     REVIEW OF HEALTH MAINTENANCE PROTOCOL ORDERS  -     Comprehensive metabolic panel (BMP + Alb, Alk Phos, ALT, AST, Total. Bili, TP); Future  -     CBC with platelets and differential; Future  -     Lipase; Future  -     UA Macro with Reflex to Micro and Culture - lab collect; Future  -     Troponin I; Future  -     Troponin I  -     UA Macro with Reflex to Micro and Culture - lab collect  -     Lipase  -     CBC with platelets and differential  -     Comprehensive metabolic panel (BMP + Alb, Alk Phos, ALT, AST, Total. Bili, TP)  -     CT Abdomen Pelvis w Contrast; Future    Irregular heart rhythm  -     TSH with free T4 reflex; Future  -     EKG 12-lead complete w/read - Clinics  -     TSH with free T4 reflex    Glucosuria  -     Cancel: Glucose; Future  -     Glucose whole blood; Future  -     Glucose whole blood    Hyperglycemia  -     Hemoglobin A1c; Future  -     Hemoglobin A1c    Other elevated white blood cell (WBC) count  -     CBC with platelets and differential; Future      Advised supportive and symptomatic treatment.  Follow up with Provider - if condition persists or worsens.   work on lifestyle modification

## 2022-01-12 ENCOUNTER — TELEPHONE (OUTPATIENT)
Dept: FAMILY MEDICINE | Facility: CLINIC | Age: 82
End: 2022-01-12
Payer: MEDICARE

## 2022-01-12 NOTE — TELEPHONE ENCOUNTER
Spoke with Markel (as he was in clinic with his wife whom I was seeing).     He denies any abdominal pain, nausea, vomiting or symptoms with eating.  He states he feels just fine.     Reviewed CT results and relayed Thomas's message.     Roxi Vargas PA-C

## 2022-01-12 NOTE — TELEPHONE ENCOUNTER
----- Message from Thomas Prasad PA-C sent at 1/12/2022  5:22 AM CST -----  Alexandrea,  Your ct scan revealed some stones in your gallbladder. I find it unlikely that this caused your symptoms yesterday. No other abnormalities were noted. (find out how alexandrea is feeling today, if his condition has improved no further evaluation will be required at this time).    Thomas    [id number]:4-6 Days;

## 2022-01-12 NOTE — TELEPHONE ENCOUNTER
Patient walked into clinic with his wife.  His wife has an appointment now with Roxi Vargas PA-C.  Roxi Vargas PA-C stated that she will advise patient of this, as she needs to put on PPE for appointment.    Thuy BRAY,BSN  Triage Nurse  [facility]  Ph: [phone]

## 2022-01-12 NOTE — TELEPHONE ENCOUNTER
Called [phone] (home)     Did patient answer the phone: No, left a message on voicemail to return call to the [facility] at [phone].    Thuy RN,BSN  Triage Nurse  [facility]: [facility]

## 2022-01-13 NOTE — TELEPHONE ENCOUNTER
Patient notified of below and voiced understanding and agreement. Lab appt scheduled.  He knows to call clinic or be seen if symptoms return or new symptoms arise.  Renata Yo RN  [facility]

## 2022-01-13 NOTE — TELEPHONE ENCOUNTER
One thing that was noted at our visit was an elevated wbc . I would like this checked via a lab only visit in 2-3 wks.

## 2022-01-21 LAB
CREAT BLD-MCNC: 0.7 MG/DL (ref 0.7–1.3)
GFR SERPL CREATININE-BSD FRML MDRD: >60 ML/MIN/1.73M2

## 2022-01-21 PROCEDURE — 82565 ASSAY OF CREATININE: CPT

## 2022-01-26 ENCOUNTER — LAB (OUTPATIENT)
Dept: LAB | Facility: CLINIC | Age: 82
End: 2022-01-26
Payer: COMMERCIAL

## 2022-01-26 DIAGNOSIS — D72.828 OTHER ELEVATED WHITE BLOOD CELL (WBC) COUNT: ICD-10-CM

## 2022-01-26 LAB
BASOPHILS # BLD AUTO: 0 10E3/UL (ref 0–0.2)
BASOPHILS NFR BLD AUTO: 0 %
EOSINOPHIL # BLD AUTO: 0.3 10E3/UL (ref 0–0.7)
EOSINOPHIL NFR BLD AUTO: 3 %
ERYTHROCYTE [DISTWIDTH] IN BLOOD BY AUTOMATED COUNT: 12.6 % (ref 10–15)
HCT VFR BLD AUTO: 43.2 % (ref 40–53)
HGB BLD-MCNC: 14.9 G/DL (ref 13.3–17.7)
LYMPHOCYTES # BLD AUTO: 5 10E3/UL (ref 0.8–5.3)
LYMPHOCYTES NFR BLD AUTO: 49 %
MCH RBC QN AUTO: 32.8 PG (ref 26.5–33)
MCHC RBC AUTO-ENTMCNC: 34.5 G/DL (ref 31.5–36.5)
MCV RBC AUTO: 95 FL (ref 78–100)
MONOCYTES # BLD AUTO: 1 10E3/UL (ref 0–1.3)
MONOCYTES NFR BLD AUTO: 10 %
NEUTROPHILS # BLD AUTO: 4 10E3/UL (ref 1.6–8.3)
NEUTROPHILS NFR BLD AUTO: 38 %
PLATELET # BLD AUTO: 209 10E3/UL (ref 150–450)
RBC # BLD AUTO: 4.54 10E6/UL (ref 4.4–5.9)
WBC # BLD AUTO: 10.3 10E3/UL (ref 4–11)

## 2022-01-26 PROCEDURE — 36415 COLL VENOUS BLD VENIPUNCTURE: CPT

## 2022-01-26 PROCEDURE — 85025 COMPLETE CBC W/AUTO DIFF WBC: CPT

## 2022-02-15 ENCOUNTER — TELEPHONE (OUTPATIENT)
Dept: NURSING | Facility: CLINIC | Age: 82
End: 2022-02-15
Payer: MEDICARE

## 2022-02-15 NOTE — TELEPHONE ENCOUNTER
Patient calling for lab results from 1-26-22.  Writer reviewed results as they were all normal.  Patient verbalized understanding and has no additional questions.     Kayce Pena RN  [facility] Nurse Advisor  2:08 PM 2/15/2022

## 2022-02-16 DIAGNOSIS — L29.9 PRURITIC DISORDER: ICD-10-CM

## 2022-02-16 DIAGNOSIS — R21 RASH: ICD-10-CM

## 2022-02-16 RX ORDER — TRIAMCINOLONE ACETONIDE 1 MG/G
CREAM TOPICAL
Qty: 80 G | Refills: 0 | Status: SHIPPED | OUTPATIENT
Start: 2022-02-16

## 2022-03-08 ENCOUNTER — OFFICE VISIT (OUTPATIENT)
Dept: ORTHOPEDICS | Facility: CLINIC | Age: 82
End: 2022-03-08
Payer: COMMERCIAL

## 2022-03-08 VITALS
WEIGHT: 178 LBS | DIASTOLIC BLOOD PRESSURE: 58 MMHG | SYSTOLIC BLOOD PRESSURE: 142 MMHG | HEIGHT: 66 IN | BODY MASS INDEX: 28.61 KG/M2

## 2022-03-08 DIAGNOSIS — M25.551 LATERAL PAIN OF RIGHT HIP: Primary | ICD-10-CM

## 2022-03-08 PROCEDURE — 99203 OFFICE O/P NEW LOW 30 MIN: CPT | Performed by: PEDIATRICS

## 2022-03-08 NOTE — LETTER
3/8/2022         RE: Markel Padilla  [address] [place] [address]        Dear Colleague,    Thank you for referring your patient, Markel Padilla, to the [facility] [place]. Please see a copy of my visit note below.    ASSESSMENT & PLAN    Markel was seen today for pain.    Diagnoses and all orders for this visit:    Lateral pain of right hip      Suspect from change in walking surface, though walking in general is not new for him.      See Patient Instructions  Patient Instructions   Consistent with lateral hip source, soft tissue.  No joint concerns based on exam today, as well as with past imaging reviewed.  May use icing, heat, over-the-counter medication as needed for soreness.  Inquire about topical treatments.  We discussed potential use of Voltaren gel topically.  Advise rehab approach next.  Home exercises reviewed today.  Physical therapy also offered, and you may contact clinic if interested in PT referral.  Plan to monitor over the next few weeks to start.  Anticipate improvement during that time.  If persistent issues, likely physical therapy next.  We discussed consideration of additional imaging of the affected area with MRI, but this is not required currently given assessment and plan for other intervention.  Additionally, we briefly discussed consideration of lateral hip injection, but deferred for now given acute issue, also without reproducible tenderness, and with lack of interest in injection currently.    If you have any further questions for your physician or physician s care team you can call [phone] and use option 3 to leave a voice message. Calls received during business hours will be returned same day.        Ellis Dacosta DO  [facility] [place]    -----  Chief Complaint   Patient presents with     Right Hip - Pain       SUBJECTIVE  Markel Padilla is a/an 81 year old male who is seen as a self  "referral for evaluation of right lateral hip pain, which has been present for about 1 weeks.  Denies any groin pain .     Had been walking on an incline on a treadmill.      The patient is seen with their wife.      Onset: 1 week(s) ago. Reports insidious onset without acute precipitating event.  Location of Pain: right lateral hip and thigh   Worsened by: stairs, laying down    Better with: rest   Treatments tried: ice and heat, analgesics,   Associated symptoms: no distal numbness or tingling; denies swelling or warmth    Orthopedic/Surgical history: NO  Social History/Occupation: denies     No family history pertinent to patient's problem today.     **  Treadmill this winter.  Then went for walk outside and noted pain. However, same distance, and no injury. But, was on snowy surface, uneven terrain.      REVIEW OF SYSTEMS:  Review of Systems      OBJECTIVE:  BP (!) 142/58   Ht 1.676 m (5' 6\")   Wt 80.7 kg (178 lb)   BMI 28.73 kg/m     General: healthy, alert and in no distress  HEENT: no scleral icterus or conjunctival erythema  Skin: no suspicious lesions or rash. No jaundice.  CV: distal perfusion intact   Resp: normal respiratory effort without conversational dyspnea   Psych: normal mood and affect  Gait: ambulates independently  Neuro: Normal light sensory exam of  extremity       Right hip exam    Inspection:      no edema or ecchymosis in hip area    ROM:     Grossly full active and passive ROM, no pain with motion    Strength: no pain supine hip abduction, adduction; some lateral pain mild with resisted abduction    Tender: none focal    Non Tender:      greater trochanter    Sensation:      grossly intact in hip and thigh    Special Tests:      neg (-) FADIR       Some lateral pain with Kenn       Log roll neg        RADIOLOGY:  I independently visualized and reviewed these images with the patient  There appears to be slight arthrosis right hip with mild spurring, otherwise joint space appears " preserved. No concerning abnormality greater trochanter.    Narrative & Impression   CT ABDOMEN/PELVIS WITH CONTRAST January 11, 2022 1:29 PM     CLINICAL HISTORY: Abdominal pain, acute, nonlocalized. Abdominal pain,  epigastric.     TECHNIQUE: CT scan of the abdomen and pelvis was performed following  injection of IV contrast. Multiplanar reformats were obtained. Dose  reduction techniques were used.  CONTRAST: 98 mL Isovue-370.     COMPARISON: None.     FINDINGS:   LOWER CHEST: Normal.     HEPATOBILIARY: Cholelithiasis. The gallbladder is moderately  distended. No gallbladder wall thickening or pericholecystic fluid.     PANCREAS: Normal.     SPLEEN: Normal.     ADRENAL GLANDS: Normal.     KIDNEYS/BLADDER: Normal.     BOWEL: No obstruction or inflammatory changes.     PELVIC ORGANS: Normal.     ADDITIONAL FINDINGS: Moderate atherosclerotic calcification. No  abdominal aortic aneurysm.     MUSCULOSKELETAL: Normal.                                                                      IMPRESSION:   1.  Cholelithiasis without signs of cholecystitis.  2.  Otherwise unremarkable CT of the abdomen and pelvis.     AIDAN SALAZAR MD              Again, thank you for allowing me to participate in the care of your patient.        Sincerely,        Ellis Dacosta DO

## 2022-03-08 NOTE — PATIENT INSTRUCTIONS
Consistent with lateral hip source, soft tissue.  No joint concerns based on exam today, as well as with past imaging reviewed.  May use icing, heat, over-the-counter medication as needed for soreness.  Inquire about topical treatments.  We discussed potential use of Voltaren gel topically.  Advise rehab approach next.  Home exercises reviewed today.  Physical therapy also offered, and you may contact clinic if interested in PT referral.  Plan to monitor over the next few weeks to start.  Anticipate improvement during that time.  If persistent issues, likely physical therapy next.  We discussed consideration of additional imaging of the affected area with MRI, but this is not required currently given assessment and plan for other intervention.  Additionally, we briefly discussed consideration of lateral hip injection, but deferred for now given acute issue, also without reproducible tenderness, and with lack of interest in injection currently.    If you have any further questions for your physician or physician s care team you can call [phone] and use option 3 to leave a voice message. Calls received during business hours will be returned same day.

## 2022-03-08 NOTE — PROGRESS NOTES
ASSESSMENT & PLAN    Markel was seen today for pain.    Diagnoses and all orders for this visit:    Lateral pain of right hip      Suspect from change in walking surface, though walking in general is not new for him.      See Patient Instructions  Patient Instructions   Consistent with lateral hip source, soft tissue.  No joint concerns based on exam today, as well as with past imaging reviewed.  May use icing, heat, over-the-counter medication as needed for soreness.  Inquire about topical treatments.  We discussed potential use of Voltaren gel topically.  Advise rehab approach next.  Home exercises reviewed today.  Physical therapy also offered, and you may contact clinic if interested in PT referral.  Plan to monitor over the next few weeks to start.  Anticipate improvement during that time.  If persistent issues, likely physical therapy next.  We discussed consideration of additional imaging of the affected area with MRI, but this is not required currently given assessment and plan for other intervention.  Additionally, we briefly discussed consideration of lateral hip injection, but deferred for now given acute issue, also without reproducible tenderness, and with lack of interest in injection currently.    If you have any further questions for your physician or physician s care team you can call [phone] and use option 3 to leave a voice message. Calls received during business hours will be returned same day.        [facility] D[facility]    -----  Chief Complaint   Patient presents with     Right Hip - Pain       SUBJECTIVE  Markel Padilla is a/an 81 year old male who is seen as a self referral for evaluation of right lateral hip pain, which has been present for about 1 weeks.  Denies any groin pain .     Had been walking on an incline on a treadmill.      The patient is seen with their wife.      Onset: 1 week(s) ago. Reports insidious onset without  "acute precipitating event.  Location of Pain: right lateral hip and thigh   Worsened by: stairs, laying down    Better with: rest   Treatments tried: ice and heat, analgesics,   Associated symptoms: no distal numbness or tingling; denies swelling or warmth    Orthopedic/Surgical history: NO  Social History/Occupation: denies     No family history pertinent to patient's problem today.     **  Treadmill this winter.  Then went for walk outside and noted pain. However, same distance, and no injury. But, was on snowy surface, uneven terrain.      REVIEW OF SYSTEMS:  Review of Systems      OBJECTIVE:  BP (!) 142/58   Ht 1.676 m (5' 6\")   Wt 80.7 kg (178 lb)   BMI 28.73 kg/m     General: healthy, alert and in no distress  HEENT: no scleral icterus or conjunctival erythema  Skin: no suspicious lesions or rash. No jaundice.  CV: distal perfusion intact   Resp: normal respiratory effort without conversational dyspnea   Psych: normal mood and affect  Gait: ambulates independently  Neuro: Normal light sensory exam of  extremity       Right hip exam    Inspection:      no edema or ecchymosis in hip area    ROM:     Grossly full active and passive ROM, no pain with motion    Strength: no pain supine hip abduction, adduction; some lateral pain mild with resisted abduction    Tender: none focal    Non Tender:      greater trochanter    Sensation:      grossly intact in hip and thigh    Special Tests:      neg (-) FADIR       Some lateral pain with Kenn       Log roll neg        RADIOLOGY:  I independently visualized and reviewed these images with the patient  There appears to be slight arthrosis right hip with mild spurring, otherwise joint space appears preserved. No concerning abnormality greater trochanter.    Narrative & Impression   CT ABDOMEN/PELVIS WITH CONTRAST January 11, 2022 1:29 PM     CLINICAL HISTORY: Abdominal pain, acute, nonlocalized. Abdominal pain,  epigastric.     TECHNIQUE: CT scan of the abdomen and pelvis " was performed following  injection of IV contrast. Multiplanar reformats were obtained. Dose  reduction techniques were used.  CONTRAST: 98 mL Isovue-370.     COMPARISON: None.     FINDINGS:   LOWER CHEST: Normal.     HEPATOBILIARY: Cholelithiasis. The gallbladder is moderately  distended. No gallbladder wall thickening or pericholecystic fluid.     PANCREAS: Normal.     SPLEEN: Normal.     ADRENAL GLANDS: Normal.     KIDNEYS/BLADDER: Normal.     BOWEL: No obstruction or inflammatory changes.     PELVIC ORGANS: Normal.     ADDITIONAL FINDINGS: Moderate atherosclerotic calcification. No  abdominal aortic aneurysm.     MUSCULOSKELETAL: Normal.                                                                      IMPRESSION:   1.  Cholelithiasis without signs of cholecystitis.  2.  Otherwise unremarkable CT of the abdomen and pelvis.     AIDAN SALAZAR MD

## 2022-03-09 ENCOUNTER — ANCILLARY PROCEDURE (OUTPATIENT)
Dept: GENERAL RADIOLOGY | Facility: CLINIC | Age: 82
End: 2022-03-09
Attending: PEDIATRICS
Payer: COMMERCIAL

## 2022-03-09 ENCOUNTER — TELEPHONE (OUTPATIENT)
Dept: ORTHOPEDICS | Facility: CLINIC | Age: 82
End: 2022-03-09
Payer: MEDICARE

## 2022-03-09 ENCOUNTER — OFFICE VISIT (OUTPATIENT)
Dept: ORTHOPEDICS | Facility: CLINIC | Age: 82
End: 2022-03-09
Payer: COMMERCIAL

## 2022-03-09 VITALS
SYSTOLIC BLOOD PRESSURE: 138 MMHG | BODY MASS INDEX: 28.61 KG/M2 | DIASTOLIC BLOOD PRESSURE: 60 MMHG | HEIGHT: 66 IN | WEIGHT: 178 LBS

## 2022-03-09 DIAGNOSIS — M25.551 LATERAL PAIN OF RIGHT HIP: Primary | ICD-10-CM

## 2022-03-09 DIAGNOSIS — M25.551 LATERAL PAIN OF RIGHT HIP: ICD-10-CM

## 2022-03-09 PROCEDURE — 20610 DRAIN/INJ JOINT/BURSA W/O US: CPT | Mod: RT | Performed by: PEDIATRICS

## 2022-03-09 PROCEDURE — 73502 X-RAY EXAM HIP UNI 2-3 VIEWS: CPT | Mod: RT | Performed by: RADIOLOGY

## 2022-03-09 PROCEDURE — 99212 OFFICE O/P EST SF 10 MIN: CPT | Mod: 25 | Performed by: PEDIATRICS

## 2022-03-09 RX ADMIN — LIDOCAINE HYDROCHLORIDE 2 ML: 10 INJECTION, SOLUTION INFILTRATION; PERINEURAL at 10:20

## 2022-03-09 RX ADMIN — TRIAMCINOLONE ACETONIDE 40 MG: 40 INJECTION, SUSPENSION INTRA-ARTICULAR; INTRAMUSCULAR at 10:20

## 2022-03-09 NOTE — TELEPHONE ENCOUNTER
Called and spoke with patient.  Increase in pain in his lateral hip since being seen yesterday.  Has tried the voltaren, but states it has not been helpful.  Pain with standing.   Offered a referral to PT vs follow up for a hip x ray.  He would like a hip x ray. Appointment made for today 3/9/22.   Marlena Villanueva MS ATC

## 2022-03-09 NOTE — PROGRESS NOTES
ASSESSMENT & PLAN    Markel was seen today for recheck.    Diagnoses and all orders for this visit:    Lateral pain of right hip  -     XR Pelvis w Hip Right 1 View; Future  -     Large Joint Injection/Arthocentesis: R greater trochanteric bursa      Sounds like aggravation by initial history, but on reassessing and discussing further, no significant interval change, particularly in exam.    Hopefully injection will be of benefit. If not desired benefit, then consider imaging with MRI, PT.  Questions answered. Discussed signs and symptoms that may indicate more serious issues; the patient was instructed to seek appropriate care if noted. Markel indicates understanding of these issues and agrees with the plan.        See Patient Instructions  Patient Instructions   X-ray done today is reassuring.  This remains consistent with soft tissue source.  Right lateral hip steroid injection done today.  Monitor with injection over the next 1 to 2 weeks, and continue with home exercises during that time.  Contact clinic in the meantime if any questions or concerns.    If you have any further questions for your physician or physician s care team you can call [phone] and use option 3 to leave a voice message. Calls received during business hours will be returned same day.        Injection Discharge Instructions      You may shower, however avoid swimming, tub baths or hot tubs for 24 hours following your procedure    You may have a mild to moderate increase in pain for several days following the injection.    It may take up to 14 days for the steroid medication to start working although you may feel the effect as early as a few days after the procedure.    You may use ice packs for 10-15 minutes, 3 to 4 times a day at the injection site for comfort    You may use anti-inflammatory medications (such as Ibuprofen or Aleve or Advil) if you are able to take safely, or acetaminophen (Tylenol) for pain control if  "necessary    If you were fasting, you may resume your normal diet and medications after the procedure    If you have diabetes, check your blood sugar more frequently than usual as your blood sugar may be higher than normal for 10-14 days following a steroid injection. Contact your doctor who manages your diabetes if your blood sugar is higher than usual      If you experience any of the following, call the clinic @ [phone]  - Fever over 100 degree F  - Swelling, bleeding, redness, drainage, warmth at the injection site  - New or worsening pain          [facility] Dacosta [facility]    SUBJECTIVE- Interim History March 9, 2022    Chief Complaint   Patient presents with     Right Hip - RECHECK       Markel Padilla is a 81 year old male who is seen in f/u up for Lateral pain of right hip. Since last visit on 3/8/22 patient has had an increase in pain in his right hip since he was seen.  This morning had an increase in pain while standing.  Pain continues over the lateral aspect of his hip.    Pain with stairs.  Pain getting his leg up to get into the shower.        REVIEW OF SYSTEMS:  Review of Systems      OBJECTIVE:  /60   Ht 1.676 m (5' 6\")   Wt 80.7 kg (178 lb)   BMI 28.73 kg/m       GENERAL APPEARANCE: alert and no distress   GAIT: ambulates independently, able to get onto and off exam table, some discomfort with getting onto step, and with walking  SKIN: no suspicious lesions or rashes hip area    Right hip:  Mild tenderness greater trochanter, proximal IT band.  Log roll neg  FADIR with lateral hip discomfort  Kenn with lateral pain      RADIOLOGY:  Final results and radiologist's interpretation, available in the [facility] health record.  Images were reviewed with the patient in the office today.  My personal interpretation of the performed imaging: no acute bony abnormality. No concerns about the greater trochanter on right. Hip joint spaces fairly well " preserved.    XR Pelvis w Hip Right 1 View    Narrative    XR PELVIS AND HIP RIGHT 1 VIEW 3/9/2022 9:36 AM     HISTORY: Lateral pain of right hip    COMPARISON: None.      Impression    IMPRESSION: Minimal hypertrophic change in both hip joints without  joint space narrowing. No fracture or osseous lesion. Atheromatous  calcification.    ELLIS MOORE MD         SYSTEM ID:  [id number]         Large Joint Injection/Arthocentesis: R greater trochanteric bursa    Date/Time: 3/9/2022 10:20 AM  Performed by: Ellis Dacosta DO  Authorized by: Ellis Dacosta DO     Indications:  Pain and osteoarthritis  Needle Size:  25 G  Guidance: landmark guided    Approach:  Lateral  Location:  Hip      Site:  R greater trochanteric bursa  Medications:  2 mL lidocaine 1 %; 40 mg triamcinolone 40 MG/ML  Outcome:  Tolerated well, no immediate complications  Procedure discussed: discussed risks, benefits, and alternatives    Consent Given by:  Patient  Timeout: timeout called immediately prior to procedure    Prep: patient was prepped and draped in usual sterile fashion            10 minutes spent on the date of the encounter doing chart review, history and exam, documentation and further activities per the note, not including injection

## 2022-03-09 NOTE — LETTER
3/9/2022         RE: Markel Padilla  [address] [place] [address]        Dear Colleague,    Thank you for referring your patient, Markel Padilla, to the [facility]. Please see a copy of my visit note below.    ASSESSMENT & PLAN    Markel was seen today for recheck.    Diagnoses and all orders for this visit:    Lateral pain of right hip  -     XR Pelvis w Hip Right 1 View; Future  -     Large Joint Injection/Arthocentesis: R greater trochanteric bursa      Sounds like aggravation by initial history, but on reassessing and discussing further, no significant interval change, particularly in exam.    Hopefully injection will be of benefit. If not desired benefit, then consider imaging with MRI, PT.  Questions answered. Discussed signs and symptoms that may indicate more serious issues; the patient was instructed to seek appropriate care if noted. Markel indicates understanding of these issues and agrees with the plan.        See Patient Instructions  Patient Instructions   X-ray done today is reassuring.  This remains consistent with soft tissue source.  Right lateral hip steroid injection done today.  Monitor with injection over the next 1 to 2 weeks, and continue with home exercises during that time.  Contact clinic in the meantime if any questions or concerns.    If you have any further questions for your physician or physician s care team you can call [phone] and use option 3 to leave a voice message. Calls received during business hours will be returned same day.        Injection Discharge Instructions      You may shower, however avoid swimming, tub baths or hot tubs for 24 hours following your procedure    You may have a mild to moderate increase in pain for several days following the injection.    It may take up to 14 days for the steroid medication to start working although you may feel the effect as early as a few days after the  "procedure.    You may use ice packs for 10-15 minutes, 3 to 4 times a day at the injection site for comfort    You may use anti-inflammatory medications (such as Ibuprofen or Aleve or Advil) if you are able to take safely, or acetaminophen (Tylenol) for pain control if necessary    If you were fasting, you may resume your normal diet and medications after the procedure    If you have diabetes, check your blood sugar more frequently than usual as your blood sugar may be higher than normal for 10-14 days following a steroid injection. Contact your doctor who manages your diabetes if your blood sugar is higher than usual      If you experience any of the following, call the clinic @ [phone]  - Fever over 100 degree F  - Swelling, bleeding, redness, drainage, warmth at the injection site  - New or worsening pain          Ellis Dacosta [facility]    SUBJECTIVE- Interim History March 9, 2022    Chief Complaint   Patient presents with     Right Hip - RECHECK       Markel Padilla is a 81 year old male who is seen in f/u up for Lateral pain of right hip. Since last visit on 3/8/22 patient has had an increase in pain in his right hip since he was seen.  This morning had an increase in pain while standing.  Pain continues over the lateral aspect of his hip.    Pain with stairs.  Pain getting his leg up to get into the shower.        REVIEW OF SYSTEMS:  Review of Systems      OBJECTIVE:  /60   Ht 1.676 m (5' 6\")   Wt 80.7 kg (178 lb)   BMI 28.73 kg/m       GENERAL APPEARANCE: alert and no distress   GAIT: ambulates independently, able to get onto and off exam table, some discomfort with getting onto step, and with walking  SKIN: no suspicious lesions or rashes hip area    Right hip:  Mild tenderness greater trochanter, proximal IT band.  Log roll neg  FADIR with lateral hip discomfort  Kenn with lateral pain      RADIOLOGY:  Final results and radiologist's " interpretation, available in the [facility] health record.  Images were reviewed with the patient in the office today.  My personal interpretation of the performed imaging: no acute bony abnormality. No concerns about the greater trochanter on right. Hip joint spaces fairly well preserved.    XR Pelvis w Hip Right 1 View    Narrative    XR PELVIS AND HIP RIGHT 1 VIEW 3/9/2022 9:36 AM     HISTORY: Lateral pain of right hip    COMPARISON: None.      Impression    IMPRESSION: Minimal hypertrophic change in both hip joints without  joint space narrowing. No fracture or osseous lesion. Atheromatous  calcification.    ELLIS MOORE MD         SYSTEM ID:  [id number]         Large Joint Injection/Arthocentesis: R greater trochanteric bursa    Date/Time: 3/9/2022 10:20 AM  Performed by: Ellis Dacosta DO  Authorized by: Ellis Dacosta DO     Indications:  Pain and osteoarthritis  Needle Size:  25 G  Guidance: landmark guided    Approach:  Lateral  Location:  Hip      Site:  R greater trochanteric bursa  Medications:  2 mL lidocaine 1 %; 40 mg triamcinolone 40 MG/ML  Outcome:  Tolerated well, no immediate complications  Procedure discussed: discussed risks, benefits, and alternatives    Consent Given by:  Patient  Timeout: timeout called immediately prior to procedure    Prep: patient was prepped and draped in usual sterile fashion            10 minutes spent on the date of the encounter doing chart review, history and exam, documentation and further activities per the note, not including injection             Again, thank you for allowing me to participate in the care of your patient.        Sincerely,        Ellis Dacosta DO

## 2022-03-09 NOTE — PATIENT INSTRUCTIONS
X-ray done today is reassuring.  This remains consistent with soft tissue source.  Right lateral hip steroid injection done today.  Monitor with injection over the next 1 to 2 weeks, and continue with home exercises during that time.  Contact clinic in the meantime if any questions or concerns.    If you have any further questions for your physician or physician s care team you can call [phone] and use option 3 to leave a voice message. Calls received during business hours will be returned same day.        Injection Discharge Instructions      You may shower, however avoid swimming, tub baths or hot tubs for 24 hours following your procedure    You may have a mild to moderate increase in pain for several days following the injection.    It may take up to 14 days for the steroid medication to start working although you may feel the effect as early as a few days after the procedure.    You may use ice packs for 10-15 minutes, 3 to 4 times a day at the injection site for comfort    You may use anti-inflammatory medications (such as Ibuprofen or Aleve or Advil) if you are able to take safely, or acetaminophen (Tylenol) for pain control if necessary    If you were fasting, you may resume your normal diet and medications after the procedure    If you have diabetes, check your blood sugar more frequently than usual as your blood sugar may be higher than normal for 10-14 days following a steroid injection. Contact your doctor who manages your diabetes if your blood sugar is higher than usual      If you experience any of the following, call the clinic @ [phone]  - Fever over 100 degree F  - Swelling, bleeding, redness, drainage, warmth at the injection site  - New or worsening pain

## 2022-03-10 RX ORDER — TRIAMCINOLONE ACETONIDE 40 MG/ML
40 INJECTION, SUSPENSION INTRA-ARTICULAR; INTRAMUSCULAR
Status: DISCONTINUED | OUTPATIENT
Start: 2022-03-09 | End: 2022-03-25

## 2022-03-10 RX ORDER — LIDOCAINE HYDROCHLORIDE 10 MG/ML
2 INJECTION, SOLUTION INFILTRATION; PERINEURAL
Status: DISCONTINUED | OUTPATIENT
Start: 2022-03-09 | End: 2022-03-25

## 2022-03-16 ENCOUNTER — TELEPHONE (OUTPATIENT)
Dept: ORTHOPEDICS | Facility: CLINIC | Age: 82
End: 2022-03-16
Payer: COMMERCIAL

## 2022-03-16 DIAGNOSIS — M25.551 POSTERIOR PAIN OF HIP, RIGHT: Primary | ICD-10-CM

## 2022-03-16 DIAGNOSIS — M54.16 LUMBAR RADICULOPATHY: ICD-10-CM

## 2022-03-16 NOTE — TELEPHONE ENCOUNTER
Pt says he is still having leg pain. Would like a call back to discuss next steps.     [phone]    Thuy

## 2022-03-16 NOTE — TELEPHONE ENCOUNTER
10/13/21               To Whom It May Concern:    This is to certify Quintin Monge and Doreen Monge was evaluated with Virginia Brown MD on 10/13/21 and can return to school as appropriate. Please excuse mother from work as well as she accompanied them to appointment.     RESTRICTIONS: none              Virginia Brown MD  [facility]  [address] [address]  Phone: [phone]     Called patient, states he is continuing to have pain in his right leg.  He is c/o numbness that starts posterior right knee and goes into his foot.  Next steps included MRI or physical therapy, Markel would like an MRI as his next steps.     Sandy Gardiner ATC, CSCS

## 2022-03-16 NOTE — TELEPHONE ENCOUNTER
Patient called again and wants to know when order for MRI will be put in. Would like a call back asap.     [phone]    Thuy

## 2022-03-17 NOTE — TELEPHONE ENCOUNTER
Patient is calling back again this morning wondering about his MRI.  He states he is in a lot of pain and was hoping to get a call back this morning sometime, if possible.  His best call back is [phone].

## 2022-03-17 NOTE — TELEPHONE ENCOUNTER
Please clarify.  Last discussion was about the hip area. Based on note below, this now sounds more radicular in nature, with numbness in right LE.  Still with hip pain? Any back pain? Any numbness proximal to knee?  Also, any benefit at all from the hip injection?  If he is having back pain with the leg numbness, or more radicular symptoms, then I would obtain lumbar MRI. Otherwise, if still primarily focal lateral hip pain, would get hip MRI.  Ultimately, may need to get both, but would prefer to order just one test if able.  When clarified, will sign MRI order.  Thanks.  Ellis Dacosta, , CAQ

## 2022-03-18 NOTE — TELEPHONE ENCOUNTER
"Patient states that his pain is \"directly in his butt cheek.\"  He states that at times his pain is shooting into the gastrocnemius and can go into his foot and toes.  When this happens he cannot stand and needs to find a place to sit. He does indicate numbness in his foot periodically.  He is requesting MRI.  Orders will be placed for MRI pending direction of Dr. Dacosta.     Sandy Gardiner ATC, CSCS       "

## 2022-03-18 NOTE — TELEPHONE ENCOUNTER
Signed MRI lumbar order. Sounds like lumbar radiculopathy.  Plan to contact pt with results.  Ellis Dacosta DO, CAQ

## 2022-03-21 ENCOUNTER — HOSPITAL ENCOUNTER (OUTPATIENT)
Dept: MRI IMAGING | Facility: CLINIC | Age: 82
Discharge: HOME | End: 2022-03-21
Attending: PEDIATRICS | Admitting: PEDIATRICS
Payer: COMMERCIAL

## 2022-03-21 DIAGNOSIS — M54.16 LUMBAR RADICULOPATHY: ICD-10-CM

## 2022-03-21 DIAGNOSIS — M25.551 POSTERIOR PAIN OF HIP, RIGHT: ICD-10-CM

## 2022-03-21 PROCEDURE — 72148 MRI LUMBAR SPINE W/O DYE: CPT

## 2022-03-22 ENCOUNTER — TELEPHONE (OUTPATIENT)
Dept: ORTHOPEDICS | Facility: CLINIC | Age: 82
End: 2022-03-22
Payer: COMMERCIAL

## 2022-03-25 ENCOUNTER — OFFICE VISIT (OUTPATIENT)
Dept: ORTHOPEDICS | Facility: CLINIC | Age: 82
End: 2022-03-25
Payer: COMMERCIAL

## 2022-03-25 VITALS
WEIGHT: 178 LBS | BODY MASS INDEX: 28.61 KG/M2 | HEIGHT: 66 IN | DIASTOLIC BLOOD PRESSURE: 70 MMHG | SYSTOLIC BLOOD PRESSURE: 146 MMHG

## 2022-03-25 DIAGNOSIS — M25.551 POSTERIOR PAIN OF HIP, RIGHT: Primary | ICD-10-CM

## 2022-03-25 DIAGNOSIS — M54.16 LUMBAR RADICULOPATHY: ICD-10-CM

## 2022-03-25 PROCEDURE — 99213 OFFICE O/P EST LOW 20 MIN: CPT | Performed by: PEDIATRICS

## 2022-03-25 NOTE — LETTER
3/25/2022         RE: Markel Padilla  [address] [place] [address]        Dear Colleague,    Thank you for referring your patient, Markel Padilla, to the [facility]. Please see a copy of my visit note below.    ASSESSMENT & PLAN    Markel was seen today for recheck.    Diagnoses and all orders for this visit:    Posterior pain of hip, right    Lumbar radiculopathy  Comments:  right  Orders:  -     Pain Management Referral; Future      Consistent with right lumbar radiculopathy; symptoms have evolved since onset.  We discussed the following: symptom treatment, activity modification/rest, imaging, rehab, injection therapy, medication and referral to spine. Following discussion, plan:  CHANNING next.  Questions answered. Discussed signs and symptoms that may indicate more serious issues; the patient was instructed to seek appropriate care if noted. Markel indicates understanding of these issues and agrees with the plan.        See Patient Instructions  Patient Instructions   MRI of the low back shows evidence of where a nerve would be getting pinched in the spine, particularly around the right L4-L5 level.  Plan injection for this.  Referral placed to pain management for an epidural steroid injection.  We discussed additional considerations include physical therapy, use of medication, and less likely but possible future referral on to see a spine surgeon if other intervention fails.  Request you contact clinic with an update about 2 to 3 weeks after the injection (it is fine to call the clinic and leave a message).  Otherwise, contact clinic if any other questions or concerns.    If you have any further questions for your physician or physician s care team you can call [phone] and use option 3 to leave a voice message. Calls received during business hours will be returned same day.        Ellis Dacosta,   [facility]  "CATE    SUBJECTIVE- Interim History March 25, 2022    Chief Complaint   Patient presents with     Right Hip - RECHECK       Markel Padilla is a 81 year old male who is seen in f/u up for    Posterior pain of hip, right  Lumbar radiculopathy. Since last visit on 3/09/22 patient has undergone an MRI of his low back.   Continues to have pain in his posterior hip.  Pain will travel to his ankle.  Occasionally has the feeling of his leg \"falling asleep\".  Difficult to stand.  - Now ~ 4 weeks from initial onset       REVIEW OF SYSTEMS:  Review of Systems      OBJECTIVE:  BP (!) 146/70   Ht 1.676 m (5' 6\")   Wt 80.7 kg (178 lb)   BMI 28.73 kg/m       GENERAL APPEARANCE: healthy, alert and no distress   GAIT: ambulates independently    Slump with right posterior hip pain, radiating to right LE      RADIOLOGY:  Final results and radiologist's interpretation, available in the [facility] health record.  Images were reviewed with the patient in the office today.  My personal interpretation of the performed imaging: multilevel degenerative change; most prominent right-sided findings are at the L4-L5 level.    Results for orders placed or performed during the hospital encounter of 03/21/22   MRI Lumbar spine w/o contrast    Narrative    MRI LUMBAR SPINE WITHOUT CONTRAST March 21, 2022 2:58 PM     HISTORY: Concern for lumbar radiculopathy. Right posterior hip pain  with radiation. Posterior pain of hip, right. Lumbar radiculopathy.     TECHNIQUE: Multiplanar multisequence MRI of the lumbar spine without  contrast.     COMPARISON: None available. Correlation made with CT abdomen and  pelvis 1/11/2022.    FINDINGS: Nomenclature is based on five lumbar vertebral bodies.  Normal vertebral body heights. Retrolisthesis of L2 on L3 measuring 4  mm. Retrolisthesis of L3 on L4 measuring 3 mm. Anterolisthesis of L4  on L5 measuring 4-5 mm. Anterolisthesis of L5 on S1 measuring 3 mm.  Exaggerated lumbar lordosis. Mild dextroconvex " curvature centered in  the upper lumbar spine. Somewhat diffusely heterogeneous bone marrow  signal, but overall appearing benign. Mild Modic type I degenerative  endplate signal changes anteriorly at T12-L1, along the left posterior  aspect of the L3-L4 disc space, and along the right posterior aspect  of the L4-L5 disc space. Diffuse intervertebral disc desiccation.  Normal appearance of the distal spinal cord with the conus terminating  at L1. The paraspinal soft tissues and visualized aspects of the bony  pelvis are unremarkable.    Segmental analysis:  T12-L1: Minimal disc height loss. Shallow symmetric disc bulge. Normal  facets. No spinal canal stenosis. No significant neural foraminal  stenosis.    L1-L2: Mild disc height loss. No herniation. Normal facets. No spinal  canal or neural foraminal stenosis.    L2-L3: Moderate to severe disc height loss. Symmetric diffuse disc  bulge with posterior endplate osteophytic ridging. Mild facet  arthropathy. Mild spinal canal stenosis. Mild bilateral neural  foraminal stenosis.    L3-L4: Minimal disc height loss. Symmetric disc bulge with posterior  endplate osteophytic ridging. Mild to moderate facet arthropathy.  Ligamentum flavum thickening. Mild central spinal canal stenosis with  mild to moderate bilateral lateral recess stenosis. Mild to moderate  left and mild right neural foraminal stenosis.    L4-L5: Minimal disc height loss. Symmetric disc bulge with  posterolateral marginal endplate osteophytes. There is a superimposed  cranially migrating right far lateral disc extrusion (series 6 image  36). Moderate bilateral facet arthropathy. Ligamentum flavum  thickening. Moderate central spinal canal stenosis with moderate to  marked bilateral lateral recess stenosis. There is moderate to severe  right neural foraminal stenosis with mass effect on the exiting right  L4 nerve roots. There is mild left neural foraminal stenosis.    L5-S1: Mild disc height loss.  Symmetric disc bulge. Moderate bordering  on moderate to severe facet arthropathy. No significant spinal canal  stenosis. Mild/mild to moderate bilateral neural foraminal stenosis.       Impression    IMPRESSION:  1. 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  2. Moderate central spinal canal stenosis with moderate to marked  bilateral lateral recess narrowing at L4-L5.  3. Cranially migrating right far lateral disc extrusion at L4-L5  contributing to moderate to severe right neural foraminal stenosis  with mass effect on the exiting right L4 nerve root. Correlate  clinically for possible right L4 radiculopathy. Lesser degrees of  neural foraminal narrowing elsewhere, as described.  4. Mild multilevel degenerative spondylolisthesis.    RODRICK HODGES MD         SYSTEM ID:  [id number]             Again, thank you for allowing me to participate in the care of your patient.        Sincerely,        Ellis Dacosta,

## 2022-03-25 NOTE — PROGRESS NOTES
ASSESSMENT & PLAN    Markel was seen today for recheck.    Diagnoses and all orders for this visit:    Posterior pain of hip, right    Lumbar radiculopathy  Comments:  right  Orders:  -     Pain Management Referral; Future      Consistent with right lumbar radiculopathy; symptoms have evolved since onset.  We discussed the following: symptom treatment, activity modification/rest, imaging, rehab, injection therapy, medication and referral to spine. Following discussion, plan:  CHANNING next.  Questions answered. Discussed signs and symptoms that may indicate more serious issues; the patient was instructed to seek appropriate care if noted. Markel indicates understanding of these issues and agrees with the plan.        See Patient Instructions  Patient Instructions   MRI of the low back shows evidence of where a nerve would be getting pinched in the spine, particularly around the right L4-L5 level.  Plan injection for this.  Referral placed to pain management for an epidural steroid injection.  We discussed additional considerations include physical therapy, use of medication, and less likely but possible future referral on to see a spine surgeon if other intervention fails.  Request you contact clinic with an update about 2 to 3 weeks after the injection (it is fine to call the clinic and leave a message).  Otherwise, contact clinic if any other questions or concerns.    If you have any further questions for your physician or physician s care team you can call [phone] and use option 3 to leave a voice message. Calls received during business hours will be returned same day.        Ellis Dacosta [facility]    SUBJECTIVE- Interim History March 25, 2022    Chief Complaint   Patient presents with     Right Hip - RECHECK       Markel Padilla is a 81 year old male who is seen in f/u up for    Posterior pain of hip, right  Lumbar radiculopathy. Since last visit on  "3/09/22 patient has undergone an MRI of his low back.   Continues to have pain in his posterior hip.  Pain will travel to his ankle.  Occasionally has the feeling of his leg \"falling asleep\".  Difficult to stand.  - Now ~ 4 weeks from initial onset       REVIEW OF SYSTEMS:  Review of Systems      OBJECTIVE:  BP (!) 146/70   Ht 1.676 m (5' 6\")   Wt 80.7 kg (178 lb)   BMI 28.73 kg/m       GENERAL APPEARANCE: healthy, alert and no distress   GAIT: ambulates independently    Slump with right posterior hip pain, radiating to right LE      RADIOLOGY:  Final results and radiologist's interpretation, available in the [facility] health record.  Images were reviewed with the patient in the office today.  My personal interpretation of the performed imaging: multilevel degenerative change; most prominent right-sided findings are at the L4-L5 level.    Results for orders placed or performed during the hospital encounter of 03/21/22   MRI Lumbar spine w/o contrast    Narrative    MRI LUMBAR SPINE WITHOUT CONTRAST March 21, 2022 2:58 PM     HISTORY: Concern for lumbar radiculopathy. Right posterior hip pain  with radiation. Posterior pain of hip, right. Lumbar radiculopathy.     TECHNIQUE: Multiplanar multisequence MRI of the lumbar spine without  contrast.     COMPARISON: None available. Correlation made with CT abdomen and  pelvis 1/11/2022.    FINDINGS: Nomenclature is based on five lumbar vertebral bodies.  Normal vertebral body heights. Retrolisthesis of L2 on L3 measuring 4  mm. Retrolisthesis of L3 on L4 measuring 3 mm. Anterolisthesis of L4  on L5 measuring 4-5 mm. Anterolisthesis of L5 on S1 measuring 3 mm.  Exaggerated lumbar lordosis. Mild dextroconvex curvature centered in  the upper lumbar spine. Somewhat diffusely heterogeneous bone marrow  signal, but overall appearing benign. Mild Modic type I degenerative  endplate signal changes anteriorly at T12-L1, along the left posterior  aspect of the L3-L4 disc space, and " along the right posterior aspect  of the L4-L5 disc space. Diffuse intervertebral disc desiccation.  Normal appearance of the distal spinal cord with the conus terminating  at L1. The paraspinal soft tissues and visualized aspects of the bony  pelvis are unremarkable.    Segmental analysis:  T12-L1: Minimal disc height loss. Shallow symmetric disc bulge. Normal  facets. No spinal canal stenosis. No significant neural foraminal  stenosis.    L1-L2: Mild disc height loss. No herniation. Normal facets. No spinal  canal or neural foraminal stenosis.    L2-L3: Moderate to severe disc height loss. Symmetric diffuse disc  bulge with posterior endplate osteophytic ridging. Mild facet  arthropathy. Mild spinal canal stenosis. Mild bilateral neural  foraminal stenosis.    L3-L4: Minimal disc height loss. Symmetric disc bulge with posterior  endplate osteophytic ridging. Mild to moderate facet arthropathy.  Ligamentum flavum thickening. Mild central spinal canal stenosis with  mild to moderate bilateral lateral recess stenosis. Mild to moderate  left and mild right neural foraminal stenosis.    L4-L5: Minimal disc height loss. Symmetric disc bulge with  posterolateral marginal endplate osteophytes. There is a superimposed  cranially migrating right far lateral disc extrusion (series 6 image  36). Moderate bilateral facet arthropathy. Ligamentum flavum  thickening. Moderate central spinal canal stenosis with moderate to  marked bilateral lateral recess stenosis. There is moderate to severe  right neural foraminal stenosis with mass effect on the exiting right  L4 nerve roots. There is mild left neural foraminal stenosis.    L5-S1: Mild disc height loss. Symmetric disc bulge. Moderate bordering  on moderate to severe facet arthropathy. No significant spinal canal  stenosis. Mild/mild to moderate bilateral neural foraminal stenosis.       Impression    IMPRESSION:  1. 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  2. Moderate central spinal canal stenosis with moderate to marked  bilateral lateral recess narrowing at L4-L5.  3. Cranially migrating right far lateral disc extrusion at L4-L5  contributing to moderate to severe right neural foraminal stenosis  with mass effect on the exiting right L4 nerve root. Correlate  clinically for possible right L4 radiculopathy. Lesser degrees of  neural foraminal narrowing elsewhere, as described.  4. Mild multilevel degenerative spondylolisthesis.    RODRICK HODGES MD         SYSTEM ID:  [id number]

## 2022-03-25 NOTE — PATIENT INSTRUCTIONS
MRI of the low back shows evidence of where a nerve would be getting pinched in the spine, particularly around the right L4-L5 level.  Plan injection for this.  Referral placed to pain management for an epidural steroid injection.  We discussed additional considerations include physical therapy, use of medication, and less likely but possible future referral on to see a spine surgeon if other intervention fails.  Request you contact clinic with an update about 2 to 3 weeks after the injection (it is fine to call the clinic and leave a message).  Otherwise, contact clinic if any other questions or concerns.    If you have any further questions for your physician or physician s care team you can call [phone] and use option 3 to leave a voice message. Calls received during business hours will be returned same day.

## 2022-03-28 ENCOUNTER — TELEPHONE (OUTPATIENT)
Dept: PALLIATIVE MEDICINE | Facility: CLINIC | Age: 82
End: 2022-03-28
Payer: COMMERCIAL

## 2022-03-28 NOTE — TELEPHONE ENCOUNTER
Screening Questions for Radiology Injections:    Injection to be done at which interventional clinic site? [facility] [place]    Remind [place] Pt's that Covid test is no longer required except for sedation procedure Pt's.    Instruct patient to arrive as directed prior to the scheduled appointment time:    [facility]: 30 minutes before      [place]: 30 minutes before; if IV needed 1 hour before     Procedure ordered by [facility]    Procedure ordered? Lumbar CHANNING       Transforaminal Cervical CHANNING -  [place] does NOT have providers that do these- [facility] and [facility] do have providers that do    As a reminder, receiving steroids can decrease your body's ability to fight infection.   Would you still like to move forward with scheduling the injection?  Yes    What insurance would patient like us to bill for this procedure? [employer]      Worker's comp or MVA (motor vehicle accident) -Any injection DO NOT SCHEDULE and route to Angel Potter.      [employer] insurance - For SI joint injections, DO NOT SCHEDULE and route Rose Bull.       ALL BCBS, [employer] and [employer]-Route to Rose for review DO NOT SCHEDULE      IF SCHEDULING IN [place] AND NEEDS A PA, IT IS OKAY TO SCHEDULE. [place] HANDLES THEIR OWN PA'S AFTER THE PATIENT IS SCHEDULED. PLEASE SCHEDULE AT LEAST 1 WEEK OUT SO A PA CAN BE OBTAINED.    Any chance of pregnancy? NO   If YES, do NOT schedule and route to RN pool    Is an  needed? No     Patient has a drive home? (mandatory) YES: ok    Is patient taking any blood thinners (That is: Coumadin, Warfarin, Jantoven, Pradaxa Xarelto, Eliquis, Edoxaban, Enoxaparin, Lovenox, Heparin, Arixtra, Fondaparinux, or Fragmin? OR Antiplatelet medication such as Plavix, Brilinta, or Effient? )? No   If hold needed, do NOT schedule, route to RN pool     Is patient taking any aspirin products (includes Excedrin and Fiorinal)? No     If more than 325mg/day, OK to schedule; Instruct pt to  decrease to less than 325 mg for 7 days AND route to RN pool    For CERVICAL procedures, hold all aspirin products for 6 days.     Tell pt that if aspirin product is not held for 6 days, the procedure WILL BE cancelled.      Does the patient have a bleeding or clotting disorder? No     If YES, okay to schedule AND route to RN nurse pool    For any patients with platelet count <100, must be forwarded to provider    Any allergies to contrast dye, iodine, shellfish, or numbing and steroid medications? No    If YES, add allergy information to appointment notes AND route to the RN pool     If CHANNING and Contrast Dye / Iodine Allergy? DO NOT SCHEDULE, route to RN pool    Allergies: Nkda [no known drug allergies]     Is patient diabetic?  No  If YES, instruct them to bring their glucometer.    Does patient have an active infection or treated for one within the past week? No     Is patient currently taking any antibiotics?  No     For patients on chronic, preventative, or prophylactic antibiotics, procedures may be scheduled.     For patients on antibiotics for active or recent infection:antibiotic course must have been completed for 4 days    Is patient currently taking any steroid medications? (i.e. Prednisone, Medrol)  No     For patients on steroid medications, course must have been completed for 4 days    Is patient actively being treated for cancer or immunocompromised? No  If YES, do NOT schedule and route to RN pool     Are you able to get on and off an exam table with minimal or no assistance? No  If NO, do NOT schedule and route to RN pool    Are you able to roll over and lay on your stomach with minimal or no assistance? Yes  If NO, do NOT schedule and route to RN pool     Has the patient had a flu shot or any other vaccinations within 7 days before or after the procedure.  No     Have you recently had a COVID vaccine or have plans to get it in the near future? No    If yes, explain that for the vaccine to work best  they need to:       wait 1 week before and 1 week after getting Vaccine #1    wait 1 week before and 2 weeks after getting Vaccine #2    wait 1 week before and 2 weeks after getting Vaccine BOOSTER    If patient has concerns about the timing, send to RN pool     Does patient have an MRI/CT?  YES: MRI  Check Procedure Scheduling Grid to see if required.      Was the MRI done within the last 3 years?  Yes    If yes, where was the MRI done i.e.[facility], [facility], [facility], [facility] etc? [facility]      If no, do not schedule and route to RN pool    If MRI was not done at [facility], [facility] or [facility] do NOT schedule and route to RN pool.      If pt has an imaging disc, the injection MAY be scheduled but pt has to bring disc to appt.     If they show up without the disc the injection cannot be done    Procedure Specific Instructions:      If celiac plexus block, informed patient NPO for 6 hours and that it is okay to take medications with sips of water, especially blood pressure medications  Not Applicable         If this is for a cervical procedure, informed patient that aspirin needs to be held for 6 days.   Not Applicable      If IV needed:    Do not schedule procedures requiring IV placement in the first appointment of the day or first appointment after lunch. Do NOT schedule at 0745, 0815 or 1245. ok    Instructed pt to arrive 30 minutes early for IV start if required. (Check Procedure Scheduling Grid)  Not Applicable    Reminders:      If you are started on any steroids or antibiotics between now and your appointment, you must contact us because the procedure may need to be cancelled.  Yes      For all procedures except radiofrequency ablations (RFAs) and spinal cord stimulator (SCS) trials, informed patient:    IV sedation is not provided for this procedure.  If you feel that an oral anti-anxiety medication is needed, you can discuss this further with your referring provider or primary care  provider.  The Pain Clinic provider will discuss specifics of what the procedure includes at your appointment.  Most procedures last 10-20 minutes.  We use numbing medications to help with any discomfort during the procedure.  Not Applicable      For patients 85 or older we recommend having an adult stay w/ them for the remainder of the day.   ok    Does the patient have any questions?  NO  Yissel Potter  [facility]

## 2022-03-29 ENCOUNTER — RADIOLOGY INJECTION OFFICE VISIT (OUTPATIENT)
Dept: PALLIATIVE MEDICINE | Facility: CLINIC | Age: 82
End: 2022-03-29
Attending: PEDIATRICS
Payer: COMMERCIAL

## 2022-03-29 VITALS — OXYGEN SATURATION: 98 % | HEART RATE: 96 BPM | SYSTOLIC BLOOD PRESSURE: 177 MMHG | DIASTOLIC BLOOD PRESSURE: 80 MMHG

## 2022-03-29 DIAGNOSIS — M54.16 LUMBAR RADICULOPATHY: ICD-10-CM

## 2022-03-29 PROCEDURE — 62323 NJX INTERLAMINAR LMBR/SAC: CPT | Performed by: PAIN MEDICINE

## 2022-03-29 ASSESSMENT — PAIN SCALES - GENERAL: PAINLEVEL: WORST PAIN (10)

## 2022-03-29 NOTE — NURSING NOTE
Discharge Information    IV Discontiued Time:  NA    Amount of Fluid Infused:  NA    Discharge Criteria = When patient returns to baseline or as per MD order    Consciousness:  Pt is fully awake    Circulation:  BP +/- 20% of pre-procedure level    Respiration:  Patient is able to breathe deeply    O2 Sat:  Patient is able to maintain O2 Sat >92% on room air    Activity:  Moves 4 extremities on command    Ambulation:  Patient is able to stand and walk or stand and pivot into wheelchair    Dressing:  Clean/dry or No Dressing    Notes:   Discharge instructions and AVS given to patient    Patient meets criteria for discharge?  YES    Admitted to PCU?  No    Responsible adult present to accompany patient home?  Yes    Signature/Title:    dom sosa RN  RN Care Coordinator  [facility] [place]

## 2022-03-29 NOTE — NURSING NOTE
Pre-procedure Intake  If YES to any questions or NO to having a   Please complete laminated checklist and leave on the computer keyboard for Provider, verbally inform provider if able.    For SCS Trial, RFA's or any sedation procedure:  Have you been fasting? NA    If yes, for how long? NA    Are you taking any any blood thinners such as Coumadin, Warfarin, Jantoven, Pradaxa Xarelto, Eliquis, Edoxaban, Enoxaparin, Lovenox, Heparin, Arixtra, Fondaparinux, or Fragmin? OR Antiplatelet medication such as Plavix, Brilinta, or Effient?   No     If yes, when did you take your last dose? NA    Do you take aspirin?  No    If cervical procedure, have you held aspirin for 6 days?   NA    Do you have any allergies to contrast dye, iodine, steroid and/or numbing medications?  NO    Are you currently taking antibiotics or have an active infection?  NO    Have you had a fever/elevated temperature within the past week? NO    Are you currently taking oral steroids? NO    Do you have a ? Yes    Are you pregnant or breastfeeding?  Not Applicable    Have you received the COVID-19 vaccine? Yes    If yes, was it your 1st, 2nd or only dose needed? 3rd    Date of most recent vaccine: 10/12/21    Notify provider and RNs if systolic BP >170, diastolic BP >100, P >100 or O2 sats < 90%      Hawa Carter CMA (AAMA)

## 2022-03-29 NOTE — PATIENT INSTRUCTIONS
[facility]   Procedure Discharge Instructions    Today you saw:     Dr. Rich Cox      You had an:  Epidural steroid injection   -lumbar      Be cautious when walking. Numbness and/or weakness in the lower extremities may occur for up to 6-8 hours after the procedure due to effect of the local anesthetic    Do not drive for 6 hours. The effect of the local anesthetic could slow your reflexes.     You may resume your regular activities after 24 hours    Avoid strenuous activity for the first 24 hours    You may shower, however avoid swimming, tub baths or hot tubs for 24 hours following your procedure    You may have a mild to moderate increase in pain for several days following the injection.    It may take up to 14 days for the steroid medication to start working although you may feel the effect as early as a few days after the procedure.       You may use ice packs for 10-15 minutes, 3 to 4 times a day at the injection site for comfort    Do not use heat to painful areas for 6 to 8 hours. This will give the local anesthetic time to wear off and prevent you from accidentally burning your skin.     Unless you have been directed to avoid the use of anti-inflammatory medications (NSAIDS), you may use medications such as ibuprofen, Aleve or Tylenol for pain control if needed.     If you were fasting, you may resume your normal diet and medications after the procedure    If you have diabetes, check your blood sugar more frequently than usual as your blood sugar may be higher than normal for 10-14 days following a steroid injection. Contact your doctor who manages your diabetes if your blood sugar is higher than usual    Possible side effects of steroids that you may experience include flushing, elevated blood pressure, increased appetite, mild headaches and restlessness.  All of these symptoms will get better with time.    If you experience any of the following, call the Pain Clinic  during work hours (Mon-Friday 8-4:30 pm) at [phone] or the Provider Line after hours at [phone]:  -Fever over 100 degree F  -Swelling, bleeding, redness, drainage, warmth at the injection site  -Progressive weakness or numbness in your legs or arms  -Loss of bowel or bladder function  -Unusual headache that is not relieved by Tylenol or other pain reliever  -Unusual new onset of pain that is not improving

## 2022-04-12 ENCOUNTER — OFFICE VISIT (OUTPATIENT)
Dept: ORTHOPEDICS | Facility: CLINIC | Age: 82
End: 2022-04-12
Payer: COMMERCIAL

## 2022-04-12 VITALS
HEIGHT: 66 IN | WEIGHT: 178 LBS | DIASTOLIC BLOOD PRESSURE: 70 MMHG | SYSTOLIC BLOOD PRESSURE: 136 MMHG | BODY MASS INDEX: 28.61 KG/M2

## 2022-04-12 DIAGNOSIS — M25.551 POSTERIOR PAIN OF HIP, RIGHT: ICD-10-CM

## 2022-04-12 DIAGNOSIS — M54.16 LUMBAR RADICULOPATHY: Primary | ICD-10-CM

## 2022-04-12 PROCEDURE — 99213 OFFICE O/P EST LOW 20 MIN: CPT | Performed by: PEDIATRICS

## 2022-04-12 NOTE — PATIENT INSTRUCTIONS
Is good to hear of some improvement that you have had following the injection.  It is possible that the injection will continue to provide more benefit with time.  For now, it is okay to continue with monitoring, and primarily comfort as your guide with activities.  We discussed additional considerations, should there be ongoing symptoms in the future. These considerations include the following:  -Gabapentin (prescription medication, related to radiating symptoms)  -Referral for physical therapy  -Additional imaging of the pelvis (given our initial discussion started with pain around the hip)  -Referral for repeat epidural steroid injection  -Potential for referral onto other specialty, such as spine specialist, spine surgeon    Continue with monitoring for now.  Contact clinic if any questions or concerns, or if desiring to change course with treatment.    If you have any further questions for your physician or physician s care team you can call [phone] and use option 3 to leave a voice message. Calls received during business hours will be returned same day.

## 2022-04-12 NOTE — PROGRESS NOTES
ASSESSMENT & PLAN    Markel was seen today for recheck.    Diagnoses and all orders for this visit:    Lumbar radiculopathy    Posterior pain of hip, right      Some benefit from injection. PT offered, he may contact clinic if interested.  Otherwise, continue with monitoring for now.  Questions answered. Discussed signs and symptoms that may indicate more serious issues; the patient was instructed to seek appropriate care if noted. Markel indicates understanding of these issues and agrees with the plan.        See Patient Instructions  Patient Instructions   Is good to hear of some improvement that you have had following the injection.  It is possible that the injection will continue to provide more benefit with time.  For now, it is okay to continue with monitoring, and primarily comfort as your guide with activities.  We discussed additional considerations, should there be ongoing symptoms in the future. These considerations include the following:  -Gabapentin (prescription medication, related to radiating symptoms)  -Referral for physical therapy  -Additional imaging of the pelvis (given our initial discussion started with pain around the hip)  -Referral for repeat epidural steroid injection  -Potential for referral onto other specialty, such as spine specialist, spine surgeon    Continue with monitoring for now.  Contact clinic if any questions or concerns, or if desiring to change course with treatment.    If you have any further questions for your physician or physician s care team you can call [phone] and use option 3 to leave a voice message. Calls received during business hours will be returned same day.        Ellis Dacosta DO  [facility]    SUBJECTIVE- Interim History April 12, 2022    Chief Complaint   Patient presents with     Lower Back - RECHECK       Markel Padilla is a 81 year old male who is seen in f/u up for    Lumbar  "radiculopathy  Posterior pain of hip, right. Since last visit on 3/25/22 patient has undergone a L4-L5 ILESI with Dr. Cox on 3/29/22 .  Has had some improvement following the injection.  He continues to have pain getting out of bed in the morning.    Pain over the posterior and lateral leg and down into his thigh just past his knee.     Does use Voltaren and a heating pad.  He states that there is always a sensation that it is there  - Now ~ 6 weeks from initial onset          REVIEW OF SYSTEMS:  Review of Systems      OBJECTIVE:  /70   Ht 1.676 m (5' 6\")   Wt 80.7 kg (178 lb)   BMI 28.73 kg/m       Slump with mild pain right LE    RADIOLOGY:  None new. See previous notes.        "

## 2022-04-12 NOTE — LETTER
4/12/2022         RE: Markel Padilla  [address] [place] [address]        Dear Colleague,    Thank you for referring your patient, Markel Padilla, to the [facility]. Please see a copy of my visit note below.    ASSESSMENT & PLAN    Markel was seen today for recheck.    Diagnoses and all orders for this visit:    Lumbar radiculopathy    Posterior pain of hip, right      Some benefit from injection. PT offered, he may contact clinic if interested.  Otherwise, continue with monitoring for now.  Questions answered. Discussed signs and symptoms that may indicate more serious issues; the patient was instructed to seek appropriate care if noted. Markel indicates understanding of these issues and agrees with the plan.        See Patient Instructions  Patient Instructions   Is good to hear of some improvement that you have had following the injection.  It is possible that the injection will continue to provide more benefit with time.  For now, it is okay to continue with monitoring, and primarily comfort as your guide with activities.  We discussed additional considerations, should there be ongoing symptoms in the future. These considerations include the following:  -Gabapentin (prescription medication, related to radiating symptoms)  -Referral for physical therapy  -Additional imaging of the pelvis (given our initial discussion started with pain around the hip)  -Referral for repeat epidural steroid injection  -Potential for referral onto other specialty, such as spine specialist, spine surgeon    Continue with monitoring for now.  Contact clinic if any questions or concerns, or if desiring to change course with treatment.    If you have any further questions for your physician or physician s care team you can call [phone] and use option 3 to leave a voice message. Calls received during business hours will be returned same day.        Ellis Dacosta,  "DO  [facility]    SUBJECTIVE- Interim History April 12, 2022    Chief Complaint   Patient presents with     Lower Back - RECHECK       Markel Padilla is a 81 year old male who is seen in f/u up for    Lumbar radiculopathy  Posterior pain of hip, right. Since last visit on 3/25/22 patient has undergone a L4-L5 ILESI with Dr. Cox on 3/29/22 .  Has had some improvement following the injection.  He continues to have pain getting out of bed in the morning.    Pain over the posterior and lateral leg and down into his thigh just past his knee.     Does use Voltaren and a heating pad.  He states that there is always a sensation that it is there  - Now ~ 6 weeks from initial onset          REVIEW OF SYSTEMS:  Review of Systems      OBJECTIVE:  /70   Ht 1.676 m (5' 6\")   Wt 80.7 kg (178 lb)   BMI 28.73 kg/m       Slump with mild pain right LE    RADIOLOGY:  None new. See previous notes.            Again, thank you for allowing me to participate in the care of your patient.        Sincerely,        Ellis Dacosta DO    "

## 2022-04-15 DIAGNOSIS — I10 HYPERTENSION GOAL BP (BLOOD PRESSURE) < 140/90: ICD-10-CM

## 2022-04-15 RX ORDER — AMLODIPINE BESYLATE 2.5 MG/1
TABLET ORAL
Qty: 90 TABLET | Refills: 1 | Status: SHIPPED | OUTPATIENT
Start: 2022-04-15 | End: 2022-10-11

## 2022-04-28 RX ORDER — TRIAMCINOLONE ACETONIDE 40 MG/ML
40 INJECTION, SUSPENSION INTRA-ARTICULAR; INTRAMUSCULAR ONCE
Status: COMPLETED | OUTPATIENT
Start: 2022-04-28 | End: 2022-04-28

## 2022-04-28 RX ADMIN — TRIAMCINOLONE ACETONIDE 40 MG: 40 INJECTION, SUSPENSION INTRA-ARTICULAR; INTRAMUSCULAR at 19:54

## 2022-04-29 ENCOUNTER — TELEPHONE (OUTPATIENT)
Dept: ORTHOPEDICS | Facility: CLINIC | Age: 82
End: 2022-04-29
Payer: COMMERCIAL

## 2022-04-29 DIAGNOSIS — M54.16 LUMBAR RADICULOPATHY: Primary | ICD-10-CM

## 2022-04-29 DIAGNOSIS — M25.551 POSTERIOR PAIN OF HIP, RIGHT: ICD-10-CM

## 2022-04-29 DIAGNOSIS — M25.551 LATERAL PAIN OF RIGHT HIP: ICD-10-CM

## 2022-04-29 NOTE — PROGRESS NOTES
Pre procedure Diagnosis: Lumbar radiculopathy   Post procedure Diagnosis: Same  Procedure performed: L5-S1 interlaminar epidural steroid injection   Anesthesia: none  Complications: none  Operators: Rich Cox MD     Indications:   Markel Padilla is a 81 year old male.  The patient has a history of bilateral lbp.  Examination shows +slump.  he has tried conservative treatment including meds/pt.    MRI reviewed  Options/alternatives, benefits and risks were discussed with the patient including but not limited to bleeding, infection, no pain relief, tissue trauma, exposure to radiation, reaction to medications, spinal cord injury, dural puncture, weakness, numbness and headache.  Questions were answered to his satisfaction and he wishes to proceed. Voluntary informed consent was obtained and signed.     Vitals were reviewed: Yes  Allergies were reviewed:  Yes   Medications were reviewed:  Yes   Pre-procedure pain score: 8/10    Procedure:  The patient's medical history, medications, and allergies were reviewed and reconciled.  After obtaining signed informed consent, the patient was brought into the procedure suite and was placed in a prone position on the procedure table.   A Pause for the Cause was performed.  Patient was prepped and draped in the usual sterile fashion.     The L5-S1 interspace was identified with AP fluoroscopy.  A total of 5ml of 1% lidocaine was used to anesthetize the skin and subcutaneous tissues for a  midline approach.    A 20gauge 3.5inch Touhy needle was advanced utilizing intermittent AP and Lateral fluoroscopy and air for loss of resistance.  The epidural space was encountered on the first pass without difficulties.  Aspiration for blood and CSF was negative.  Needle position was verified by injecting 1 ml of Omnipaque utilizing real-time fluoroscopy that showed good needle placement and epidural spread without signs of intravascular or intrathecal uptake.  Omnipaque wasted:  9  ml.    Then, after repeated negative aspiration for blood or CSF, a combination of Kenalog 40 mg, Bupivicaine 0.25% 2 ml, diluted with 3 ml of normal saline to a total injectate volume of 6 ml was injected into the epidural space in a slow and incremental fashion and the needle was restyletted and withdrawn.  All injected medications were preservative free.    The injection site was cleaned and a sterile dressing was applied.    The patient tolerated the procedure well without complications and was taken to the recovery room for continued observation.    Images were saved to PACS.    Post-procedure pain score: 3/10  Follow-up includes:   -f/u phone call in one week  -f/u with referring provider     Rich Cox MD  [facility] [place]

## 2022-04-29 NOTE — TELEPHONE ENCOUNTER
Called and spoke with patient.   He is having some relief with the Voltaren gel.    He did remember that he had to remove his billfold with longer road trips, and this is the same type of pain.    Pain in his posterior hip and thigh.   Worse getting out of bed in the morning.     Wonders if another injection in that area may help.    Please advise on possible posterior hip injection?    Marlena Villanueva MS ATC

## 2022-05-03 NOTE — TELEPHONE ENCOUNTER
Called and spoke with patient.  He would like to try the referral for the evaluate for procedure.     He states he did go an det hact his lawn and after being on and off the tractor multiple times, he did have some soreness.. He used the voltaren gel and this did give him some relief.     He will contact the clinic if he did have any other questions.     Marlena Villanueva MS ATC

## 2022-05-03 NOTE — TELEPHONE ENCOUNTER
Would consider referral to pain mgmt for evaluation for procedure. If he agrees, can place.  Thanks.  Ellis Dacosta DO, CAQ

## 2022-06-01 ENCOUNTER — OFFICE VISIT (OUTPATIENT)
Dept: PALLIATIVE MEDICINE | Facility: CLINIC | Age: 82
End: 2022-06-01
Attending: PEDIATRICS
Payer: COMMERCIAL

## 2022-06-01 VITALS — SYSTOLIC BLOOD PRESSURE: 161 MMHG | HEART RATE: 73 BPM | DIASTOLIC BLOOD PRESSURE: 72 MMHG

## 2022-06-01 DIAGNOSIS — M54.16 LUMBAR RADICULOPATHY: ICD-10-CM

## 2022-06-01 DIAGNOSIS — M25.551 LATERAL PAIN OF RIGHT HIP: ICD-10-CM

## 2022-06-01 DIAGNOSIS — M25.551 POSTERIOR PAIN OF HIP, RIGHT: ICD-10-CM

## 2022-06-01 PROCEDURE — 99214 OFFICE O/P EST MOD 30 MIN: CPT | Performed by: PAIN MEDICINE

## 2022-06-01 NOTE — PATIENT INSTRUCTIONS
- Further procedures recommended:    - ordered L3, 4 TRANSFORAMINAL EPIDURAL STEROID INJECTION       - Follow up:    - for procedure       ----------------------------------------------------------------  Clinic Number:  [phone]   Call with any questions about your care and for scheduling assistance.   Calls are returned Monday through Friday between 8 AM and 4:30 PM. We usually get back to you within 2 business days depending on the issue/request.    If we are prescribing your medications:  For opioid medication refills, call the clinic or send a [employer] message 7 days in advance.  Please include:  Name of requested medication  Name of the pharmacy.  For non-opioid medications, call your pharmacy directly to request a refill. Please allow 3-4 days to be processed.   Per [place] State Law:  All controlled substance prescriptions must be filled within 30 days of being written.    For those controlled substances allowing refills, pickup must occur within 30 days of last fill.      We believe regular attendance is key to your success in our program!    Any time you are unable to keep your appointment we ask that you call us at least 24 hours in advance to cancel.This will allow us to offer the appointment time to another patient.   Multiple missed appointments may lead to dismissal from the clinic.

## 2022-06-01 NOTE — PROGRESS NOTES
[facility] procedure eval     Date of visit: 6/1/2022    Reason for consultation:    Primary Care Provider is Jonathan Oliver.  Pain medications are being prescribed by n/a.    Please see the [facility] health questionnaire which the patient completed and reviewed with me in detail.    Chief Complaint:    No chief complaint on file.    MME prescribed prior to seeing patient:  Current MME:    Pain history: s/p L5-S1 ILESI 3/22  Markel Padilla is a 81 year old male who first started having problems with pain chrnic  The pain is worse over the right ant thigh and leg  On sure when things started  The pt feels maybe it started this winter  The pt was snow blowing  The pain varies and severity  The pain is a deep ache  The pt reports burning over his ant thigh  Denies any numbness  Maybe some tingling     The pain is worse with prolonged walking  The pain is worse with prolnged standing  WORSE IN THE am   Pain sig beneft with ice  The pain is better with rest  The pt reports benefit with ice  The pt denies any recent falls  Denies any new weakness   Denies overt incontinence  The pain not affecting sleep  Pain sig affecting quality of life    DENIES ANY DAILY ROUTINE IN TERMS OF STRETCHES OR EXERCISES   Pain rating: intensity  Averages 9/10 on a 0-10 scale.    Current treatments include:  GLUCSOMINE     Previous medication treatments included:  NO    Other treatments have included:  Markel Padilla has been seen at a pain clinic in the past.  \  PT: NOT RECENTLY      Injections: L5-S1    Past Medical History:  Past Medical History:   Diagnosis Date     Ehrlichiosis      Hemorrhage gastric      Hypercholesteremia      Hypertension      MEDICAL HISTORY OF -     major forearm laceration     Past Surgical History:  Past Surgical History:   Procedure Laterality Date     NO HISTORY OF SURGERY       Medications:  Current Outpatient Medications   Medication Sig Dispense Refill      amLODIPine (NORVASC) 2.5 MG tablet TAKE 1 TABLET(2.5 MG) BY MOUTH DAILY 90 tablet 1     aspirin 81 MG tablet Take 1 tablet by mouth daily. (Patient not taking: Reported on 1/5/2022)       calcium carbonate (OS-FREDIS 500 MG [place]. CA) 500 MG tablet Take 1 tablet (500 mg) by mouth 2 times daily 180 tablet 3     ferrous sulfate 325 (65 FE) MG tablet Take by mouth daily (with breakfast)       FISH OIL 1000 MG OR CAPS 1 tablet daily       GLUCOSAMINE CHONDR 1500 COMPLX OR 1 tablet daily       lisinopril (ZESTRIL) 10 MG tablet Take 1 tablet (10 mg) by mouth daily 90 tablet 1     MULTI-VITAMIN OR TABS 1 TABLET DAILY       omeprazole (PRILOSEC) 20 MG DR capsule Take 1 capsule (20 mg) by mouth daily 90 capsule 2     simvastatin (ZOCOR) 40 MG tablet Take 1 tablet (40 mg) by mouth At Bedtime 90 tablet 1     triamcinolone (KENALOG) 0.1 % external cream APPLY EXTERNALLY TO THE AFFECTED AREA TWICE DAILY AS NEEDED FOR IRRITATION 80 g 0     Allergies:     Allergies   Allergen Reactions     Nkda [No Known Drug Allergies]      Social History:    History of chemical dependency treatment: n    Family history:  Family History   Problem Relation Age of Onset     Heart Disease Mother      Lipids Mother      C.A.D. Father         age 65     Respiratory Sister         COPD     Hypertension Son      Hypertension Son      Family history of headaches: m    Review of Systems:  Skin: negative  Eyes: negative  Ears/Nose/Throat: negative  Respiratory: No shortness of breath, dyspnea on exertion, cough, or hemoptysis  Cardiovascular: negative  Gastrointestinal: negative  Genitourinary: negative  Musculoskeletal: negative  Neurologic: negative  Psychiatric: negative  Hematologic/Lymphatic/Immunologic: negative  Endocrine: negative    Physical Exam:  There were no vitals filed for this visit.  Exam:  Constitutional: healthy, alert and no distress  Head: normocephalic. Atraumatic.     Cardiovascular: Negative JVD  Respiratory: Speaking in full sentences  no accessory muscles use   ]  Psychiatric: mentation appears normal and affect normal/bright    Musculoskeletal exam:  Gait/Station/Posture: slightly antalgiv  Cervical spine: ROM       Thoracic spine:  Normal     Lumbar spine:     ROM: wnl   Myofascial tenderness:  Mild on the right   Brannon's: neg               Straight leg exam: neg   TAYLOR/FADIR: neg              SI: neg   Greater trochanteric bursa: neg  Neurologic exam:  Motor:  5/5 UE and LE strength,   Reflexes:        Achilles:  +2    Sensory:  (upper and lower extremities):   Light touch: normal    Allodynia: absent    Dysethesia: absent    Hyperalgesia: absent     Diagnostic tests:  MRI   FINDINGS: Nomenclature is based on five lumbar vertebral bodies.  Normal vertebral body heights. Retrolisthesis of L2 on L3 measuring 4  mm. Retrolisthesis of L3 on L4 measuring 3 mm. Anterolisthesis of L4  on L5 measuring 4-5 mm. Anterolisthesis of L5 on S1 measuring 3 mm.  Exaggerated lumbar lordosis. Mild dextroconvex curvature centered in  the upper lumbar spine. Somewhat diffusely heterogeneous bone marrow  signal, but overall appearing benign. Mild Modic type I degenerative  endplate signal changes anteriorly at T12-L1, along the left posterior  aspect of the L3-L4 disc space, and along the right posterior aspect  of the L4-L5 disc space. Diffuse intervertebral disc desiccation.  Normal appearance of the distal spinal cord with the conus terminating  at L1. The paraspinal soft tissues and visualized aspects of the bony  pelvis are unremarkable.     Segmental analysis:  T12-L1: Minimal disc height loss. Shallow symmetric disc bulge. Normal  facets. No spinal canal stenosis. No significant neural foraminal  stenosis.     L1-L2: Mild disc height loss. No herniation. Normal facets. No spinal  canal or neural foraminal stenosis.     L2-L3: Moderate to severe disc height loss. Symmetric diffuse disc  bulge with posterior endplate osteophytic ridging. Mild  facet  arthropathy. Mild spinal canal stenosis. Mild bilateral neural  foraminal stenosis.     L3-L4: Minimal disc height loss. Symmetric disc bulge with posterior  endplate osteophytic ridging. Mild to moderate facet arthropathy.  Ligamentum flavum thickening. Mild central spinal canal stenosis with  mild to moderate bilateral lateral recess stenosis. Mild to moderate  left and mild right neural foraminal stenosis.     L4-L5: Minimal disc height loss. Symmetric disc bulge with  posterolateral marginal endplate osteophytes. There is a superimposed  cranially migrating right far lateral disc extrusion (series 6 image  36). Moderate bilateral facet arthropathy. Ligamentum flavum  thickening. Moderate central spinal canal stenosis with moderate to  marked bilateral lateral recess stenosis. There is moderate to severe  right neural foraminal stenosis with mass effect on the exiting right  L4 nerve roots. There is mild left neural foraminal stenosis.     L5-S1: Mild disc height loss. Symmetric disc bulge. Moderate bordering  on moderate to severe facet arthropathy. No significant spinal canal  stenosis. Mild/mild to moderate bilateral neural foraminal stenosis.                                                                       IMPRESSION:  1. 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  2. Moderate central spinal canal stenosis with moderate to marked  bilateral lateral recess narrowing at L4-L5.  3. Cranially migrating right far lateral disc extrusion at L4-L5  contributing to moderate to severe right neural foraminal stenosis  with mass effect on the exiting right L4 nerve root. Correlate  clinically for possible right L4 radiculopathy. Lesser degrees of  neural foraminal narrowing elsewhere, as described.  4. Mild multilevel degenerative spondylolisthesis.         Assessment/Plan:  Markel Padilla is a 81 year old male who presents with the complaints of right LBP radiating to his LE.    Diagnoses and all orders for this visit:    Lumbar radiculopathy  -     Pain Management Referral    Posterior pain of hip, right  -     Pain Management Referral    Lateral pain of right hip  -     Pain Management Referral         - Further procedures recommended:    - ordered L3, 4 TRANSFORAMINAL EPIDURAL STEROID INJECTION       - Follow up:    - for procedure       The total TIME spent on this patient on the day of the appointment was 25 minutes.   Time spent preparing to see the patient (reviewing records and tests)  Time spend face to face with the patient  Time spent ordering tests, medications, procedures and referrals  Time spent Referring and communicating with other healthcare professionals  Documenting clinical information in Epic    Rich Cox MD  [facility]  This note was created with voice recognition software, and while reviewed for accuracy, typos may remain.

## 2022-06-02 ENCOUNTER — TELEPHONE (OUTPATIENT)
Dept: PALLIATIVE MEDICINE | Facility: CLINIC | Age: 82
End: 2022-06-02
Payer: COMMERCIAL

## 2022-06-02 NOTE — TELEPHONE ENCOUNTER
Screening Questions for Radiology Injections:    Injection to be done at which interventional clinic site? [facility]    Remind [place] Pt's that Covid test is no longer required except for sedation procedure Pt's.    Instruct patient to arrive as directed prior to the scheduled appointment time:    Madhavi: 30 minutes before; if IV needed 1 hour before     Procedure ordered by Zac    Procedure ordered? Right L3-4, L4-5       Transforaminal Cervical CHANNING -  [place] does NOT have providers that do these- [facility] and [facility] do have providers that do    As a reminder, receiving steroids can decrease your body's ability to fight infection.   Would you still like to move forward with scheduling the injection?  Yes    What insurance would patient like us to bill for this procedure? [employer]      Worker's comp or MVA (motor vehicle accident) -Any injection DO NOT SCHEDULE and route to Angel Potter.      imedo insurance - For SI joint injections, DO NOT SCHEDULE and route Rose Bull.       ALL BCBS, [employer] and [employer] C[employer]-Route to Rose for review DO NOT SCHEDULE      IF SCHEDULING IN [place] AND NEEDS A PA, IT IS OKAY TO SCHEDULE. [place] HANDLES THEIR OWN PA'S AFTER THE PATIENT IS SCHEDULED. PLEASE SCHEDULE AT LEAST 1 WEEK OUT SO A PA CAN BE OBTAINED.    Any chance of pregnancy? NO   If YES, do NOT schedule and route to RN pool    Is an  needed? No     Patient has a drive home? (mandatory) YES: ok    Is patient taking any blood thinners (That is: Coumadin, Warfarin, Jantoven, Pradaxa Xarelto, Eliquis, Edoxaban, Enoxaparin, Lovenox, Heparin, Arixtra, Fondaparinux, or Fragmin? OR Antiplatelet medication such as Plavix, Brilinta, or Effient? )? No   If hold needed, do NOT schedule, route to RN pool     Is patient taking any aspirin products (includes Excedrin and Fiorinal)? No     If more than 325mg/day, OK to schedule; Instruct pt to decrease to less than 325 mg for  7 days AND route to RN pool    For CERVICAL procedures, hold all aspirin products for 6 days.     Tell pt that if aspirin product is not held for 6 days, the procedure WILL BE cancelled.      Does the patient have a bleeding or clotting disorder? No     If YES, okay to schedule AND route to RN nurse pool    For any patients with platelet count <100, must be forwarded to provider    Any allergies to contrast dye, iodine, shellfish, or numbing and steroid medications? No    If YES, add allergy information to appointment notes AND route to the RN pool     If CHANNING and Contrast Dye / Iodine Allergy? DO NOT SCHEDULE, route to RN pool    Allergies: Nkda [no known drug allergies]     Is patient diabetic?  No  If YES, instruct them to bring their glucometer.    Does patient have an active infection or treated for one within the past week? No     Is patient currently taking any antibiotics?  No     For patients on chronic, preventative, or prophylactic antibiotics, procedures may be scheduled.     For patients on antibiotics for active or recent infection:antibiotic course must have been completed for 4 days    Is patient currently taking any steroid medications? (i.e. Prednisone, Medrol)  No     For patients on steroid medications, course must have been completed for 4 days    Is patient actively being treated for cancer or immunocompromised? No  If YES, do NOT schedule and route to RN pool     Are you able to get on and off an exam table with minimal or no assistance? Yes  If NO, do NOT schedule and route to RN pool    Are you able to roll over and lay on your stomach with minimal or no assistance? Yes  If NO, do NOT schedule and route to RN pool     Has the patient had a flu shot or any other vaccinations within 7 days before or after the procedure.  No     Have you recently had a COVID vaccine or have plans to get it in the near future? No    If yes, explain that for the vaccine to work best they need to:       wait 1 week  before and 1 week after getting Vaccine #1    wait 1 week before and 2 weeks after getting Vaccine #2    wait 1 week before and 2 weeks after getting Vaccine BOOSTER    If patient has concerns about the timing, send to RN pool     Does patient have an MRI/CT?  YES: MRI  Check Procedure Scheduling Grid to see if required.      Was the MRI done within the last 3 years?  Yes    If yes, [facility], [facility], [facility], [facility] etc? [facility]      If no, do not schedule and route to RN pool    If MRI was not done at [facility], [facility] or [facility] do NOT schedule and route to RN pool.      If pt has an imaging disc, the injection MAY be scheduled but pt has to bring disc to appt. (Except [place], no disc reading available)    If they show up without the disc the injection cannot be done    Procedure Specific Instructions:      If celiac plexus block, informed patient NPO for 6 hours and that it is okay to take medications with sips of water, especially blood pressure medications  Not Applicable         If this is for a cervical procedure, informed patient that aspirin needs to be held for 6 days.   Not Applicable      Sedation, If Sedation is ordered for any procedure, Pt must be NPO for 6 hours prior to procedure  Not Applicable      If IV needed:    Do not schedule procedures requiring IV placement in the first appointment of the day or first appointment after lunch. Do NOT schedule at 0745, 0815 or 1245. ok    Instructed pt to arrive 30 minutes early for IV start if required. (Check Procedure Scheduling Grid)  Not Applicable    Reminders:      If you are started on any steroids or antibiotics between now and your appointment, you must contact us because the procedure may need to be cancelled.  Yes      For all procedures except radiofrequency ablations (RFAs) and spinal cord stimulator (SCS) trials, informed patient:    IV sedation is not provided for this procedure.   If you feel that an oral anti-anxiety medication is needed, you can discuss this further with your referring provider or primary care provider.  The Pain Clinic provider will discuss specifics of what the procedure includes at your appointment.  Most procedures last 10-20 minutes.  We use numbing medications to help with any discomfort during the procedure.  Not Applicable      For patients 85 or older we recommend having an adult stay w/ them for the remainder of the day.   ok    Does the patient have any questions?  NO  Yissel Potter  [facility]

## 2022-06-14 NOTE — PROGRESS NOTES
[facility]  [address] [address]  Phone: [phone]  Primary Provider: Vanessa Oliver  Pre-op Performing Provider: VANESSA OLIVER    PREOPERATIVE EVALUATION:  Today's date: 6/15/2022    Markel Padilla is a 81 year old male who presents for a preoperative evaluation.    Surgical Information:  Surgery/Procedure: Eye Surgery  Surgery Location: [facility]  Surgeon: Dr. Grande  Surgery Date: 6/23/2022  Time of Surgery: n/a  Where patient plans to recover: At home with family  Fax number for surgical facility:     Type of Anesthesia Anticipated: to be determined    Assessment & Plan     The proposed surgical procedure is considered LOW risk.      ICD-10-CM    1. Pre-op exam  Z01.818    2. Cataract, unspecified cataract type, unspecified laterality  H26.9    3. Hypertension goal BP (blood pressure) < 140/90  I10 lisinopril (ZESTRIL) 10 MG tablet     Basic metabolic panel  (Ca, Cl, CO2, Creat, Gluc, K, Na, BUN)     Basic metabolic panel  (Ca, Cl, CO2, Creat, Gluc, K, Na, BUN)   4. Gastroesophageal reflux disease, unspecified whether esophagitis present  K21.9 omeprazole (PRILOSEC) 20 MG DR capsule   5. High cholesterol  E78.00 simvastatin (ZOCOR) 40 MG tablet     Lipid panel reflex to direct LDL Fasting     Lipid panel reflex to direct LDL Fasting         Risks and Recommendations:  The patient has the following additional risks and recommendations for perioperative complications:   - No identified additional risk factors other than previously addressed    Medication Instructions:  Patient is to take all scheduled medications on the day of surgery    RECOMMENDATION:  APPROVAL GIVEN to proceed with proposed procedure, without further diagnostic evaluation.      Subjective     HPI related to upcoming procedure: history vision changes and found to have cataract over the last couple years.       Preop Questions 6/15/2022   1. Have you ever had  a heart attack or stroke? No   2. Have you ever had surgery on your heart or blood vessels, such as a stent placement, a coronary artery bypass, or surgery on an artery in your head, neck, heart, or legs? No   3. Do you have chest pain with activity? No   4. Do you have a history of  heart failure? No   5. Do you currently have a cold, bronchitis or symptoms of other infection? No   6. Do you have a cough, shortness of breath, or wheezing? No   7. Do you or anyone in your family have previous history of blood clots? No   8. Do you or does anyone in your family have a serious bleeding problem such as prolonged bleeding following surgeries or cuts? No   9. Have you ever had problems with anemia or been told to take iron pills? No   10. Have you had any abnormal blood loss such as black, tarry or bloody stools? No   11. Have you ever had a blood transfusion? No   12. Are you willing to have a blood transfusion if it is medically needed before, during, or after your surgery? Yes   13. Have you or any of your relatives ever had problems with anesthesia? No   14. Do you have sleep apnea, excessive snoring or daytime drowsiness? No   15. Do you have any artifical heart valves or other implanted medical devices like a pacemaker, defibrillator, or continuous glucose monitor? No   16. Do you have artificial joints? No   17. Are you allergic to latex? No     Health Care Directive:  Patient does not have a Health Care Directive or Living Will: Discussed advance care planning with patient; however, patient declined at this time.    Status of Chronic Conditions:  See problem list for active medical problems.  Problems all longstanding and stable, except as noted/documented.  See ROS for pertinent symptoms related to these conditions.      Review of Systems  CONSTITUTIONAL: NEGATIVE for fever, chills, change in weight  ENT/MOUTH: NEGATIVE for ear, mouth and throat problems  RESP: NEGATIVE for significant cough or SOB  CV: NEGATIVE  for chest pain, palpitations or peripheral edema    Patient Active Problem List    Diagnosis Date Noted     Edema leg 12/30/2020     Priority: Medium     Rash 12/30/2020     Priority: Medium     RBBB 12/30/2020     Priority: Medium     Atrial enlargement, bilateral 12/30/2020     Priority: Medium     Trigger finger, acquired 02/25/2020     Priority: Medium     Raynaud's disease without gangrene 01/10/2017     Priority: Medium     Gastroesophageal reflux disease, esophagitis presence not specified 01/27/2016     Priority: Medium     IMO Regulatory Load OCT 2020       Hearing loss, bilateral 01/27/2016     Priority: Medium     Eczema 08/07/2015     Priority: Medium     Hearing loss 07/26/2013     Priority: Medium     Problem list name updated by automated process. Provider to review       Sleep disturbance 07/26/2013     Priority: Medium     Overweight (BMI 25.0-29.9) 06/07/2011     Priority: Medium     Advanced directives, counseling/discussion 06/07/2011     Priority: Medium     Advance Directive Problem List Overview:   Name Relationship Phone    Primary Health Care Agent            Alternative Health Care Agent          Discussed advance care planning with patient; however, patient declined at this time. 6/7/2011 IRMA Choi MA         Hypertension goal BP (blood pressure) < 140/90 03/09/2011     Priority: Medium     High cholesterol 03/09/2011     Priority: Medium     Erectile dysfunction 11/02/2009     Priority: Medium      Past Medical History:   Diagnosis Date     Ehrlichiosis      Hemorrhage gastric      Hypercholesteremia      Hypertension      MEDICAL HISTORY OF -     major forearm laceration     Past Surgical History:   Procedure Laterality Date     NO HISTORY OF SURGERY       Current Outpatient Medications   Medication Sig Dispense Refill     amLODIPine (NORVASC) 2.5 MG tablet TAKE 1 TABLET(2.5 MG) BY MOUTH DAILY 90 tablet 1     aspirin 81 MG tablet Take 1 tablet by mouth daily       calcium carbonate  "(OS-FREDIS 500 MG [place]. CA) 500 MG tablet Take 1 tablet (500 mg) by mouth 2 times daily 180 tablet 3     ferrous sulfate 325 (65 FE) MG tablet Take by mouth daily (with breakfast)       FISH OIL 1000 MG OR CAPS 1 tablet daily       GLUCOSAMINE CHONDR 1500 COMPLX OR 1 tablet daily       lisinopril (ZESTRIL) 10 MG tablet Take 1 tablet (10 mg) by mouth daily 90 tablet 1     MULTI-VITAMIN OR TABS 1 TABLET DAILY       omeprazole (PRILOSEC) 20 MG DR capsule Take 1 capsule (20 mg) by mouth daily 90 capsule 2     simvastatin (ZOCOR) 40 MG tablet Take 1 tablet (40 mg) by mouth At Bedtime 90 tablet 1     triamcinolone (KENALOG) 0.1 % external cream APPLY EXTERNALLY TO THE AFFECTED AREA TWICE DAILY AS NEEDED FOR IRRITATION 80 g 0       Allergies   Allergen Reactions     Nkda [No Known Drug Allergies]         Social History     Tobacco Use     Smoking status: Former Smoker     Types: Cigarettes     Quit date: 1979     Years since quittin.6     Smokeless tobacco: Current User     Types: Chew     Last attempt to quit: 1989   Substance Use Topics     Alcohol use: Yes     Comment: a glass of wine everyday at lunch     Family History   Problem Relation Age of Onset     Heart Disease Mother      Lipids Mother      C.A.D. Father         age 65     Respiratory Sister         COPD     Hypertension Son      Hypertension Son      History   Drug Use No         Objective     /70   Pulse 82   Temp 97  F (36.1  C) (Tympanic)   Resp 16   Ht 1.676 m (5' 6\")   Wt 82.6 kg (182 lb)   SpO2 96%   BMI 29.38 kg/m      Physical Exam    GENERAL APPEARANCE: healthy, alert and no distress     EYES: EOMI,  PERRL     HENT: ear canals and TM's normal and nose and mouth without ulcers or lesions     NECK: no adenopathy, no asymmetry, masses, or scars and thyroid normal to palpation     RESP: lungs clear to auscultation - no rales, rhonchi or wheezes     CV: regular rates and rhythm, normal S1 S2, no S3 or S4 and no murmur, click or " rub     ABDOMEN:  soft, nontender, no HSM or masses and bowel sounds normal     MS: extremities normal- no gross deformities noted, no evidence of inflammation in joints, FROM in all extremities.     SKIN: no suspicious lesions or rashes     NEURO: Normal strength and tone, sensory exam grossly normal, mentation intact and speech normal     PSYCH: mentation appears normal. and affect normal/bright     LYMPHATICS: No cervical adenopathy    Recent Labs   Lab Test 01/26/22  0916 01/21/22  0914 01/11/22  1105 10/20/21  0652   HGB 14.9  --  15.8  --      --  166  --    NA  --   --  135 137   POTASSIUM  --   --  4.0 4.7   CR  --  0.7 0.85 0.90   A1C  --   --  5.4  --         Diagnostics:  Labs pending at this time.  Results will be reviewed when available.   No EKG required for low risk surgery (cataract, skin procedure, breast biopsy, etc).    Revised Cardiac Risk Index (RCRI):  The patient has the following serious cardiovascular risks for perioperative complications:   - No serious cardiac risks = 0 points     RCRI Interpretation: 0 points: Class I (very low risk - 0.4% complication rate)       Signed Electronically by: Jonathan Oliver PA-C  Copy of this evaluation report is provided to requesting physician.

## 2022-06-15 ENCOUNTER — OFFICE VISIT (OUTPATIENT)
Dept: FAMILY MEDICINE | Facility: CLINIC | Age: 82
End: 2022-06-15
Payer: COMMERCIAL

## 2022-06-15 VITALS
OXYGEN SATURATION: 96 % | WEIGHT: 182 LBS | SYSTOLIC BLOOD PRESSURE: 138 MMHG | TEMPERATURE: 97 F | BODY MASS INDEX: 29.25 KG/M2 | DIASTOLIC BLOOD PRESSURE: 70 MMHG | RESPIRATION RATE: 16 BRPM | HEIGHT: 66 IN | HEART RATE: 82 BPM

## 2022-06-15 DIAGNOSIS — Z01.818 PRE-OP EXAM: Primary | ICD-10-CM

## 2022-06-15 DIAGNOSIS — I10 HYPERTENSION GOAL BP (BLOOD PRESSURE) < 140/90: ICD-10-CM

## 2022-06-15 DIAGNOSIS — E78.00 HIGH CHOLESTEROL: ICD-10-CM

## 2022-06-15 DIAGNOSIS — H26.9 CATARACT, UNSPECIFIED CATARACT TYPE, UNSPECIFIED LATERALITY: ICD-10-CM

## 2022-06-15 DIAGNOSIS — K21.9 GASTROESOPHAGEAL REFLUX DISEASE, UNSPECIFIED WHETHER ESOPHAGITIS PRESENT: ICD-10-CM

## 2022-06-15 LAB
ANION GAP SERPL CALCULATED.3IONS-SCNC: 5 MMOL/L (ref 3–14)
BUN SERPL-MCNC: 11 MG/DL (ref 7–30)
CALCIUM SERPL-MCNC: 9.3 MG/DL (ref 8.5–10.1)
CHLORIDE BLD-SCNC: 106 MMOL/L (ref 94–109)
CHOLEST SERPL-MCNC: 159 MG/DL
CO2 SERPL-SCNC: 26 MMOL/L (ref 20–32)
CREAT SERPL-MCNC: 0.76 MG/DL (ref 0.66–1.25)
FASTING STATUS PATIENT QL REPORTED: NORMAL
GFR SERPL CREATININE-BSD FRML MDRD: 90 ML/MIN/1.73M2
GLUCOSE BLD-MCNC: 124 MG/DL (ref 70–99)
HDLC SERPL-MCNC: 57 MG/DL
LDLC SERPL CALC-MCNC: 76 MG/DL
NONHDLC SERPL-MCNC: 102 MG/DL
POTASSIUM BLD-SCNC: 4.7 MMOL/L (ref 3.4–5.3)
SODIUM SERPL-SCNC: 137 MMOL/L (ref 133–144)
TRIGL SERPL-MCNC: 130 MG/DL

## 2022-06-15 PROCEDURE — 80061 LIPID PANEL: CPT | Performed by: PHYSICIAN ASSISTANT

## 2022-06-15 PROCEDURE — 36415 COLL VENOUS BLD VENIPUNCTURE: CPT | Performed by: PHYSICIAN ASSISTANT

## 2022-06-15 PROCEDURE — 99214 OFFICE O/P EST MOD 30 MIN: CPT | Performed by: PHYSICIAN ASSISTANT

## 2022-06-15 PROCEDURE — 80048 BASIC METABOLIC PNL TOTAL CA: CPT | Performed by: PHYSICIAN ASSISTANT

## 2022-06-15 RX ORDER — LISINOPRIL 10 MG/1
10 TABLET ORAL DAILY
Qty: 90 TABLET | Refills: 1 | Status: SHIPPED | OUTPATIENT
Start: 2022-06-15 | End: 2022-12-15

## 2022-06-15 RX ORDER — SIMVASTATIN 40 MG
40 TABLET ORAL AT BEDTIME
Qty: 90 TABLET | Refills: 1 | Status: SHIPPED | OUTPATIENT
Start: 2022-06-15 | End: 2022-12-15

## 2022-06-15 ASSESSMENT — PAIN SCALES - GENERAL: PAINLEVEL: NO PAIN (0)

## 2022-06-15 NOTE — LETTER
June 16, 2022      Markel Padilla  [address] [address]        Mr. Padilla,     All of your labs were normal/near normal for you.     Please contact the clinic if you have additional questions.  Thank you.     Sincerely,     Jonathan Oliver PA-C       Resulted Orders   Basic metabolic panel  (Ca, Cl, CO2, Creat, Gluc, K, Na, BUN)   Result Value Ref Range    Sodium 137 133 - 144 mmol/L    Potassium 4.7 3.4 - 5.3 mmol/L    Chloride 106 94 - 109 mmol/L    Carbon Dioxide (CO2) 26 20 - 32 mmol/L    Anion Gap 5 3 - 14 mmol/L    Urea Nitrogen 11 7 - 30 mg/dL    Creatinine 0.76 0.66 - 1.25 mg/dL    Calcium 9.3 8.5 - 10.1 mg/dL    Glucose 124 (H) 70 - 99 mg/dL    GFR Estimate 90 >60 mL/min/1.73m2      Comment:      Effective December 21, 2021 eGFRcr in adults is calculated using the 2021 CKD-EPI creatinine equation which includes age and gender (Sandra godfrey al., [facility], DOI: 10.1056/PMXSox9083553)   Lipid panel reflex to direct LDL Fasting   Result Value Ref Range    Cholesterol 159 <200 mg/dL    Triglycerides 130 <150 mg/dL    Direct Measure HDL 57 >=40 mg/dL    LDL Cholesterol Calculated 76 <=100 mg/dL    Non HDL Cholesterol 102 <130 mg/dL    Patient Fasting > 8hrs? Unknown     Narrative    Cholesterol  Desirable:  <200 mg/dL    Triglycerides  Normal:  Less than 150 mg/dL  Borderline High:  150-199 mg/dL  High:  200-499 mg/dL  Very High:  Greater than or equal to 500 mg/dL    Direct Measure HDL  Female:  Greater than or equal to 50 mg/dL   Male:  Greater than or equal to 40 mg/dL    LDL Cholesterol  Desirable:  <100mg/dL  Above Desirable:  100-129 mg/dL   Borderline High:  130-159 mg/dL   High:  160-189 mg/dL   Very High:  >= 190 mg/dL    Non HDL Cholesterol  Desirable:  130 mg/dL  Above Desirable:  130-159 mg/dL  Borderline High:  160-189 mg/dL  High:  190-219 mg/dL  Very High:  Greater than or equal to 220 mg/dL

## 2022-06-21 ENCOUNTER — RADIOLOGY INJECTION OFFICE VISIT (OUTPATIENT)
Dept: PALLIATIVE MEDICINE | Facility: CLINIC | Age: 82
End: 2022-06-21
Attending: PAIN MEDICINE
Payer: COMMERCIAL

## 2022-06-21 VITALS — DIASTOLIC BLOOD PRESSURE: 69 MMHG | HEART RATE: 69 BPM | OXYGEN SATURATION: 97 % | SYSTOLIC BLOOD PRESSURE: 137 MMHG

## 2022-06-21 DIAGNOSIS — M54.16 LUMBAR RADICULOPATHY: ICD-10-CM

## 2022-06-21 PROCEDURE — 64483 NJX AA&/STRD TFRM EPI L/S 1: CPT | Mod: RT | Performed by: PAIN MEDICINE

## 2022-06-21 RX ORDER — DEXAMETHASONE SODIUM PHOSPHATE 10 MG/ML
10 INJECTION, SOLUTION INTRAMUSCULAR; INTRAVENOUS ONCE
Status: COMPLETED | OUTPATIENT
Start: 2022-06-21 | End: 2022-06-21

## 2022-06-21 RX ADMIN — DEXAMETHASONE SODIUM PHOSPHATE 10 MG: 10 INJECTION, SOLUTION INTRAMUSCULAR; INTRAVENOUS at 09:50

## 2022-06-21 ASSESSMENT — PAIN SCALES - GENERAL: PAINLEVEL: MILD PAIN (2)

## 2022-06-21 NOTE — NURSING NOTE
Pre-procedure Intake  If YES to any questions or NO to having a   Please complete laminated checklist and leave on the computer keyboard for Provider, verbally inform provider if able.    For SCS Trial, RFA's or any sedation procedure:  Have you been fasting? Yes    If yes, for how long?     Are you taking any any blood thinners such as Coumadin, Warfarin, Jantoven, Pradaxa Xarelto, Eliquis, Edoxaban, Enoxaparin, Lovenox, Heparin, Arixtra, Fondaparinux, or Fragmin? OR Antiplatelet medication such as Plavix, Brilinta, or Effient?   No     If yes, when did you take your last dose?     Do you take aspirin?  No    If cervical procedure, have you held aspirin for 6 days?       Do you have any allergies to contrast dye, iodine, steroid and/or numbing medications?  NO    Are you currently taking antibiotics or have an active infection?  NO    Have you had a fever/elevated temperature within the past week? NO    Are you currently taking oral steroids? NO    Do you have a ? Yes    Are you pregnant or breastfeeding?  Not Applicable    Have you received the COVID-19 vaccine? Yes    If yes, was it your 1st, 2nd or only dose needed?     Date of most recent vaccine: 10/12/21    Notify provider and RNs if systolic BP >170, diastolic BP >100, P >100 or O2 sats < 90%

## 2022-06-21 NOTE — NURSING NOTE
Discharge Information    IV Discontiued Time:  NA    Amount of Fluid Infused:  NA    Discharge Criteria = When patient returns to baseline or as per MD order    Consciousness:  Pt is fully awake    Circulation:  BP +/- 20% of pre-procedure level    Respiration:  Patient is able to breathe deeply    O2 Sat:  Patient is able to maintain O2 Sat >92% on room air    Activity:  Moves 4 extremities on command    Ambulation:  Patient is able to stand and walk or stand and pivot into wheelchair    Dressing:  Clean/dry or No Dressing    Notes:   Discharge instructions and AVS given to patient    Patient meets criteria for discharge?  YES    Admitted to PCU?  No    Responsible adult present to accompany patient home?  Yes    Signature/Title:    dom sosa RN  RN Care Coordinator  [facility]

## 2022-06-21 NOTE — PATIENT INSTRUCTIONS
[facility]   Procedure Discharge Instructions    Today you saw:  Dr. Rich Cox    You had an:  Epidural steroid injection   -lumbar    Medications used:  Lidocaine   Bupivacaine   Dexamethasone Omnipaque          Be cautious when walking. Numbness and/or weakness in the lower extremities may occur for up to 6-8 hours after the procedure due to effect of the local anesthetic  Do not drive for 6 hours. The effect of the local anesthetic could slow your reflexes.   You may resume your regular activities after 24 hours  Avoid strenuous activity for the first 24 hours  You may shower, however avoid swimming, tub baths or hot tubs for 24 hours following your procedure  You may have a mild to moderate increase in pain for several days following the injection.  It may take up to 14 days for the steroid medication to start working although you may feel the effect as early as a few days after the procedure.     You may use ice packs for 10-15 minutes, 3 to 4 times a day at the injection site for comfort  Do not use heat to painful areas for 6 to 8 hours. This will give the local anesthetic time to wear off and prevent you from accidentally burning your skin.   Unless you have been directed to avoid the use of anti-inflammatory medications (NSAIDS), you may use medications such as ibuprofen, Aleve or Tylenol for pain control if needed.   If you were fasting, you may resume your normal diet and medications after the procedure  If you have diabetes, check your blood sugar more frequently than usual as your blood sugar may be higher than normal for 10-14 days following a steroid injection. Contact your doctor who manages your diabetes if your blood sugar is higher than usual  Possible side effects of steroids that you may experience include flushing, elevated blood pressure, increased appetite, mild headaches and restlessness.  All of these symptoms will get better with time.  If you  experience any of the following, call the Pain Clinic during work hours (Mon-Friday 8-4:30 pm) at [phone] or the Provider Line after hours at [phone]:  -Fever over 100 degree F  -Swelling, bleeding, redness, drainage, warmth at the injection site  -Progressive weakness or numbness in your legs or arms  -Loss of bowel or bladder function  -Unusual headache that is not relieved by Tylenol or other pain reliever  -Unusual new onset of pain that is not improving

## 2022-06-21 NOTE — PROGRESS NOTES
Pre procedure Diagnosis: lumbar radiculopathy, lumbar degenerative disc disease   Post procedure Diagnosis: Same  Procedure performed: lumbar transforaminal epidural steroid injection at right L4,5 fluoroscopically guided, contrast controlled  Anesthesia: none  Complications: none  Operators: Rich Cox MD     Indications:   Markel Padilla is a 81 year old male.  They have a history of right LBP RLE.  Exam shows neg slump and they have tried conservative treatment including meds/pt/injections.    MRI reviewed  Options/alternatives, benefits and risks were discussed with the patient including bleeding, infection, tissue trauma, numbness, weakness, paralysis, spinal cord injury, radiation exposure, headache and reaction to medications. Questions were answered to his satisfaction and he agrees to proceed. Voluntary informed consent was obtained and signed.     Vitals were reviewed: Yes  BP (!) 146/67   Pulse 83   Allergies were reviewed:  Yes   Medications were reviewed:  Yes   Pre-procedure pain score: 2/10    Procedure:  After getting informed consent, patient was brought into the procedure suite and was placed in a prone position on the procedure table.   A Pause for the Cause was performed.  Patient was prepped and draped in sterile fashion.     After identifying the right L4,5 neuroforamen, the C-arm was rotated to a right lateral oblique angle.  A total of 4ml of Lidocaine 1% was used to anesthetize the skin and the needle track at a skin entry site coaxial with the fluoroscopy beam, and overriding the superior aspect of the neuroforamen.  A 22 gauge 3.5 inch spinal needle was advanced under intermittent fluoroscopy until it entered the foramen superiorly.    The position was then inspected from anteroposterior and lateral views, and the needle adjusted appropriately.  A total of 2ml of Omnipaque-300 was injected, confirming appropriate position, with spread into the nerve root sheath and the epidural  space, with no intravascular uptake. 8ml was wasted    Then, after repeated negative aspiration, a combination of Decadron 10 mg, 0.25% bupivacaine 2 ml, diluted with 3ml of normal saline was injected.     Hemostasis was achieved, the area was cleaned, and bandaids were placed when appropriate.  The patient tolerated the procedure well, and was taken to the recovery room.    Images were saved to PACS.    Post-procedure pain score: 1/10  Follow-up includes:   -f/u phone call in one week  -f/u with referring provider    Rich Cox MD  [facility] [place]

## 2022-09-07 NOTE — PROGRESS NOTES
Please review, associate diagnosis and sign pending laboratory future orders for patient's  upcoming lab appointment on 09/26/20.    Thank you,   Mildred Palm MLT  
To Provider,  Please see the message from the lab and advise.  Katrina Schreiber TC  
yes

## 2022-09-30 ENCOUNTER — OFFICE VISIT (OUTPATIENT)
Dept: URGENT CARE | Facility: URGENT CARE | Age: 82
End: 2022-09-30
Payer: COMMERCIAL

## 2022-09-30 VITALS
WEIGHT: 182.6 LBS | DIASTOLIC BLOOD PRESSURE: 64 MMHG | OXYGEN SATURATION: 96 % | SYSTOLIC BLOOD PRESSURE: 130 MMHG | HEART RATE: 56 BPM | RESPIRATION RATE: 20 BRPM | TEMPERATURE: 97.5 F | BODY MASS INDEX: 29.47 KG/M2

## 2022-09-30 DIAGNOSIS — R09.A2 GLOBUS SENSATION: Primary | ICD-10-CM

## 2022-09-30 PROCEDURE — 99213 OFFICE O/P EST LOW 20 MIN: CPT | Performed by: STUDENT IN AN ORGANIZED HEALTH CARE EDUCATION/TRAINING PROGRAM

## 2022-09-30 NOTE — PROGRESS NOTES
"ASSESSMENT/PLAN:  (R19.8) Globus sensation  (primary encounter diagnosis)  Comment: Overall most c/w globus sensation. Normal exam. Does have remote smoking and also occasionally chews tobacco. Absolutely no other sx such as pain or difficulty with swallowing, change of speech, difficulty breathing, GERD, or weight loss. Discussed with patient who wants to see how things go with prilosec increase, but understands if any sx change and he starts to get any difficulty with actually swallowing or gets weight loss etc, that he may need to get further studies such as endoscopy etc.  Plan:   -Increase prilosec to 20mg BID  -Counseled extensively on follow-up precautions        Appropriate PPE worn.    Options for treatment and follow-up care were reviewed with the patient and/or guardian. Markel Padilla and/or guardian engaged in the decision making process and verbalized understanding of the options discussed and agreed with the final plan.    See [facility] for orders, medications, letters, patient instructions  This note was dictated with [place].      Jonathan Curry MD    SUBJECTIVE:  Markel Padilla is an 82 year old male with h/o GERD (patient denies) and prior smoker who presents for \"sensation in back of throat\".    Started abruptly upon awaking 2 days ago. No pain. Not improving.   No weight loss or fever.  Denies any difficulty swallowing any liquids or food and says diet has remained entirely the same.  Former smoker but quit 50-60 years ago.  Chews tobacco rarely.   Drinks small glass of wine daily.  Takes prilosec 20mg daily.   No heart burn.    PMH:   has a past medical history of Ehrlichiosis, Hemorrhage gastric, Hypercholesteremia, Hypertension, and MEDICAL HISTORY OF -.  Patient Active Problem List   Diagnosis     Erectile dysfunction     Hypertension goal BP (blood pressure) < 140/90     High cholesterol     Overweight (BMI 25.0-29.9)     Advanced directives, counseling/discussion     " Hearing loss     Sleep disturbance     Eczema     Gastroesophageal reflux disease, esophagitis presence not specified     Hearing loss, bilateral     Raynaud's disease without gangrene     Trigger finger, acquired     Edema leg     Rash     RBBB     Atrial enlargement, bilateral     Social History     Socioeconomic History     Marital status:      Spouse name: None     Number of children: None     Years of education: None     Highest education level: None   Tobacco Use     Smoking status: Former Smoker     Types: Cigarettes     Quit date: 1979     Years since quittin.9     Smokeless tobacco: Current User     Types: Chew     Last attempt to quit: 1989   Vaping Use     Vaping Use: Never used   Substance and Sexual Activity     Alcohol use: Yes     Comment: a glass of wine everyday at lunch     Drug use: No     Sexual activity: Yes     Partners: Female     Family History   Problem Relation Age of Onset     Heart Disease Mother      Lipids Mother      C.A.D. Father         age 65     Respiratory Sister         COPD     Hypertension Son      Hypertension Son        ALLERGIES:  Nkda [no known drug allergies]    Current Outpatient Medications   Medication     amLODIPine (NORVASC) 2.5 MG tablet     calcium carbonate (OS-FREDIS 500 MG [place]. [place]) 500 MG tablet     ferrous sulfate 325 (65 FE) MG tablet     FISH OIL 1000 MG OR CAPS     GLUCOSAMINE CHONDR 1500 COMPLX OR     lisinopril (ZESTRIL) 10 MG tablet     MULTI-VITAMIN OR TABS     omeprazole (PRILOSEC) 20 MG DR capsule     simvastatin (ZOCOR) 40 MG tablet     triamcinolone (KENALOG) 0.1 % external cream     aspirin 81 MG tablet     No current facility-administered medications for this visit.         ROS:  ROS is done and is negative for general/constitutional, eye, ENT, Respiratory, cardiovascular, GI, , Skin, musculoskeletal except as noted elsewhere.  All other review of systems negative except as noted elsewhere.    OBJECTIVE:  /64   Pulse 56    Temp 97.5  F (36.4  C) (Tympanic)   Resp 20   Wt 82.8 kg (182 lb 9.6 oz)   SpO2 96%   BMI 29.47 kg/m    General: Sitting comfortably. No acute distress.   HEENT: Conjunctivae are clear without discharge or erythema. Posterior pharynx clear without significant tonsillar enlargement, uvula midline, no halitosis.  Neck: No masses. No obvious adenopathy. Thyroid symmetric and nontender.  Respiratory: No respiratory distress. Lung sounds are clear.  Cardiac: Warm and well perfused. Brisk cap refill.  Abdominal: Abdomen is soft and non-tender. No masses appreciated.  Extremities: Upper and lower extremities grossly normal.  Skin: Skin warm without rashes.  Neurological: Motor function is grossly normal.   Psychiatric: Good insight and judgement.      RESULTS  No results found for any visits on 09/30/22.  No results found for this or any previous visit (from the past 48 hour(s)).

## 2023-02-01 ENCOUNTER — OFFICE VISIT (OUTPATIENT)
Dept: FAMILY MEDICINE | Facility: CLINIC | Age: 83
End: 2023-02-01
Payer: COMMERCIAL

## 2023-02-01 VITALS
HEART RATE: 80 BPM | BODY MASS INDEX: 29.99 KG/M2 | OXYGEN SATURATION: 98 % | TEMPERATURE: 97 F | DIASTOLIC BLOOD PRESSURE: 84 MMHG | RESPIRATION RATE: 18 BRPM | HEIGHT: 66 IN | SYSTOLIC BLOOD PRESSURE: 137 MMHG | WEIGHT: 186.6 LBS

## 2023-02-01 DIAGNOSIS — H61.22 IMPACTED CERUMEN OF LEFT EAR: Primary | ICD-10-CM

## 2023-02-01 DIAGNOSIS — I10 HYPERTENSION GOAL BP (BLOOD PRESSURE) < 140/90: ICD-10-CM

## 2023-02-01 PROCEDURE — 99212 OFFICE O/P EST SF 10 MIN: CPT | Mod: 25 | Performed by: PHYSICIAN ASSISTANT

## 2023-02-01 PROCEDURE — 69209 REMOVE IMPACTED EAR WAX UNI: CPT | Mod: LT | Performed by: PHYSICIAN ASSISTANT

## 2023-02-01 ASSESSMENT — PAIN SCALES - GENERAL: PAINLEVEL: NO PAIN (0)

## 2023-02-01 NOTE — PROGRESS NOTES
"  Assessment & Plan     Impacted cerumen of left ear  Left ear impacted, right clear  - DC REMOVAL IMPACTED CERUMEN IRRIGATION/LVG UNILAT  Cleared with water irrigation without complications  Hypertension goal BP (blood pressure) < 140/90  Elevated first read. Advised he is due for his annunal check with his primary and will follow up soon           BMI:   Estimated body mass index is 30.12 kg/m  as calculated from the following:    Height as of this encounter: 1.676 m (5' 6\").    Weight as of this encounter: 84.6 kg (186 lb 9.6 oz).           Return for 1 month with primary care provider Jonathan Oliver.    PRUDENCE VELEZ [facility]   Markel is a 82 year old, presenting for the following health issues:  Ear Problem      Review of Systems   Constitutional, HEENT, cardiovascular, pulmonary, gi and gu systems are negative, except as otherwise noted.      Objective    /84   Pulse 80   Temp 97  F (36.1  C) (Oral)   Resp 18   Ht 1.676 m (5' 6\")   Wt 84.6 kg (186 lb 9.6 oz)   SpO2 98%   BMI 30.12 kg/m    Body mass index is 30.12 kg/m .  Physical Exam   GENERAL: healthy, alert and no distress  HENT: Right ear canal clear and TM normal. Left ear impacted pre-procedure. Post-procedure canal clear and without erythema/edema. TM non-injured   NECK: no adenopathy, no asymmetry, masses, or scars and thyroid normal to palpation  RESP: lungs clear to auscultation - no rales, rhonchi or wheezes  CV: regular rate and rhythm, normal S1 S2, no S3 or S4, no murmur, click or rub, no peripheral edema and peripheral pulses strong                    Answers for HPI/ROS submitted by the patient on 2/1/2023  On average, how many sweetened beverages do you drink each day (Examples: soda, juice, sweet tea, etc.  Do NOT count diet or artificially sweetened beverages)?: 0  How many minutes a day do you exercise enough to make your heart beat faster?: 30 to 60  How many days a week do you " exercise enough to make your heart beat faster?: 6  What is the reason for your visit today?: Ear plugged  When did your symptoms begin?: 3-7 days ago  What are your symptoms?: ear wax flush  How would you describe these symptoms?: Moderate  Are your symptoms:: Staying the same  Have you had these symptoms before?: Yes

## 2023-03-13 DIAGNOSIS — K21.9 GASTROESOPHAGEAL REFLUX DISEASE, UNSPECIFIED WHETHER ESOPHAGITIS PRESENT: ICD-10-CM

## 2023-07-10 DIAGNOSIS — I10 HYPERTENSION GOAL BP (BLOOD PRESSURE) < 140/90: ICD-10-CM

## 2023-07-11 RX ORDER — AMLODIPINE BESYLATE 2.5 MG/1
TABLET ORAL
Qty: 90 TABLET | Refills: 1 | Status: SHIPPED | OUTPATIENT
Start: 2023-07-11 | End: 2023-07-19

## 2023-07-19 ENCOUNTER — OFFICE VISIT (OUTPATIENT)
Dept: FAMILY MEDICINE | Facility: CLINIC | Age: 83
End: 2023-07-19
Payer: COMMERCIAL

## 2023-07-19 VITALS
SYSTOLIC BLOOD PRESSURE: 160 MMHG | HEIGHT: 66 IN | DIASTOLIC BLOOD PRESSURE: 80 MMHG | RESPIRATION RATE: 14 BRPM | HEART RATE: 66 BPM | WEIGHT: 174 LBS | OXYGEN SATURATION: 99 % | TEMPERATURE: 97 F | BODY MASS INDEX: 27.97 KG/M2

## 2023-07-19 DIAGNOSIS — I10 HYPERTENSION GOAL BP (BLOOD PRESSURE) < 140/90: ICD-10-CM

## 2023-07-19 DIAGNOSIS — E78.00 HIGH CHOLESTEROL: ICD-10-CM

## 2023-07-19 DIAGNOSIS — R73.9 HYPERGLYCEMIA: ICD-10-CM

## 2023-07-19 DIAGNOSIS — Z00.00 MEDICARE ANNUAL WELLNESS VISIT, SUBSEQUENT: Primary | ICD-10-CM

## 2023-07-19 LAB
ANION GAP SERPL CALCULATED.3IONS-SCNC: 12 MMOL/L (ref 7–15)
BUN SERPL-MCNC: 15.3 MG/DL (ref 8–23)
CALCIUM SERPL-MCNC: 9.9 MG/DL (ref 8.8–10.2)
CHLORIDE SERPL-SCNC: 103 MMOL/L (ref 98–107)
CHOLEST SERPL-MCNC: 170 MG/DL
CREAT SERPL-MCNC: 0.91 MG/DL (ref 0.67–1.17)
DEPRECATED HCO3 PLAS-SCNC: 22 MMOL/L (ref 22–29)
GFR SERPL CREATININE-BSD FRML MDRD: 84 ML/MIN/1.73M2
GLUCOSE SERPL-MCNC: 109 MG/DL (ref 70–99)
HBA1C MFR BLD: 5.6 % (ref 0–5.6)
HDLC SERPL-MCNC: 64 MG/DL
LDLC SERPL CALC-MCNC: 95 MG/DL
NONHDLC SERPL-MCNC: 106 MG/DL
POTASSIUM SERPL-SCNC: 4.5 MMOL/L (ref 3.4–5.3)
SODIUM SERPL-SCNC: 137 MMOL/L (ref 136–145)
TRIGL SERPL-MCNC: 55 MG/DL

## 2023-07-19 PROCEDURE — 80048 BASIC METABOLIC PNL TOTAL CA: CPT | Performed by: PHYSICIAN ASSISTANT

## 2023-07-19 PROCEDURE — G0439 PPPS, SUBSEQ VISIT: HCPCS | Performed by: PHYSICIAN ASSISTANT

## 2023-07-19 PROCEDURE — 80061 LIPID PANEL: CPT | Performed by: PHYSICIAN ASSISTANT

## 2023-07-19 PROCEDURE — 99214 OFFICE O/P EST MOD 30 MIN: CPT | Mod: 25 | Performed by: PHYSICIAN ASSISTANT

## 2023-07-19 PROCEDURE — 36415 COLL VENOUS BLD VENIPUNCTURE: CPT | Performed by: PHYSICIAN ASSISTANT

## 2023-07-19 PROCEDURE — 83036 HEMOGLOBIN GLYCOSYLATED A1C: CPT | Performed by: PHYSICIAN ASSISTANT

## 2023-07-19 RX ORDER — AMLODIPINE BESYLATE 2.5 MG/1
2.5 TABLET ORAL DAILY
Qty: 90 TABLET | Refills: 1 | Status: SHIPPED | OUTPATIENT
Start: 2023-07-19 | End: 2024-01-26

## 2023-07-19 RX ORDER — LISINOPRIL 20 MG/1
TABLET ORAL
Qty: 90 TABLET | OUTPATIENT
Start: 2023-07-19

## 2023-07-19 RX ORDER — LISINOPRIL 20 MG/1
20 TABLET ORAL DAILY
Qty: 30 TABLET | Refills: 0 | Status: SHIPPED | OUTPATIENT
Start: 2023-07-19 | End: 2023-08-16

## 2023-07-19 RX ORDER — SIMVASTATIN 40 MG
40 TABLET ORAL AT BEDTIME
Qty: 90 TABLET | Refills: 3 | Status: SHIPPED | OUTPATIENT
Start: 2023-07-19 | End: 2024-06-04

## 2023-07-19 RX ORDER — LISINOPRIL 10 MG/1
10 TABLET ORAL DAILY
Qty: 90 TABLET | Refills: 0 | Status: CANCELLED | OUTPATIENT
Start: 2023-07-19

## 2023-07-19 ASSESSMENT — PAIN SCALES - GENERAL: PAINLEVEL: NO PAIN (0)

## 2023-07-19 NOTE — LETTER
July 20, 2023      Markel Padilla  [address] [place] [address]        Dear ,    We are writing to inform you of your test results.    Your test results fall within the expected range(s) or remain unchanged from previous results.  Please continue with current treatment plan.    Resulted Orders   BASIC METABOLIC PANEL   Result Value Ref Range    Sodium 137 136 - 145 mmol/L    Potassium 4.5 3.4 - 5.3 mmol/L    Chloride 103 98 - 107 mmol/L    Carbon Dioxide (CO2) 22 22 - 29 mmol/L    Anion Gap 12 7 - 15 mmol/L    Urea Nitrogen 15.3 8.0 - 23.0 mg/dL    Creatinine 0.91 0.67 - 1.17 mg/dL    Calcium 9.9 8.8 - 10.2 mg/dL    Glucose 109 (H) 70 - 99 mg/dL    GFR Estimate 84 >60 mL/min/1.73m2   Lipid panel reflex to direct LDL Non-fasting   Result Value Ref Range    Cholesterol 170 <200 mg/dL    Triglycerides 55 <150 mg/dL    Direct Measure HDL 64 >=40 mg/dL    LDL Cholesterol Calculated 95 <=100 mg/dL    Non HDL Cholesterol 106 <130 mg/dL    Narrative    Cholesterol  Desirable:  <200 mg/dL    Triglycerides  Normal:  Less than 150 mg/dL  Borderline High:  150-199 mg/dL  High:  200-499 mg/dL  Very High:  Greater than or equal to 500 mg/dL    Direct Measure HDL  Female:  Greater than or equal to 50 mg/dL   Male:  Greater than or equal to 40 mg/dL    LDL Cholesterol  Desirable:  <100mg/dL  Above Desirable:  100-129 mg/dL   Borderline High:  130-159 mg/dL   High:  160-189 mg/dL   Very High:  >= 190 mg/dL    Non HDL Cholesterol  Desirable:  130 mg/dL  Above Desirable:  130-159 mg/dL  Borderline High:  160-189 mg/dL  High:  190-219 mg/dL  Very High:  Greater than or equal to 220 mg/dL   Hemoglobin A1c   Result Value Ref Range    Hemoglobin A1C 5.6 0.0 - 5.6 %      Comment:      Normal <5.7%   Prediabetes 5.7-6.4%    Diabetes 6.5% or higher     Note: Adopted from ADA consensus guidelines.       If you have any questions or concerns, please call the clinic at the number listed above.        Sincerely,      Jonathan Oliver PA-C

## 2023-07-19 NOTE — PROGRESS NOTES
Assessment & Plan       ICD-10-CM    1. Medicare annual wellness visit, subsequent  Z00.00       2. Hypertension goal BP (blood pressure) < 140/90  I10 BASIC METABOLIC PANEL     amLODIPine (NORVASC) 2.5 MG tablet     lisinopril (ZESTRIL) 20 MG tablet     OFFICE/OUTPT VISIT,EST,LEVL III     BASIC METABOLIC PANEL      3. High cholesterol  E78.00 Lipid panel reflex to direct LDL Non-fasting     simvastatin (ZOCOR) 40 MG tablet     OFFICE/OUTPT VISIT,EST,LEVL III     Lipid panel reflex to direct LDL Non-fasting      4. Hyperglycemia  R73.9 Hemoglobin A1c     Hemoglobin A1c        medical conditions are stable. meds refilled.  Will increase lisinopril to 20mg daily. Recheck blood pressure in 4 wks.   warning signs discussed.  side effects discussed  Labs pending    PRUDENCE Nash [facility]   Markel is a 82 year old, presenting for the following health issues:  Recheck Medication        7/19/2023     7:00 AM   Additional Questions   Roomed by Rut   Accompanied by self         7/19/2023     7:00 AM   Patient Reported Additional Medications   Patient reports taking the following new medications no     History of Present Illness       Diabetes:   He presents for follow up of diabetes.  He is not checking blood glucose. He has no concerns regarding his diabetes at this time.  He is not experiencing numbness or burning in feet, excessive thirst, blurry vision, weight changes or redness, sores or blisters on feet.         Hypertension: He presents for follow up of hypertension.  He does check blood pressure  regularly outside of the clinic. Outside blood pressures have been over 140/90. He follows a low salt diet.     He eats 4 or more servings of fruits and vegetables daily.He consumes 0 sweetened beverage(s) daily.He exercises with enough effort to increase his heart rate 60 or more minutes per day.  He exercises with enough effort to increase his heart rate 7 days per  "week.   He is taking medications regularly.       Annual Wellness Visit    Patient has been advised of split billing requirements and indicates understanding: Yes     Are you in the first 12 months of your Medicare Part B coverage?  No    Physical Health:    In general, how would you rate your overall physical health? good    Outside of work, how many days during the week do you exercise?6-7 days/week    Outside of work, approximately how many minutes a day do you exercise?15-30 minutes    If you drink alcohol do you typically have >3 drinks per day or >7 drinks per week? No    Do you usually eat at least 4 servings of fruit and vegetables a day, include whole grains & fiber and avoid regularly eating high fat or \"junk\" foods? Yes    Do you have any problems taking medications regularly? No    Do you have any side effects from medications? none    Needs assistance for the following daily activities: no assistance needed    Which of the following safety concerns are present in your home?  none identified     Hearing impairment: Yes,     In the past 6 months, have you been bothered by leaking of urine? no    Mental Health:    In general, how would you rate your overall mental or emotional health? good  PHQ-2 Score:      Do you feel safe in your environment? Yes    Have you ever done Advance Care Planning? (For example, a Health Directive, POLST, or a discussion with a medical provider or your loved ones about your wishes)? No, advance care planning information given to patient to review.  Patient declined advance care planning discussion at this time.    Fall risk:  Fallen 2 or more times in the past year?: No  Any fall with injury in the past year?: No    Cognitive Screening: no impairment noted    Social History     Tobacco Use     Smoking status: Former     Types: Cigarettes     Quit date: 1979     Years since quittin.7     Smokeless tobacco: Current     Types: Chew     Last attempt to quit: 1989 " "  Substance Use Topics     Alcohol use: Yes     Comment: a glass of wine everyday at lunch             1/5/2022    10:40 AM   Alcohol Use   Prescreen: >3 drinks/day or >7 drinks/week? No     Do you have a current opioid prescription? No  Do you use any other controlled substances or medications that are not prescribed by a provider? None    Current providers sharing in care for this patient include:   Patient Care Team:  Jonathan Oliver PA-C as PCP - General (Family Practice)  Jonathan Oliver PA-C as Assigned PCP  Ellis Dacosta DO as Assigned Musculoskeletal Provider    Hyperlipidemia Follow-Up      Are you regularly taking any medication or supplement to lower your cholesterol?   Yes- lipitor    Are you having muscle aches or other side effects that you think could be caused by your cholesterol lowering medication?  No    Hypertension Follow-up      Do you check your blood pressure regularly outside of the clinic? Yes     Are you following a low salt diet? Yes    Are your blood pressures ever more than 140 on the top number (systolic) OR more   than 90 on the bottom number (diastolic), for example 140/90? No        Review of Systems   Constitutional, HEENT, cardiovascular, pulmonary, gi and gu systems are negative, except as otherwise noted.      Objective    BP (!) 160/80   Pulse 66   Temp 97  F (36.1  C) (Tympanic)   Resp 14   Ht 1.676 m (5' 6\")   Wt 78.9 kg (174 lb)   SpO2 99%   BMI 28.08 kg/m    Body mass index is 28.08 kg/m .  Physical Exam   GENERAL: healthy, alert and no distress  NECK: no adenopathy, no asymmetry, masses, or scars and thyroid normal to palpation  RESP: lungs clear to auscultation - no rales, rhonchi or wheezes  CV: regular rate and rhythm, normal S1 S2, no S3 or S4, no murmur, click or rub, no peripheral edema and peripheral pulses strong  ABDOMEN: soft, nontender, no hepatosplenomegaly, no masses and bowel sounds normal  MS: no gross musculoskeletal defects " noted, no edema    Labs pending

## 2023-08-16 ENCOUNTER — OFFICE VISIT (OUTPATIENT)
Dept: FAMILY MEDICINE | Facility: CLINIC | Age: 83
End: 2023-08-16
Payer: COMMERCIAL

## 2023-08-16 VITALS
TEMPERATURE: 97.9 F | OXYGEN SATURATION: 99 % | HEIGHT: 66 IN | BODY MASS INDEX: 28.12 KG/M2 | RESPIRATION RATE: 16 BRPM | SYSTOLIC BLOOD PRESSURE: 150 MMHG | WEIGHT: 175 LBS | HEART RATE: 66 BPM | DIASTOLIC BLOOD PRESSURE: 50 MMHG

## 2023-08-16 DIAGNOSIS — I10 HYPERTENSION GOAL BP (BLOOD PRESSURE) < 140/90: ICD-10-CM

## 2023-08-16 PROCEDURE — 99213 OFFICE O/P EST LOW 20 MIN: CPT | Performed by: PHYSICIAN ASSISTANT

## 2023-08-16 RX ORDER — LISINOPRIL 20 MG/1
20 TABLET ORAL DAILY
Qty: 90 TABLET | Refills: 1 | Status: SHIPPED | OUTPATIENT
Start: 2023-08-16 | End: 2024-02-09

## 2023-08-16 ASSESSMENT — PAIN SCALES - GENERAL: PAINLEVEL: NO PAIN (0)

## 2023-08-16 NOTE — PROGRESS NOTES
"  Assessment & Plan       ICD-10-CM    1. Hypertension goal BP (blood pressure) < 140/90  I10 lisinopril (ZESTRIL) 20 MG tablet        medical conditions are stable. meds refilled.  warning signs discussed.  Recheck 6 months  PRUDENCE Nash [facility]   Markel is a 83 year old, presenting for the following health issues:  Follow Up and Hypertension (BP Recheck)    History of Present Illness       Reason for visit:  BP Recheck    He eats 4 or more servings of fruits and vegetables daily.He consumes 1 sweetened beverage(s) daily.He exercises with enough effort to increase his heart rate 30 to 60 minutes per day.  He exercises with enough effort to increase his heart rate 7 days per week.   He is taking medications regularly.       Hypertension Follow-up    Do you check your blood pressure regularly outside of the clinic? Yes   Are you following a low salt diet? Yes  Are your blood pressures ever more than 140 on the top number (systolic) OR more   than 90 on the bottom number (diastolic), for example 140/90? No  No chest pain or short of breath. No headache or dizziness.   Blood pressure readings at home are below 140/90    Review of Systems   Constitutional, HEENT, cardiovascular, pulmonary, gi and gu systems are negative, except as otherwise noted.      Objective    BP (!) 150/50   Pulse 66   Temp 97.9  F (36.6  C) (Tympanic)   Resp 16   Ht 1.676 m (5' 6\")   Wt 79.4 kg (175 lb)   SpO2 99%   BMI 28.25 kg/m    Body mass index is 28.25 kg/m .  Physical Exam   GENERAL: healthy, alert and no distress  RESP: lungs clear to auscultation - no rales, rhonchi or wheezes  CV: regular rate and rhythm, normal S1 S2, no S3 or S4, no murmur, click or rub, no peripheral edema and peripheral pulses strong  MS: no gross musculoskeletal defects noted, no edema              "

## 2023-09-28 DIAGNOSIS — E78.00 HIGH CHOLESTEROL: ICD-10-CM

## 2023-09-28 RX ORDER — SIMVASTATIN 40 MG
40 TABLET ORAL AT BEDTIME
Qty: 90 TABLET | Refills: 3 | OUTPATIENT
Start: 2023-09-28

## 2023-09-28 NOTE — TELEPHONE ENCOUNTER
Patient wife, Jaquelin calling in regards to refill denied of simvastatin (ZOCOR) 40 MG tablet. No CTC on file. Patient given verbal consent to speak with spouse.    Patient has refills on file of the medication. Advised to call pharmacy for refills. Refills sent in on 7/19/23 for a year supply.    Bonny Henriquez RN

## 2023-12-05 DIAGNOSIS — K21.9 GASTROESOPHAGEAL REFLUX DISEASE, UNSPECIFIED WHETHER ESOPHAGITIS PRESENT: ICD-10-CM

## 2024-01-26 DIAGNOSIS — I10 HYPERTENSION GOAL BP (BLOOD PRESSURE) < 140/90: ICD-10-CM

## 2024-01-26 RX ORDER — AMLODIPINE BESYLATE 2.5 MG/1
2.5 TABLET ORAL DAILY
Qty: 90 TABLET | Refills: 1 | Status: SHIPPED | OUTPATIENT
Start: 2024-01-26 | End: 2024-06-04

## 2024-02-09 DIAGNOSIS — I10 HYPERTENSION GOAL BP (BLOOD PRESSURE) < 140/90: ICD-10-CM

## 2024-02-09 RX ORDER — LISINOPRIL 20 MG/1
20 TABLET ORAL DAILY
Qty: 90 TABLET | Refills: 1 | Status: SHIPPED | OUTPATIENT
Start: 2024-02-09 | End: 2024-06-04

## 2024-03-19 ENCOUNTER — OFFICE VISIT (OUTPATIENT)
Dept: FAMILY MEDICINE | Facility: CLINIC | Age: 84
End: 2024-03-19
Payer: COMMERCIAL

## 2024-03-19 VITALS
HEART RATE: 82 BPM | WEIGHT: 186 LBS | RESPIRATION RATE: 16 BRPM | DIASTOLIC BLOOD PRESSURE: 72 MMHG | OXYGEN SATURATION: 98 % | TEMPERATURE: 96 F | BODY MASS INDEX: 30.02 KG/M2 | SYSTOLIC BLOOD PRESSURE: 132 MMHG

## 2024-03-19 DIAGNOSIS — R21 RASH: ICD-10-CM

## 2024-03-19 DIAGNOSIS — I10 HYPERTENSION GOAL BP (BLOOD PRESSURE) < 140/90: Primary | ICD-10-CM

## 2024-03-19 DIAGNOSIS — R06.09 EXERTIONAL DYSPNEA: ICD-10-CM

## 2024-03-19 LAB
ANION GAP SERPL CALCULATED.3IONS-SCNC: 9 MMOL/L (ref 7–15)
BUN SERPL-MCNC: 10.9 MG/DL (ref 8–23)
CALCIUM SERPL-MCNC: 9.4 MG/DL (ref 8.8–10.2)
CHLORIDE SERPL-SCNC: 103 MMOL/L (ref 98–107)
CREAT SERPL-MCNC: 0.92 MG/DL (ref 0.67–1.17)
DEPRECATED HCO3 PLAS-SCNC: 25 MMOL/L (ref 22–29)
EGFRCR SERPLBLD CKD-EPI 2021: 83 ML/MIN/1.73M2
GLUCOSE SERPL-MCNC: 158 MG/DL (ref 70–99)
POTASSIUM SERPL-SCNC: 4.3 MMOL/L (ref 3.4–5.3)
SODIUM SERPL-SCNC: 137 MMOL/L (ref 135–145)

## 2024-03-19 PROCEDURE — 99213 OFFICE O/P EST LOW 20 MIN: CPT | Performed by: PHYSICIAN ASSISTANT

## 2024-03-19 PROCEDURE — 80048 BASIC METABOLIC PNL TOTAL CA: CPT | Performed by: PHYSICIAN ASSISTANT

## 2024-03-19 PROCEDURE — 36415 COLL VENOUS BLD VENIPUNCTURE: CPT | Performed by: PHYSICIAN ASSISTANT

## 2024-03-19 RX ORDER — CLOBETASOL PROPIONATE 0.5 MG/G
CREAM TOPICAL 2 TIMES DAILY
Status: SHIPPED
Start: 2024-03-19 | End: 2024-09-18

## 2024-03-19 ASSESSMENT — PAIN SCALES - GENERAL: PAINLEVEL: NO PAIN (0)

## 2024-03-19 NOTE — PROGRESS NOTES
Assessment & Plan       ICD-10-CM    1. Hypertension goal BP (blood pressure) < 140/90  I10 BASIC METABOLIC PANEL     Adult Cardiology Eval  Referral     BASIC METABOLIC PANEL      2. Rash  R21 clobetasol propionate (TEMOVATE) 0.05 % external cream      3. Exertional dyspnea  R06.09 Adult Cardiology Eval  Referral      medical conditions are stable. meds refilled.  Ok for refills when needed.   Follow up  with cardiology at patient request. Deferred any other testing at this time.       Maddison Jean Baptiste is a 83 year old, presenting for the following health issues:  Recheck Medication        3/19/2024     8:37 AM   Additional Questions   Roomed by sanjiv   Accompanied by self         3/19/2024     8:37 AM   Patient Reported Additional Medications   Patient reports taking the following new medications no     History of Present Illness       Reason for visit:  Med    He eats 2-3 servings of fruits and vegetables daily.He consumes 0 sweetened beverage(s) daily.He exercises with enough effort to increase his heart rate 9 or less minutes per day.  He exercises with enough effort to increase his heart rate 3 or less days per week.   He is taking medications regularly.         Hypertension Follow-up    Do you check your blood pressure regularly outside of the clinic? Yes   Are you following a low salt diet? Yes  Are your blood pressures ever more than 140 on the top number (systolic) OR more   than 90 on the bottom number (diastolic), for example 140/90? No    Occ leg rash that he uses steroid crm that he got from dermatology.   Not due for refills at this time. But may need them in the future.       Review of Systems  Constitutional, HEENT, cardiovascular, pulmonary, gi and gu systems are negative, except as otherwise noted.      Objective    /72   Pulse 82   Temp (!) 96  F (35.6  C) (Oral)   Resp 16   Wt 84.4 kg (186 lb)   SpO2 98%   BMI 30.02 kg/m    Body mass index is 30.02  The H&P has been reviewed, the patient was examined, and no significant change has occurred in the patient's condition since the H&P was completed. Risks, benefits, and alternatives to surgery have been discussed with the patient and the patient elected to proceed. kg/m .  Physical Exam   GENERAL: alert and no distress  RESP: lungs clear to auscultation - no rales, rhonchi or wheezes  CV: regular rate and rhythm, normal S1 S2, no S3 or S4, no murmur, click or rub, no peripheral edema  ABDOMEN: soft, nontender, no hepatosplenomegaly, no masses and bowel sounds normal  MS: no gross musculoskeletal defects noted, no edema    Labs pending        Signed Electronically by: Jonathan Oliver PA-C

## 2024-05-01 ENCOUNTER — OFFICE VISIT (OUTPATIENT)
Dept: CARDIOLOGY | Facility: CLINIC | Age: 84
End: 2024-05-01
Attending: PHYSICIAN ASSISTANT
Payer: COMMERCIAL

## 2024-05-01 ENCOUNTER — TELEPHONE (OUTPATIENT)
Dept: CARDIOLOGY | Facility: CLINIC | Age: 84
End: 2024-05-01

## 2024-05-01 VITALS
DIASTOLIC BLOOD PRESSURE: 74 MMHG | SYSTOLIC BLOOD PRESSURE: 167 MMHG | WEIGHT: 187.4 LBS | OXYGEN SATURATION: 100 % | HEART RATE: 50 BPM | BODY MASS INDEX: 30.25 KG/M2

## 2024-05-01 DIAGNOSIS — R01.1 HEART MURMUR: ICD-10-CM

## 2024-05-01 DIAGNOSIS — I10 HYPERTENSION GOAL BP (BLOOD PRESSURE) < 140/90: ICD-10-CM

## 2024-05-01 DIAGNOSIS — R53.83 TIREDNESS: Primary | ICD-10-CM

## 2024-05-01 LAB
ERYTHROCYTE [DISTWIDTH] IN BLOOD BY AUTOMATED COUNT: 12.1 % (ref 10–15)
HCT VFR BLD AUTO: 42.6 % (ref 40–53)
HGB BLD-MCNC: 14.7 G/DL (ref 13.3–17.7)
MCH RBC QN AUTO: 33 PG (ref 26.5–33)
MCHC RBC AUTO-ENTMCNC: 34.5 G/DL (ref 31.5–36.5)
MCV RBC AUTO: 96 FL (ref 78–100)
PLATELET # BLD AUTO: 143 10E3/UL (ref 150–450)
RBC # BLD AUTO: 4.46 10E6/UL (ref 4.4–5.9)
WBC # BLD AUTO: 9.1 10E3/UL (ref 4–11)

## 2024-05-01 PROCEDURE — 99202 OFFICE O/P NEW SF 15 MIN: CPT | Mod: GC | Performed by: INTERNAL MEDICINE

## 2024-05-01 PROCEDURE — 93000 ELECTROCARDIOGRAM COMPLETE: CPT | Performed by: INTERNAL MEDICINE

## 2024-05-01 PROCEDURE — 80048 BASIC METABOLIC PNL TOTAL CA: CPT | Performed by: INTERNAL MEDICINE

## 2024-05-01 PROCEDURE — 85027 COMPLETE CBC AUTOMATED: CPT | Performed by: INTERNAL MEDICINE

## 2024-05-01 PROCEDURE — 84443 ASSAY THYROID STIM HORMONE: CPT | Performed by: INTERNAL MEDICINE

## 2024-05-01 PROCEDURE — 36415 COLL VENOUS BLD VENIPUNCTURE: CPT | Performed by: INTERNAL MEDICINE

## 2024-05-01 NOTE — PROGRESS NOTES
"                                                                    [facility]      Referring provider:Jonathan Oliver PA-C     HPI: Mr. Markel Padilla is a 83 year old  male with PMH significant for   -Hypertension    Patient is being seen today for hypertension and tiredness with exertion.  No prior history of cardiac disease.  Patient reports that he has been been \"slowing down\" over the last year. Says that usually on a treadmill he can walk 1/2 mile without difficulty in ~15 mins, but when he walks outside, he gets tired trying to walk the same distance. No shortness of breath, predominant symptom is fatigue. No angina, palpitations or syncope. No leg swelling/PND/orthopnea. BP elevated today at 180/77, says this is because he was stressed after driving in traffic. At home, BP is usually 120/60.   Current cardiac medications include amlodipine 2.5 mg daily, lisinopril 20 mg and simvastatin 40 mg. Says he stopped taking ASA 81 mg ~2 years ago because he was bruising frequently.     He says he quit smoking 40 years ago, prior to that was smoking 20 PPY prior to that. Has a glass of wine with dinner. No other drug use.     No prior cardiac imaging.    Medications, personal, family, and social history reviewed with patient and revised.    PAST MEDICAL HISTORY:  Past Medical History:   Diagnosis Date    Ehrlichiosis     Hemorrhage gastric     Hypercholesteremia     Hypertension     MEDICAL HISTORY OF -     major forearm laceration       CURRENT MEDICATIONS:  Current Outpatient Medications   Medication Sig Dispense Refill    amLODIPine (NORVASC) 2.5 MG tablet TAKE 1 TABLET(2.5 MG) BY MOUTH DAILY 90 tablet 1    aspirin 81 MG tablet Take 1 tablet by mouth daily      calcium carbonate (OS-FREDIS 500 MG [place]. [place]) 500 MG tablet Take 1 tablet (500 mg) by mouth 2 times daily 180 tablet 3    clobetasol propionate (TEMOVATE) 0.05 % external cream Apply topically 2 times daily      FISH OIL 1000 " MG OR CAPS 1 tablet daily      GLUCOSAMINE CHONDR 1500 COMPLX OR 1 tablet daily      lisinopril (ZESTRIL) 20 MG tablet TAKE 1 TABLET(20 MG) BY MOUTH DAILY 90 tablet 1    MULTI-VITAMIN OR TABS 1 TABLET DAILY      omeprazole (PRILOSEC) 20 MG DR capsule TAKE 1 CAPSULE(20 MG) BY MOUTH DAILY 90 capsule 2    simvastatin (ZOCOR) 40 MG tablet Take 1 tablet (40 mg) by mouth At Bedtime 90 tablet 3    triamcinolone (KENALOG) 0.1 % external cream APPLY EXTERNALLY TO THE AFFECTED AREA TWICE DAILY AS NEEDED FOR IRRITATION 80 g 0       PAST SURGICAL HISTORY:  Past Surgical History:   Procedure Laterality Date    NO HISTORY OF SURGERY         ALLERGIES:     Allergies   Allergen Reactions    Nkda [No Known Drug Allergy]        FAMILY HISTORY:  Family History   Problem Relation Age of Onset    Heart Disease Mother     Lipids Mother     C.A.D. Father         age 65    Respiratory Sister         COPD    Hypertension Son     Hypertension Son          SOCIAL HISTORY:  Social History     Tobacco Use    Smoking status: Former     Current packs/day: 0.00     Types: Cigarettes     Quit date: 1979     Years since quittin.5    Smokeless tobacco: Current     Types: Chew     Last attempt to quit: 1989   Vaping Use    Vaping status: Never Used   Substance Use Topics    Alcohol use: Yes     Comment: a glass of wine everyday at lunch    Drug use: No       ROS:   Constitutional: No fever, chills, or sweats. Weight stable.   Cardiovascular: As per HPI.       Exam:  BP (!) 167/74 (BP Location: Right arm, Patient Position: Sitting, Cuff Size: Adult Regular)   Pulse 50   Wt 85 kg (187 lb 6.4 oz)   SpO2 100%   BMI 30.25 kg/m    GENERAL APPEARANCE: alert and no distress  HEENT: no icterus, no central cyanosis  LYMPH/NECK: no adenopathy, no asymmetry, JVP not elevated.  RESPIRATORY: lungs clear to auscultation - no rales, rhonchi or wheezes, no use of accessory muscles, no retractions, respirations are unlabored, normal respiratory  rate  CARDIOVASCULAR: regular rhythm, normal S1, S2, no S3 or S4, faint systolic murmur in aortic area, click or rub, precordium quiet with normal PMI.  GI: soft, non tender  EXTREMITIES: no edema  NEURO: alert, normal speech,and affect  SKIN: no ecchymoses, no rashes     I have reviewed the labs and personally reviewed the imaging below and made my comment in the assessment and plan.    Labs:  CBC RESULTS:   Lab Results   Component Value Date    WBC 10.3 01/26/2022    WBC 8.1 11/25/2020    RBC 4.54 01/26/2022    RBC 4.78 11/25/2020    HGB 14.9 01/26/2022    HGB 15.5 11/25/2020    HCT 43.2 01/26/2022    HCT 45.4 11/25/2020    MCV 95 01/26/2022    MCV 95 11/25/2020    MCH 32.8 01/26/2022    MCH 32.4 11/25/2020    MCHC 34.5 01/26/2022    MCHC 34.1 11/25/2020    RDW 12.6 01/26/2022    RDW 12.8 11/25/2020     01/26/2022     (L) 11/25/2020       BMP RESULTS:  Lab Results   Component Value Date     03/19/2024     02/17/2021    POTASSIUM 4.3 03/19/2024    POTASSIUM 4.7 06/15/2022    POTASSIUM 4.2 02/17/2021    CHLORIDE 103 03/19/2024    CHLORIDE 106 06/15/2022    CHLORIDE 105 02/17/2021    CO2 25 03/19/2024    CO2 26 06/15/2022    CO2 26 02/17/2021    ANIONGAP 9 03/19/2024    ANIONGAP 5 06/15/2022    ANIONGAP 5 02/17/2021     (H) 03/19/2024     (H) 06/15/2022     (H) 02/17/2021    BUN 10.9 03/19/2024    BUN 11 06/15/2022    BUN 12 02/17/2021    CR 0.92 03/19/2024    CR 0.99 02/17/2021    GFRESTIMATED 83 03/19/2024    GFRESTIMATED >60 01/21/2022    GFRESTIMATED 71 02/17/2021    GFRESTBLACK 83 02/17/2021    FREIDS 9.4 03/19/2024    FREDIS 9.1 02/17/2021      Reviewed EKG in clinic today which shows sinus bradycardia 53 bpm, right bundle branch block unchanged compared to prior EKG.          Echocardiogram  No results found for this or any previous visit (from the past 8760 hour(s)).      Assessment and Plan:   The patient is a 83 year old  male with PMH significant for hypertension  presenting to Cardiology clinic to establish care.  He denies any cardiac symptoms currently, no angina, no shortness of breath.  He reports some fatigue with his usual activities, however his baseline exercise tolerance is great for his age, greater than 4 METS.     # Fatigue  -He has normal functional capacity.  He is able to mow the lawn, go up and down the stairs without any symptoms.  He tells me that he feels tired after these activities but does not have any symptoms during these activities.  Unlikely cardiac.  Patient not reporting anginal symptoms.  No history of diabetes, LDL less than 100, quit smoking 40 years ago.  Low suspicion for ischemic heart disease at this time.  -EKG in clinic today shows sinus rhythm, sinus bradycardia 53 bpm, right bundle branch block with no ischemic changes.  Normal [place] prior E[place] 2 years ago had right bundle branch block and frequent PACs.  -Given faint murmur heard in aortic area, will order limited echo to rule out aortic stenosis, although unlikely that this is currently significant enough to be causing him symptoms.  -No recent updated hemoglobin or TSH.  Will order these tests to rule out anemia and thyroid issues.    # Hypertension  -Continue lisinopril 20 mg and amlodipine 2.5 mg daily, blood pressure elevated in clinic today to 167/77, however patient says it is usually well-controlled at home (checks every day and is usually around 120/70 mmHg).  He tells me that he is anxious today coming here.  He is going to go home today, check blood pressure at home and will give us a call.    No medication changes today.  Return to clinic as needed.    Jina Leiva MD  PGY-4 Cardiology    Attending note 5/1/2024:     I, Anai Yin MD, saw this patient with the cardiology fellow and agree with the fellow's findings and plan of care as documented in the note.       I personally reviewed vital signs, medications, labs, imaging, and ECG .     The history and physical findings are  accurate as recorded. My additional findings, if any, have been incorporated into the body of the note. The assessment and plans outlined reflect our joint decision making.     Total time spent today for this visit is 20 minutes including precharting, face-to-face clinic visit, review of labs/imaging and medical documentation.     Anai MANRIQUE MD  [facility] Division of Cardiology  Pager [phone]

## 2024-05-01 NOTE — LETTER
May 1, 2024      Markel SAM Padilla  [address]  [place] [address]        Dear ,    We are writing to inform you of your test results.    Normal blood count.     DR MANRIQUE     Resulted Orders   CBC with platelets   Result Value Ref Range    WBC Count 9.1 4.0 - 11.0 10e3/uL    RBC Count 4.46 4.40 - 5.90 10e6/uL    Hemoglobin 14.7 13.3 - 17.7 g/dL    Hematocrit 42.6 40.0 - 53.0 %    MCV 96 78 - 100 fL    MCH 33.0 26.5 - 33.0 pg    MCHC 34.5 31.5 - 36.5 g/dL    RDW 12.1 10.0 - 15.0 %    Platelet Count 143 (L) 150 - 450 10e3/uL       If you have any questions or concerns, please call the clinic at the number listed above.       Sincerely,      Anai Manrique MD

## 2024-05-01 NOTE — TELEPHONE ENCOUNTER
Anai Yin MD  You6 minutes ago (1:52 PM)     IC  No. This is good.     You  Anai Yin MD46 minutes ago (1:12 PM)     CK  BP significantly better at home. 117/64. Do you want me to just tell him to keep monitoring bps at home and update if running high?     Called and updated patient. No questions at this time.    Heaven Rodriguez, RN, BSN  Cardiology RN Care Coordinator   [place]   Phone: [phone]  Fax: [phone] ([place]) [phone] ([place])     Modified Advancement Flap Text: The defect edges were debeveled with a #15 scalpel blade.  Given the location of the defect, shape of the defect and the proximity to free margins a modified advancement flap was deemed most appropriate.  Using a sterile surgical marker, an appropriate advancement flap was drawn incorporating the defect and placing the expected incisions within the relaxed skin tension lines where possible.    The area thus outlined was incised deep to adipose tissue with a #15 scalpel blade.  The skin margins were undermined to an appropriate distance in all directions utilizing iris scissors.

## 2024-05-01 NOTE — PATIENT INSTRUCTIONS
Thank you for coming to the [facility] at [place]; please note the following instructions:    1. Echocardiogram    2. EKG in clinic today    3. Follow-up as needed/to be determined by testing    4. Lab work today    5. Check your blood pressure today when you go home and call us with the number. Please call [phone].    When checking your blood pressure at home:  ~upper arm cuff is preferred versus wrist cuff due to accuracy  ~Sit in a chair  ~Rest for 5 minutes  ~Avoid alcohol, nicotine, caffeine, exercise, food or drink 30 minutes prior  ~urinate prior to checking if needed  ~Elbow is at about heart level  ~Feet flat on the floor, no crossing legs or feet  ~No talking, minimal movement   ~Place cuff on bare skin          If you have any questions regarding your visit, please contact your care team:     CARDIOLOGY  TELEPHONE NUMBER   [place]ia WESTBROOK, Registered Nurse  Heaven RITCHIE, Registered Nurse  Coby LAGUERRE, Registered Medical Assistant  Elizabeth SANDRA, Certified Medical Assistant  Meera SOLIS, Clinic Assistant [phone] (select option 1)    *After hours: [phone]   For Scheduling Appts:     [phone] (select option 1)    *After hours: [phone]   For the Device Clinic (Pacemakers and ICD's)  Jazmin MCCARTHY, Registered Nurse   During business hours: [phone]    *After business hours:  [phone] (select option 4)      Normal test result notifications will be released via [employer] or mailed within 7 business days.  All other test results, will be communicated via telephone once reviewed by your cardiologist.    If you need a medication refill, please contact your pharmacy.  Please allow 3 business days for your refill to be completed.    As always, thank you for trusting us with your health care needs!

## 2024-05-01 NOTE — LETTER
May 2, 2024      Markel Padilla  [address] [address]        Dear ,    We are writing to inform you of your test results.    All your blood tests are normal.   DR MANRIQUE     Resulted Orders   CBC with platelets   Result Value Ref Range    WBC Count 9.1 4.0 - 11.0 10e3/uL    RBC Count 4.46 4.40 - 5.90 10e6/uL    Hemoglobin 14.7 13.3 - 17.7 g/dL    Hematocrit 42.6 40.0 - 53.0 %    MCV 96 78 - 100 fL    MCH 33.0 26.5 - 33.0 pg    MCHC 34.5 31.5 - 36.5 g/dL    RDW 12.1 10.0 - 15.0 %    Platelet Count 143 (L) 150 - 450 10e3/uL   TSH with free T4 reflex   Result Value Ref Range    TSH 1.10 0.30 - 4.20 uIU/mL   Basic metabolic panel   Result Value Ref Range    Sodium 138 135 - 145 mmol/L      Comment:      Reference intervals for this test were updated on 09/26/2023 to more accurately reflect our healthy population. There may be differences in the flagging of prior results with similar values performed with this method. Interpretation of those prior results can be made in the context of the updated reference intervals.     Potassium 4.5 3.4 - 5.3 mmol/L    Chloride 104 98 - 107 mmol/L    Carbon Dioxide (CO2) 25 22 - 29 mmol/L    Anion Gap 9 7 - 15 mmol/L    Urea Nitrogen 9.9 8.0 - 23.0 mg/dL    Creatinine 0.87 0.67 - 1.17 mg/dL    GFR Estimate 86 >60 mL/min/1.73m2    Calcium 9.4 8.8 - 10.2 mg/dL    Glucose 97 70 - 99 mg/dL     If you have any questions or concerns, please call the clinic at the number listed above.       Sincerely,      Anai Manrique MD

## 2024-05-01 NOTE — LETTER
"5/1/2024      RE: Markel Padilla  [address] [place] [address]       Dear Colleague,    Thank you for the opportunity to participate in the care of your patient, Markel Padilla, at the [facility]. Please see a copy of my visit note below.                                                                        General Cardiology Clinic-[place]      Referring provider:Jonathan Oliver PA-C     HPI: Mr. Markel Padilla is a 83 year old  male with PMH significant for   -Hypertension    Patient is being seen today for hypertension and tiredness with exertion.  No prior history of cardiac disease.  Patient reports that he has been been \"slowing down\" over the last year. Says that usually on a treadmill he can walk 1/2 mile without difficulty in ~15 mins, but when he walks outside, he gets tired trying to walk the same distance. No shortness of breath, predominant symptom is fatigue. No angina, palpitations or syncope. No leg swelling/PND/orthopnea. BP elevated today at 180/77, says this is because he was stressed after driving in traffic. At home, BP is usually 120/60.   Current cardiac medications include amlodipine 2.5 mg daily, lisinopril 20 mg and simvastatin 40 mg. Says he stopped taking ASA 81 mg ~2 years ago because he was bruising frequently.     He says he quit smoking 40 years ago, prior to that was smoking 20 PPY prior to that. Has a glass of wine with dinner. No other drug use.     No prior cardiac imaging.    Medications, personal, family, and social history reviewed with patient and revised.    PAST MEDICAL HISTORY:  Past Medical History:   Diagnosis Date     Ehrlichiosis      Hemorrhage gastric      Hypercholesteremia      Hypertension      MEDICAL HISTORY OF -     major forearm laceration       CURRENT MEDICATIONS:  Current Outpatient Medications   Medication Sig Dispense Refill     amLODIPine " (NORVASC) 2.5 MG tablet TAKE 1 TABLET(2.5 MG) BY MOUTH DAILY 90 tablet 1     aspirin 81 MG tablet Take 1 tablet by mouth daily       calcium carbonate (OS-FREDIS 500 MG [place]. [place]) 500 MG tablet Take 1 tablet (500 mg) by mouth 2 times daily 180 tablet 3     clobetasol propionate (TEMOVATE) 0.05 % external cream Apply topically 2 times daily       FISH OIL 1000 MG OR CAPS 1 tablet daily       GLUCOSAMINE CHONDR 1500 COMPLX OR 1 tablet daily       lisinopril (ZESTRIL) 20 MG tablet TAKE 1 TABLET(20 MG) BY MOUTH DAILY 90 tablet 1     MULTI-VITAMIN OR TABS 1 TABLET DAILY       omeprazole (PRILOSEC) 20 MG DR capsule TAKE 1 CAPSULE(20 MG) BY MOUTH DAILY 90 capsule 2     simvastatin (ZOCOR) 40 MG tablet Take 1 tablet (40 mg) by mouth At Bedtime 90 tablet 3     triamcinolone (KENALOG) 0.1 % external cream APPLY EXTERNALLY TO THE AFFECTED AREA TWICE DAILY AS NEEDED FOR IRRITATION 80 g 0       PAST SURGICAL HISTORY:  Past Surgical History:   Procedure Laterality Date     NO HISTORY OF SURGERY         ALLERGIES:     Allergies   Allergen Reactions     Nkda [No Known Drug Allergy]        FAMILY HISTORY:  Family History   Problem Relation Age of Onset     Heart Disease Mother      Lipids Mother      C.A.D. Father         age 65     Respiratory Sister         COPD     Hypertension Son      Hypertension Son          SOCIAL HISTORY:  Social History     Tobacco Use     Smoking status: Former     Current packs/day: 0.00     Types: Cigarettes     Quit date: 1979     Years since quittin.5     Smokeless tobacco: Current     Types: Chew     Last attempt to quit: 1989   Vaping Use     Vaping status: Never Used   Substance Use Topics     Alcohol use: Yes     Comment: a glass of wine everyday at lunch     Drug use: No       ROS:   Constitutional: No fever, chills, or sweats. Weight stable.   Cardiovascular: As per HPI.       Exam:  BP (!) 167/74 (BP Location: Right arm, Patient Position: Sitting, Cuff Size: Adult Regular)   Pulse 50    Wt 85 kg (187 lb 6.4 oz)   SpO2 100%   BMI 30.25 kg/m    GENERAL APPEARANCE: alert and no distress  HEENT: no icterus, no central cyanosis  LYMPH/NECK: no adenopathy, no asymmetry, JVP not elevated.  RESPIRATORY: lungs clear to auscultation - no rales, rhonchi or wheezes, no use of accessory muscles, no retractions, respirations are unlabored, normal respiratory rate  CARDIOVASCULAR: regular rhythm, normal S1, S2, no S3 or S4, faint systolic murmur in aortic area, click or rub, precordium quiet with normal PMI.  GI: soft, non tender  EXTREMITIES: no edema  NEURO: alert, normal speech,and affect  SKIN: no ecchymoses, no rashes     I have reviewed the labs and personally reviewed the imaging below and made my comment in the assessment and plan.    Labs:  CBC RESULTS:   Lab Results   Component Value Date    WBC 10.3 01/26/2022    WBC 8.1 11/25/2020    RBC 4.54 01/26/2022    RBC 4.78 11/25/2020    HGB 14.9 01/26/2022    HGB 15.5 11/25/2020    HCT 43.2 01/26/2022    HCT 45.4 11/25/2020    MCV 95 01/26/2022    MCV 95 11/25/2020    MCH 32.8 01/26/2022    MCH 32.4 11/25/2020    MCHC 34.5 01/26/2022    MCHC 34.1 11/25/2020    RDW 12.6 01/26/2022    RDW 12.8 11/25/2020     01/26/2022     (L) 11/25/2020       BMP RESULTS:  Lab Results   Component Value Date     03/19/2024     02/17/2021    POTASSIUM 4.3 03/19/2024    POTASSIUM 4.7 06/15/2022    POTASSIUM 4.2 02/17/2021    CHLORIDE 103 03/19/2024    CHLORIDE 106 06/15/2022    CHLORIDE 105 02/17/2021    CO2 25 03/19/2024    CO2 26 06/15/2022    CO2 26 02/17/2021    ANIONGAP 9 03/19/2024    ANIONGAP 5 06/15/2022    ANIONGAP 5 02/17/2021     (H) 03/19/2024     (H) 06/15/2022     (H) 02/17/2021    BUN 10.9 03/19/2024    BUN 11 06/15/2022    BUN 12 02/17/2021    CR 0.92 03/19/2024    CR 0.99 02/17/2021    GFRESTIMATED 83 03/19/2024    GFRESTIMATED >60 01/21/2022    GFRESTIMATED 71 02/17/2021    GFRESTBLACK 83 02/17/2021    FREDIS 9.4  03/19/2024    FREDIS 9.1 02/17/2021      Reviewed EKG in clinic today which shows sinus bradycardia 53 bpm, right bundle branch block unchanged compared to prior EKG.          Echocardiogram  No results found for this or any previous visit (from the past 8760 hour(s)).      Assessment and Plan:   The patient is a 83 year old  male with PMH significant for hypertension presenting to Cardiology clinic to establish care.  He denies any cardiac symptoms currently, no angina, no shortness of breath.  He reports some fatigue with his usual activities, however his baseline exercise tolerance is great for his age, greater than 4 METS.     # Fatigue  -He has normal functional capacity.  He is able to mow the lawn, go up and down the stairs without any symptoms.  He tells me that he feels tired after these activities but does not have any symptoms during these activities.  Unlikely cardiac.  Patient not reporting anginal symptoms.  No history of diabetes, LDL less than 100, quit smoking 40 years ago.  Low suspicion for ischemic heart disease at this time.  -EKG in clinic today shows sinus rhythm, sinus bradycardia 53 bpm, right bundle branch block with no ischemic changes.  Normal [place] prior EKG 2 years ago had right bundle branch block and frequent PACs.  -Given faint murmur heard in aortic area, will order limited echo to rule out aortic stenosis, although unlikely that this is currently significant enough to be causing him symptoms.  -No recent updated hemoglobin or TSH.  Will order these tests to rule out anemia and thyroid issues.    # Hypertension  -Continue lisinopril 20 mg and amlodipine 2.5 mg daily, blood pressure elevated in clinic today to 167/77, however patient says it is usually well-controlled at home (checks every day and is usually around 120/70 mmHg).  He tells me that he is anxious today coming here.  He is going to go home today, check blood pressure at home and will give us a call.    No medication changes  today.  Return to clinic as needed.    Jina Leiva MD  PGY-4 Cardiology    Attending note 5/1/2024:     I, Anai Manrique MD, saw this patient with the cardiology fellow and agree with the fellow's findings and plan of care as documented in the note.       I personally reviewed vital signs, medications, labs, imaging, and ECG .     The history and physical findings are accurate as recorded. My additional findings, if any, have been incorporated into the body of the note. The assessment and plans outlined reflect our joint decision making.     Total time spent today for this visit is 20 minutes including precharting, face-to-face clinic visit, review of labs/imaging and medical documentation.     Anai MANRIQUE MD  [facility] Division of Cardiology  Pager [phone]       Please do not hesitate to contact me if you have any questions/concerns.     Sincerely,     Anai Manrique MD

## 2024-05-01 NOTE — TELEPHONE ENCOUNTER
[facility] Call Center    Phone Message    May a detailed message be left on voicemail: yes     Reason for Call: Other: BP today about 11:45 am 117/64 right arm sitting down      Action Taken: Other: Cardiology    Travel Screening: Not Applicable    Thank you!  [facility]

## 2024-05-02 LAB
ANION GAP SERPL CALCULATED.3IONS-SCNC: 9 MMOL/L (ref 7–15)
BUN SERPL-MCNC: 9.9 MG/DL (ref 8–23)
CALCIUM SERPL-MCNC: 9.4 MG/DL (ref 8.8–10.2)
CHLORIDE SERPL-SCNC: 104 MMOL/L (ref 98–107)
CREAT SERPL-MCNC: 0.87 MG/DL (ref 0.67–1.17)
DEPRECATED HCO3 PLAS-SCNC: 25 MMOL/L (ref 22–29)
EGFRCR SERPLBLD CKD-EPI 2021: 86 ML/MIN/1.73M2
GLUCOSE SERPL-MCNC: 97 MG/DL (ref 70–99)
POTASSIUM SERPL-SCNC: 4.5 MMOL/L (ref 3.4–5.3)
SODIUM SERPL-SCNC: 138 MMOL/L (ref 135–145)
TSH SERPL DL<=0.005 MIU/L-ACNC: 1.1 UIU/ML (ref 0.3–4.2)

## 2024-05-03 LAB
ATRIAL RATE - MUSE: 53 BPM
DIASTOLIC BLOOD PRESSURE - MUSE: NORMAL MMHG
INTERPRETATION ECG - MUSE: NORMAL
P AXIS - MUSE: -19 DEGREES
PR INTERVAL - MUSE: 194 MS
QRS DURATION - MUSE: 152 MS
QT - MUSE: 448 MS
QTC - MUSE: 420 MS
R AXIS - MUSE: -25 DEGREES
SYSTOLIC BLOOD PRESSURE - MUSE: NORMAL MMHG
T AXIS - MUSE: -10 DEGREES
VENTRICULAR RATE- MUSE: 53 BPM

## 2024-05-22 ENCOUNTER — ANCILLARY PROCEDURE (OUTPATIENT)
Dept: CARDIOLOGY | Facility: CLINIC | Age: 84
End: 2024-05-22
Attending: INTERNAL MEDICINE
Payer: COMMERCIAL

## 2024-05-22 DIAGNOSIS — R01.1 HEART MURMUR: ICD-10-CM

## 2024-05-22 LAB — LVEF ECHO: NORMAL

## 2024-05-22 PROCEDURE — 93325 DOPPLER ECHO COLOR FLOW MAPG: CPT | Performed by: INTERNAL MEDICINE

## 2024-05-22 PROCEDURE — 93321 DOPPLER ECHO F-UP/LMTD STD: CPT | Performed by: INTERNAL MEDICINE

## 2024-05-22 PROCEDURE — 93308 TTE F-UP OR LMTD: CPT | Performed by: INTERNAL MEDICINE

## 2024-06-04 ENCOUNTER — OFFICE VISIT (OUTPATIENT)
Dept: FAMILY MEDICINE | Facility: CLINIC | Age: 84
End: 2024-06-04
Payer: COMMERCIAL

## 2024-06-04 VITALS
TEMPERATURE: 97 F | BODY MASS INDEX: 30.16 KG/M2 | RESPIRATION RATE: 16 BRPM | DIASTOLIC BLOOD PRESSURE: 74 MMHG | SYSTOLIC BLOOD PRESSURE: 132 MMHG | WEIGHT: 181 LBS | HEIGHT: 65 IN | HEART RATE: 74 BPM | OXYGEN SATURATION: 98 %

## 2024-06-04 DIAGNOSIS — I10 HYPERTENSION GOAL BP (BLOOD PRESSURE) < 140/90: ICD-10-CM

## 2024-06-04 DIAGNOSIS — E78.00 HIGH CHOLESTEROL: ICD-10-CM

## 2024-06-04 PROCEDURE — 99214 OFFICE O/P EST MOD 30 MIN: CPT | Performed by: PHYSICIAN ASSISTANT

## 2024-06-04 PROCEDURE — G2211 COMPLEX E/M VISIT ADD ON: HCPCS | Performed by: PHYSICIAN ASSISTANT

## 2024-06-04 RX ORDER — SIMVASTATIN 40 MG
40 TABLET ORAL AT BEDTIME
Qty: 90 TABLET | Refills: 1 | Status: SHIPPED | OUTPATIENT
Start: 2024-06-04

## 2024-06-04 RX ORDER — LISINOPRIL 20 MG/1
20 TABLET ORAL DAILY
Qty: 90 TABLET | Refills: 1 | Status: SHIPPED | OUTPATIENT
Start: 2024-06-04

## 2024-06-04 RX ORDER — AMLODIPINE BESYLATE 2.5 MG/1
2.5 TABLET ORAL DAILY
Qty: 90 TABLET | Refills: 1 | Status: SHIPPED | OUTPATIENT
Start: 2024-06-04

## 2024-06-04 ASSESSMENT — PAIN SCALES - GENERAL: PAINLEVEL: NO PAIN (0)

## 2024-06-04 NOTE — PROGRESS NOTES
"  Assessment & Plan       ICD-10-CM    1. Hypertension goal BP (blood pressure) < 140/90  I10 amLODIPine (NORVASC) 2.5 MG tablet     lisinopril (ZESTRIL) 20 MG tablet      2. High cholesterol  E78.00 simvastatin (ZOCOR) 40 MG tablet      medical conditions are stable. meds refilled.  Recheck 6 months.   The longitudinal plan of care for the diagnosis(es)/condition(s) as documented were addressed during this visit. Due to the added complexity in care, I will continue to support Markel in the subsequent management and with ongoing continuity of care.    Subjective   Markel is a 83 year old, presenting for the following health issues:  RECHECK    HPI     Follow up cardiology - notes reviewed. No cardiac concerns.     Hyperlipidemia Follow-Up    Are you regularly taking any medication or supplement to lower your cholesterol?   Yes- simvaststin  Are you having muscle aches or other side effects that you think could be caused by your cholesterol lowering medication?  No    Hypertension Follow-up    Do you check your blood pressure regularly outside of the clinic? Yes   Are you following a low salt diet? Yes  Are your blood pressures ever more than 140 on the top number (systolic) OR more   than 90 on the bottom number (diastolic), for example 140/90? No  No chest pain or short of breath. No headache or dizziness.      Review of Systems  Constitutional, HEENT, cardiovascular, pulmonary, gi and gu systems are negative, except as otherwise noted.      Objective    /74   Pulse 74   Temp 97  F (36.1  C) (Oral)   Resp 16   Ht 1.638 m (5' 4.5\")   Wt 82.1 kg (181 lb)   SpO2 98%   BMI 30.59 kg/m    Body mass index is 30.59 kg/m .  Physical Exam   GENERAL: alert and no distress  RESP: lungs clear to auscultation - no rales, rhonchi or wheezes  CV: regular rate and rhythm, normal S1 S2, no S3 or S4, no murmur, click or rub, no peripheral edema  MS: no gross musculoskeletal defects noted, no edema    Labs reviewed.     "     Signed Electronically by: Jonathan Oliver PA-C

## 2024-06-04 NOTE — PATIENT INSTRUCTIONS
calcium  intake to 1200 mg daily with   800- 1200 iu of Vitamin D.  Vitamin D is important in preventing bone loss and preventing certain cancers.

## 2024-06-13 ENCOUNTER — OFFICE VISIT (OUTPATIENT)
Dept: FAMILY MEDICINE | Facility: CLINIC | Age: 84
End: 2024-06-13
Payer: COMMERCIAL

## 2024-06-13 VITALS
TEMPERATURE: 97.6 F | WEIGHT: 183 LBS | OXYGEN SATURATION: 97 % | DIASTOLIC BLOOD PRESSURE: 60 MMHG | HEART RATE: 78 BPM | SYSTOLIC BLOOD PRESSURE: 124 MMHG | RESPIRATION RATE: 16 BRPM | BODY MASS INDEX: 30.93 KG/M2

## 2024-06-13 DIAGNOSIS — J02.9 SORE THROAT: ICD-10-CM

## 2024-06-13 DIAGNOSIS — J06.9 UPPER RESPIRATORY TRACT INFECTION, UNSPECIFIED TYPE: Primary | ICD-10-CM

## 2024-06-13 DIAGNOSIS — U07.1 INFECTION DUE TO 2019 NOVEL CORONAVIRUS: ICD-10-CM

## 2024-06-13 DIAGNOSIS — J00 ACUTE RHINITIS: ICD-10-CM

## 2024-06-13 LAB
DEPRECATED S PYO AG THROAT QL EIA: NEGATIVE
GROUP A STREP BY PCR: NOT DETECTED
SARS-COV-2 RNA RESP QL NAA+PROBE: POSITIVE

## 2024-06-13 PROCEDURE — 99213 OFFICE O/P EST LOW 20 MIN: CPT | Performed by: PHYSICIAN ASSISTANT

## 2024-06-13 PROCEDURE — 87635 SARS-COV-2 COVID-19 AMP PRB: CPT | Performed by: PHYSICIAN ASSISTANT

## 2024-06-13 PROCEDURE — 87651 STREP A DNA AMP PROBE: CPT | Performed by: PHYSICIAN ASSISTANT

## 2024-06-13 ASSESSMENT — PAIN SCALES - GENERAL: PAINLEVEL: MODERATE PAIN (4)

## 2024-06-13 NOTE — PROGRESS NOTES
Assessment & Plan       ICD-10-CM    1. Upper respiratory tract infection, unspecified type  J06.9 Symptomatic COVID-19 Virus (Coronavirus) by PCR Nose      2. Sore throat  J02.9 Streptococcus A Rapid Screen w/Reflex to PCR - Clinic Collect     Symptomatic COVID-19 Virus (Coronavirus) by PCR Nose      3. Acute rhinitis  J00 Symptomatic COVID-19 Virus (Coronavirus) by PCR Nose      Labs pending. Follow up  dependant on labs.   Warning signs discussed. Side effects discussed. Symptomatic treatment such as pushing fluids. Ok for over the counter acetaminophen and /or non-steroidal anti-inflammatory medication as needed.        Maddison Jean Baptiste is a 83 year old, presenting for the following health issues:  Throat Problem    HPI     Throat pain, rhinitis, fatigue for 2 days.    Throat is very sore in a.m.  No fevers, chills.   No nausea, vomiting, diarrhea  Tx: none  No chest pains or short of breath. Minimal cough.    Review of Systems  Constitutional, HEENT, cardiovascular, pulmonary, gi and gu systems are negative, except as otherwise noted.      Objective    /60   Pulse 78   Temp 97.6  F (36.4  C) (Tympanic)   Resp 16   Wt 83 kg (183 lb)   SpO2 97%   BMI 30.93 kg/m    Body mass index is 30.93 kg/m .  Physical Exam   GENERAL: healthy, alert and no distress  Head: Normocephalic, atraumatic.  Eyes: Conjunctiva clear, non icteric. PERRLA.  Ears: External ears and TMs normal BL.  Nose: Septum midline, nasal mucosa pink and moist. clear discharge.  Mouth / Throat: Normal dentition.  No oral lesions. Pharynx non erythematous, tonsils without hypertrophy.  Neck: Supple, no enlarged LN, trachea midline.  Heart: S1 and S2 normal, no murmurs, clicks, gallops or rubs. Regular rate and rhythm.  Chest: Clear; no wheezes or rales.      Labs pending      Results for orders placed or performed in visit on 06/13/24   Streptococcus A Rapid Screen w/Reflex to PCR - Clinic Collect     Status: Normal    Specimen: Throat;  Swab   Result Value Ref Range    Group A Strep antigen Negative Negative         Signed Electronically by: Jonathan Oliver PA-C

## 2024-06-14 ENCOUNTER — TELEPHONE (OUTPATIENT)
Dept: FAMILY MEDICINE | Facility: CLINIC | Age: 84
End: 2024-06-14

## 2024-06-14 DIAGNOSIS — U07.1 INFECTION DUE TO 2019 NOVEL CORONAVIRUS: ICD-10-CM

## 2024-06-14 NOTE — TELEPHONE ENCOUNTER
Medication Question or Refill        What medication are you calling about (include dose and sig)?: nirmatrelvir and ritonavir (PAXLOVID) 300 mg/100 mg therapy pack     Preferred Pharmacy: Pt would like medication sent to the [facility]. [facility] currently is out of stock for this medication and would like it sent over to [facility] in [place].     Controlled Substance Agreement on file:   CSA -- Patient Level:    CSA: None found at the patient level.       Who prescribed the medication?: Jonathan Oliver     Do you need a refill? No    When did you use the medication last? N/a     Patient offered an appointment? Yes:     Do you have any questions or concerns?  No      Okay to leave a detailed message?: Yes at Cell number on file:    No relevant phone numbers on file.

## 2024-06-19 ENCOUNTER — PATIENT OUTREACH (OUTPATIENT)
Dept: CARE COORDINATION | Facility: CLINIC | Age: 84
End: 2024-06-19
Payer: COMMERCIAL

## 2024-07-03 ENCOUNTER — PATIENT OUTREACH (OUTPATIENT)
Dept: CARE COORDINATION | Facility: CLINIC | Age: 84
End: 2024-07-03
Payer: COMMERCIAL

## 2024-08-06 ENCOUNTER — TELEPHONE (OUTPATIENT)
Dept: FAMILY MEDICINE | Facility: CLINIC | Age: 84
End: 2024-08-06
Payer: COMMERCIAL

## 2024-08-06 NOTE — LETTER
August 6, 2024    To  Markel Padilla  [address] [place] [address]    Your team at [facility] cares about your health. We have reviewed your chart and based on our findings; we are making the following recommendations to better manage your health.     You are in particular need of attention regarding the following:     PREVENTATIVE VISIT: Annual Medicare Wellness:Schedule an Annual Medicare Wellness Exam. Please call your [facility] clinic to set up your appointment.    If you have already completed these items, please contact the clinic via phone or   MyChart so your care team can review and update your records. Thank you for   choosing [facility]ics for your healthcare needs. For any questions,   concerns, or to schedule an appointment please contact our clinic.    Healthy Regards,      Your [facility] Care Team

## 2024-08-06 NOTE — TELEPHONE ENCOUNTER
Patient Quality Outreach    Patient is due for the following:   Physical Annual Wellness Visit    Next Steps:   Schedule a Annual Wellness Visit    Type of outreach:    Sent letter.    Next Steps:  Reach out within 90 days via Letter.    Max number of attempts reached: No. Will try again in 90 days if patient still on fail list.    Questions for provider review:    None           Rut Max MA  Chart routed to Care Team.

## 2024-09-17 ENCOUNTER — TELEPHONE (OUTPATIENT)
Dept: FAMILY MEDICINE | Facility: CLINIC | Age: 84
End: 2024-09-17
Payer: COMMERCIAL

## 2024-09-17 DIAGNOSIS — R21 RASH: ICD-10-CM

## 2024-09-18 DIAGNOSIS — K21.9 GASTROESOPHAGEAL REFLUX DISEASE, UNSPECIFIED WHETHER ESOPHAGITIS PRESENT: ICD-10-CM

## 2024-09-18 RX ORDER — CLOBETASOL PROPIONATE 0.5 MG/G
CREAM TOPICAL 2 TIMES DAILY
Qty: 30 G | Refills: 1 | Status: SHIPPED | OUTPATIENT
Start: 2024-09-18

## 2024-12-01 NOTE — TELEPHONE ENCOUNTER
Ok for refill  Due for recheck blood pressure in clinic with me.     Jonathan Oliver PA-C     Pt asleep

## 2025-01-03 DIAGNOSIS — I10 HYPERTENSION GOAL BP (BLOOD PRESSURE) < 140/90: ICD-10-CM

## 2025-01-07 RX ORDER — AMLODIPINE BESYLATE 2.5 MG/1
2.5 TABLET ORAL DAILY
Qty: 90 TABLET | Refills: 0 | Status: SHIPPED | OUTPATIENT
Start: 2025-01-07

## 2025-01-09 ENCOUNTER — OFFICE VISIT (OUTPATIENT)
Dept: URGENT CARE | Facility: URGENT CARE | Age: 85
End: 2025-01-09
Payer: COMMERCIAL

## 2025-01-09 VITALS
OXYGEN SATURATION: 96 % | SYSTOLIC BLOOD PRESSURE: 199 MMHG | WEIGHT: 185.6 LBS | TEMPERATURE: 97.4 F | HEART RATE: 82 BPM | DIASTOLIC BLOOD PRESSURE: 82 MMHG | RESPIRATION RATE: 18 BRPM | BODY MASS INDEX: 31.37 KG/M2

## 2025-01-09 DIAGNOSIS — L03.317 CELLULITIS OF BUTTOCK: Primary | ICD-10-CM

## 2025-01-09 RX ORDER — CEPHALEXIN 500 MG/1
500 CAPSULE ORAL 3 TIMES DAILY
Qty: 30 CAPSULE | Refills: 0 | Status: SHIPPED | OUTPATIENT
Start: 2025-01-09 | End: 2025-01-19

## 2025-01-09 NOTE — PROGRESS NOTES
Assessment & Plan     There are no diagnoses linked to this encounter.   {2021 E&M time (Optional):582704}    {Provider  Link to [facility] Help Grid :952623}    No follow-ups on file.    Chelle Mendoza, JESUS CNP  [facility]    Maddison Jean Baptiste is a 84 year old male who presents to clinic today for the following health issues:  Chief Complaint   Patient presents with    Cyst     Cyst on tailbone, pain, some fluid drainage. Sx 1 week.        HPI      Review of Systems  Constitutional, HEENT, cardiovascular, pulmonary, GI, , musculoskeletal, neuro, skin, endocrine and psych systems are negative, except as otherwise noted.      Objective    /82 (BP Location: Left arm, Cuff Size: Adult Regular)   Pulse 82   Temp 97.4  F (36.3  C) (Tympanic)   Resp 18   Wt 84.2 kg (185 lb 9.6 oz)   SpO2 96%   BMI 31.37 kg/m    Physical Exam   {Exam List (Optional):272316}    {Diagnostic Test Results (Optional):603780}

## 2025-01-15 ENCOUNTER — PATIENT OUTREACH (OUTPATIENT)
Dept: CARE COORDINATION | Facility: CLINIC | Age: 85
End: 2025-01-15
Payer: COMMERCIAL

## 2025-01-22 ENCOUNTER — OFFICE VISIT (OUTPATIENT)
Dept: FAMILY MEDICINE | Facility: CLINIC | Age: 85
End: 2025-01-22
Payer: COMMERCIAL

## 2025-01-22 VITALS
SYSTOLIC BLOOD PRESSURE: 120 MMHG | WEIGHT: 184 LBS | HEIGHT: 66 IN | RESPIRATION RATE: 16 BRPM | BODY MASS INDEX: 29.57 KG/M2 | OXYGEN SATURATION: 97 % | TEMPERATURE: 97 F | DIASTOLIC BLOOD PRESSURE: 70 MMHG | HEART RATE: 69 BPM

## 2025-01-22 DIAGNOSIS — M65.30 TRIGGER FINGER, ACQUIRED: ICD-10-CM

## 2025-01-22 DIAGNOSIS — E78.00 HIGH CHOLESTEROL: ICD-10-CM

## 2025-01-22 DIAGNOSIS — K21.9 GASTROESOPHAGEAL REFLUX DISEASE, UNSPECIFIED WHETHER ESOPHAGITIS PRESENT: ICD-10-CM

## 2025-01-22 DIAGNOSIS — I10 HYPERTENSION GOAL BP (BLOOD PRESSURE) < 140/90: Primary | ICD-10-CM

## 2025-01-22 DIAGNOSIS — Z87.2 H/O PILONIDAL CYST: ICD-10-CM

## 2025-01-22 PROCEDURE — G2211 COMPLEX E/M VISIT ADD ON: HCPCS | Performed by: PHYSICIAN ASSISTANT

## 2025-01-22 PROCEDURE — 99214 OFFICE O/P EST MOD 30 MIN: CPT | Performed by: PHYSICIAN ASSISTANT

## 2025-01-22 RX ORDER — SIMVASTATIN 40 MG
40 TABLET ORAL AT BEDTIME
Qty: 90 TABLET | Refills: 1 | Status: SHIPPED | OUTPATIENT
Start: 2025-01-22

## 2025-01-22 RX ORDER — AMLODIPINE BESYLATE 2.5 MG/1
2.5 TABLET ORAL DAILY
Qty: 90 TABLET | Refills: 0 | Status: SHIPPED | OUTPATIENT
Start: 2025-01-22

## 2025-01-22 RX ORDER — LISINOPRIL 20 MG/1
20 TABLET ORAL DAILY
Qty: 90 TABLET | Refills: 1 | Status: SHIPPED | OUTPATIENT
Start: 2025-01-22

## 2025-01-22 ASSESSMENT — PAIN SCALES - GENERAL: PAINLEVEL_OUTOF10: NO PAIN (0)

## 2025-01-22 NOTE — PROGRESS NOTES
Assessment & Plan       ICD-10-CM    1. Hypertension goal BP (blood pressure) < 140/90  I10 BASIC METABOLIC PANEL     amLODIPine (NORVASC) 2.5 MG tablet     lisinopril (ZESTRIL) 20 MG tablet      2. High cholesterol  E78.00 Lipid panel reflex to direct LDL Non-fasting     simvastatin (ZOCOR) 40 MG tablet      3. Gastroesophageal reflux disease, unspecified whether esophagitis present  K21.9 omeprazole (PRILOSEC) 20 MG DR capsule      4. Trigger finger, acquired  M65.30 Orthopedic  Referral      5. H/O pilonidal cyst  Z87.2         1-3: medical conditions are stable. meds refilled.  4. Follow up  with ortho  5.  Continue to monitor this conservatively.  If this becomes a recurrent issue for him, follow-up with general surgery for second opinion.  Recheck 6 months     The longitudinal plan of care for the diagnosis(es)/condition(s) as documented were addressed during this visit. Due to the added complexity in care, I will continue to support Markel in the subsequent management and with ongoing continuity of care.  Subjective   Markel is a 84 year old, presenting for the following health issues:  Recheck Medication    History of Present Illness       Reason for visit:  Med check   He is taking medications regularly.  Patient is here for follow-up of hypertension high cholesterol and GERD.  Medical conditions are stable and he has been taking his medications regularly.  He has home blood pressure monitor that he brings in today to check with ours and it is reading accurately.  His home blood pressure readings are in the 120s over 80s.  He denies any chest pain or shortness of breath.  He feels like his heartburn is controlled with omeprazole.  Still has history of Right long finger locks up. Tx: finger splint.   Pain when it locks up. Hard to straighten up.     Also was recently seen in urgent care for pilonidal cyst.  He finished up his antibiotics and he feels like overall it is improving.  He denies any  "pain fevers or discharge.  He has had this drained in his 20s and has had struggles with it off and on every couple years since it.                                                                                                                                                                                                    Review of Systems  Constitutional, HEENT, cardiovascular, pulmonary, gi and gu systems are negative, except as otherwise noted.      Objective    /70   Pulse 69   Temp 97  F (36.1  C) (Oral)   Resp 16   Ht 1.676 m (5' 6\")   Wt 83.5 kg (184 lb)   SpO2 97%   BMI 29.70 kg/m    Body mass index is 29.7 kg/m .  Physical Exam   GENERAL: alert and no distress  NECK: no adenopathy, no asymmetry, masses, or scars  RESP: lungs clear to auscultation - no rales, rhonchi or wheezes  CV: regular rate and rhythm, normal S1 S2, no S3 or S4, no murmur, click or rub, no peripheral edema  ABDOMEN: soft, nontender, no hepatosplenomegaly, no masses and bowel sounds normal  MS: no gross musculoskeletal defects noted, no edema  Right hand: long finger pain with range of motion.  He has pain past 90 degree flexion of the IP joint of his right long finger.  No other testing was done due to pain.  He is neuro vas intact distally.  Examination of his buttock region he has a healing pilonidal cyst slightly erythematous no tenderness no drainage.    Labs pending         Signed Electronically by: Jonathan Oliver PA-C    "

## 2025-01-28 NOTE — PROGRESS NOTES
SUBJECTIVE:   Markel Padilla is a 84 year old male who is seen in consultation at the request of  Jonathan Oliver PA-C for evaluation of difficulties with the right  3rd finger that has been present approximately a couple of months.   No known injury.   Pain is located in the finger and the palm area over the corresponding A1 pulley(s), with flexion  Locking no   Just can't fully extend the finger, painful to do so.  Treatments tried to this point: none    Previous hx of similar problems: has had this issue every 2-3 years, and doesn't involve locking/triggering.  He has had corticosteroid injections in the past for this with good success..    Past Medical History:   Past Medical History:   Diagnosis Date    Ehrlichiosis     Hemorrhage gastric     Hypercholesteremia     Hypertension     MEDICAL HISTORY OF -     major forearm laceration     Past Surgical History:   Past Surgical History:   Procedure Laterality Date    NO HISTORY OF SURGERY       Family History:   Family History   Problem Relation Age of Onset    Heart Disease Mother     Lipids Mother     C.A.D. Father         age 65    Respiratory Sister         COPD    Hypertension Son     Hypertension Son      Social History:   Social History     Tobacco Use    Smoking status: Former     Current packs/day: 0.00     Types: Cigarettes     Quit date: 1979     Years since quittin.2    Smokeless tobacco: Current     Types: Chew     Last attempt to quit: 1989   Substance Use Topics    Alcohol use: Yes     Comment: a glass of wine everyday at lunch       Review of Systems:  Constitutional:  NEGATIVE for fever, chills, change in weight  Integumentary/Skin:  NEGATIVE for worrisome rashes, moles or lesions  Eyes:  NEGATIVE for vision changes or irritation  ENT/Mouth:  NEGATIVE for ear, mouth and throat problems  Resp:  NEGATIVE for significant cough or SOB  Breast:  NEGATIVE for masses, tenderness or discharge  CV:  NEGATIVE for chest pain,  palpitations or peripheral edema  GI:  NEGATIVE for nausea, abdominal pain, heartburn, or change in bowel habits  :  Negative   Musculoskeletal:  See HPI above  Neuro:  NEGATIVE for weakness, dizziness or paresthesias  Endocrine:  NEGATIVE for temperature intolerance, skin/hair changes  Heme/allergy/immune:  NEGATIVE for bleeding problems  Psychiatric:  NEGATIVE for changes in mood or affect      OBJECTIVE:  Physical Exam:  BP (!) 156/66 (BP Location: Left arm, Patient Position: Sitting, Cuff Size: Adult Regular)   Pulse 68   Wt 83.5 kg (184 lb)   SpO2 98%   BMI 29.70 kg/m    General Appearance: healthy, alert and no distress   Skin: no suspicious lesions or rashes  Neuro: Normal strength and tone, mentation intact and speech normal  Vascular: good pulses, and cappillary refill   Lymph: no lymphadenopathy   Psych:  mentation appears normal and affect normal/bright  Resp: no increased work of breathing     Right Hand Exam:  Has tenderness over the right  3rd finger A1 pulley(s),   no locking/triggering, but has significant pain with attempts at full extension.  No dupuytren's bands noted.     ASSESSMENT:   Right 3rd trigger finger    PLAN:   We talked about the options: taping/splinting the PIP joint periodically, corticosteroid injection, and trigger finger release. I have explained the nature of the surgical procedure, the risks and recovery time with the patient.   he has decided to proceed with corticosteroid injection     Procedure Note:   Informed consent obtained. Risks, benefits and complications of the injection were discussed with the patient and the patient elected to proceed. A Right 3rd trigger finger/flexor tenosynovial, A1 pulley- area steroid injection was performed using 0.5 cc  Kenalog-40 40mg per mL after sterile prep. This was tolerated well by the patient.       Return to clinic: as needed     KEVON Mejia MD  Dept. Orthopedic Surgery  [facility]

## 2025-01-30 ENCOUNTER — OFFICE VISIT (OUTPATIENT)
Dept: ORTHOPEDICS | Facility: CLINIC | Age: 85
End: 2025-01-30
Attending: PHYSICIAN ASSISTANT
Payer: COMMERCIAL

## 2025-01-30 VITALS
WEIGHT: 184 LBS | OXYGEN SATURATION: 98 % | SYSTOLIC BLOOD PRESSURE: 156 MMHG | BODY MASS INDEX: 29.7 KG/M2 | DIASTOLIC BLOOD PRESSURE: 66 MMHG | HEART RATE: 68 BPM

## 2025-01-30 DIAGNOSIS — M65.30 TRIGGER FINGER, ACQUIRED: Primary | ICD-10-CM

## 2025-01-30 RX ORDER — TRIAMCINOLONE ACETONIDE 40 MG/ML
20 INJECTION, SUSPENSION INTRA-ARTICULAR; INTRAMUSCULAR
Status: COMPLETED | OUTPATIENT
Start: 2025-01-30 | End: 2025-01-30

## 2025-01-30 RX ADMIN — TRIAMCINOLONE ACETONIDE 20 MG: 40 INJECTION, SUSPENSION INTRA-ARTICULAR; INTRAMUSCULAR at 10:34

## 2025-01-30 ASSESSMENT — PAIN SCALES - GENERAL: PAINLEVEL_OUTOF10: MODERATE PAIN (5)

## 2025-01-30 NOTE — PROGRESS NOTES
Hand / Upper Extremity Injection/Arthrocentesis: R long A1    Date/Time: 1/30/2025 10:34 AM    Performed by: Zack Mejia MD  Authorized by: Zack Mejia MD    Indications:  Pain  Needle Size:  25 G  Approach:  Volar  Condition: trigger finger    Location:  Long finger    Site:  R long A1  Medications:  20 mg triamcinolone 40 MG/ML  Outcome:  Tolerated well, no immediate complications  Procedure discussed: discussed risks, benefits, and alternatives    Timeout: timeout called immediately prior to procedure    Prep: patient was prepped and draped in usual sterile fashion

## 2025-01-30 NOTE — LETTER
2025      Markel Padilla  [address] [place] [address]      Dear Colleague,    Thank you for referring your patient, Markel Padilla, to the [facility]. Please see a copy of my visit note below.    SUBJECTIVE:   Markel Padilla is a 84 year old male who is seen in consultation at the request of  Jonathan Oliver PA-C for evaluation of difficulties with the right  3rd finger that has been present approximately a couple of months.   No known injury.   Pain is located in the finger and the palm area over the corresponding A1 pulley(s), with flexion  Locking no   Just can't fully extend the finger, painful to do so.  Treatments tried to this point: none    Previous hx of similar problems: has had this issue every 2-3 years, and doesn't involve locking/triggering.  He has had corticosteroid injections in the past for this with good success..    Past Medical History:   Past Medical History:   Diagnosis Date     Ehrlichiosis      Hemorrhage gastric      Hypercholesteremia      Hypertension      MEDICAL HISTORY OF -     major forearm laceration     Past Surgical History:   Past Surgical History:   Procedure Laterality Date     NO HISTORY OF SURGERY       Family History:   Family History   Problem Relation Age of Onset     Heart Disease Mother      Lipids Mother      C.A.D. Father         age 65     Respiratory Sister         COPD     Hypertension Son      Hypertension Son      Social History:   Social History     Tobacco Use     Smoking status: Former     Current packs/day: 0.00     Types: Cigarettes     Quit date: 1979     Years since quittin.2     Smokeless tobacco: Current     Types: Chew     Last attempt to quit: 1989   Substance Use Topics     Alcohol use: Yes     Comment: a glass of wine everyday at lunch       Review of Systems:  Constitutional:  NEGATIVE for fever, chills, change in weight  Integumentary/Skin:  NEGATIVE for worrisome rashes, moles or  lesions  Eyes:  NEGATIVE for vision changes or irritation  ENT/Mouth:  NEGATIVE for ear, mouth and throat problems  Resp:  NEGATIVE for significant cough or SOB  Breast:  NEGATIVE for masses, tenderness or discharge  CV:  NEGATIVE for chest pain, palpitations or peripheral edema  GI:  NEGATIVE for nausea, abdominal pain, heartburn, or change in bowel habits  :  Negative   Musculoskeletal:  See HPI above  Neuro:  NEGATIVE for weakness, dizziness or paresthesias  Endocrine:  NEGATIVE for temperature intolerance, skin/hair changes  Heme/allergy/immune:  NEGATIVE for bleeding problems  Psychiatric:  NEGATIVE for changes in mood or affect      OBJECTIVE:  Physical Exam:  BP (!) 156/66 (BP Location: Left arm, Patient Position: Sitting, Cuff Size: Adult Regular)   Pulse 68   Wt 83.5 kg (184 lb)   SpO2 98%   BMI 29.70 kg/m    General Appearance: healthy, alert and no distress   Skin: no suspicious lesions or rashes  Neuro: Normal strength and tone, mentation intact and speech normal  Vascular: good pulses, and cappillary refill   Lymph: no lymphadenopathy   Psych:  mentation appears normal and affect normal/bright  Resp: no increased work of breathing     Right Hand Exam:  Has tenderness over the right  3rd finger A1 pulley(s),   no locking/triggering, but has significant pain with attempts at full extension.  No dupuytren's bands noted.     ASSESSMENT:   Right 3rd trigger finger    PLAN:   We talked about the options: taping/splinting the PIP joint periodically, corticosteroid injection, and trigger finger release. I have explained the nature of the surgical procedure, the risks and recovery time with the patient.   he has decided to proceed with corticosteroid injection     Procedure Note:   Informed consent obtained. Risks, benefits and complications of the injection were discussed with the patient and the patient elected to proceed. A Right 3rd trigger finger/flexor tenosynovial, A1 pulley- area steroid injection  was performed using 0.5 cc  Kenalog-40 40mg per mL after sterile prep. This was tolerated well by the patient.       Return to clinic: as needed     KEVON Mejia MD  Dept. Orthopedic Surgery  [facility]      Hand / Upper Extremity Injection/Arthrocentesis: R long A1    Date/Time: 1/30/2025 10:34 AM    Performed by: Zack Mejia MD  Authorized by: Zack Mejia MD    Indications:  Pain  Needle Size:  25 G  Approach:  Volar  Condition: trigger finger    Location:  Long finger    Site:  R long A1  Medications:  20 mg triamcinolone 40 MG/ML  Outcome:  Tolerated well, no immediate complications  Procedure discussed: discussed risks, benefits, and alternatives    Timeout: timeout called immediately prior to procedure    Prep: patient was prepped and draped in usual sterile fashion          Again, thank you for allowing me to participate in the care of your patient.        Sincerely,        Zack Mejia MD    Electronically signed

## 2025-03-04 ENCOUNTER — TELEPHONE (OUTPATIENT)
Dept: FAMILY MEDICINE | Facility: CLINIC | Age: 85
End: 2025-03-04
Payer: COMMERCIAL

## 2025-03-04 NOTE — TELEPHONE ENCOUNTER
Patient Quality Outreach    Patient is due for the following:   Physical Annual Wellness Visit    Action(s) Taken:   Schedule a Annual Wellness Visit    Type of outreach:    Sent letter.    Questions for provider review:    None           Rut Max MA  Chart routed to Care Team.

## 2025-03-04 NOTE — LETTER
March 4, 2025    To  Markel Padilla  [address] [place] [address]    Your team at [facility] cares about your health. We have reviewed your chart and based on our findings; we are making the following recommendations to better manage your health.     You are in particular need of attention regarding the following:     PREVENTATIVE VISIT: Annual Medicare Wellness:Schedule an Annual Medicare Wellness Exam. Please call your [facility] clinic to set up your appointment.    If you have already completed these items, please contact the clinic via phone or   MyChart so your care team can review and update your records. Thank you for   choosing [facility]ics for your healthcare needs. For any questions,   concerns, or to schedule an appointment please contact our clinic.    Healthy Regards,      Your [facility] Care Team            Electronically signed

## 2025-03-18 ENCOUNTER — OFFICE VISIT (OUTPATIENT)
Dept: FAMILY MEDICINE | Facility: CLINIC | Age: 85
End: 2025-03-18
Payer: COMMERCIAL

## 2025-03-18 VITALS
TEMPERATURE: 96 F | SYSTOLIC BLOOD PRESSURE: 98 MMHG | BODY MASS INDEX: 29.86 KG/M2 | RESPIRATION RATE: 16 BRPM | DIASTOLIC BLOOD PRESSURE: 64 MMHG | OXYGEN SATURATION: 97 % | WEIGHT: 185 LBS | HEART RATE: 64 BPM

## 2025-03-18 DIAGNOSIS — L98.9 SKIN SORE: Primary | ICD-10-CM

## 2025-03-18 PROCEDURE — 3078F DIAST BP <80 MM HG: CPT | Performed by: PHYSICIAN ASSISTANT

## 2025-03-18 PROCEDURE — 3074F SYST BP LT 130 MM HG: CPT | Performed by: PHYSICIAN ASSISTANT

## 2025-03-18 PROCEDURE — 1126F AMNT PAIN NOTED NONE PRSNT: CPT | Performed by: PHYSICIAN ASSISTANT

## 2025-03-18 PROCEDURE — 99213 OFFICE O/P EST LOW 20 MIN: CPT | Performed by: PHYSICIAN ASSISTANT

## 2025-03-18 ASSESSMENT — PAIN SCALES - GENERAL: PAINLEVEL_OUTOF10: NO PAIN (0)

## 2025-03-18 NOTE — PROGRESS NOTES
Assessment & Plan       ICD-10-CM    1. Skin sore  L98.9 Adult Dermatology  Referral      Talk to patient about his concerns.  Unclear etiology at this time.  This could be a healing wound from hitting his head versus possible actinic keratosis.  I did refer him to dermatology for second opinion.  I did encourage him to continue use Neosporin.  Warning signs were discussed.    Subjective   Markel is a 84 year old, presenting for the following health issues:  Head Injury        3/18/2025     8:30 AM   Additional Questions   Roomed by sanjiv   Accompanied by self         3/18/2025     8:30 AM   Patient Reported Additional Medications   Patient reports taking the following new medications YES [employer] over the counter     HPI      Bump head on deck couple months ago.  He states since then he has had a sore on the top of his head.  He denies any headaches or dizziness or loss of consciousness.  He states he otherwise feels well up.  Mostly here to check up on his skin.  He has been using Neosporin as needed on it.  He just states that is not getting any better.  Sore on top of head that won't heal.        Review of Systems  Constitutional, HEENT, cardiovascular, pulmonary, gi and gu systems are negative, except as otherwise noted.      Objective    BP 98/64   Pulse 64   Temp (!) 96  F (35.6  C) (Oral)   Resp 16   Wt 83.9 kg (185 lb)   SpO2 97%   BMI 29.86 kg/m    Body mass index is 29.86 kg/m .  Physical Exam   GENERAL: alert and no distress  RESP: lungs clear to auscultation - no rales, rhonchi or wheezes  CV: regular rate and rhythm, normal S1 S2, no S3 or S4, no murmur, click or rub, no peripheral edema  SKIN: 1cm raised dry skin sore on top of head.           Signed Electronically by: Jonathan Oliver PA-C

## 2025-04-11 NOTE — TELEPHONE ENCOUNTER
Results for orders placed or performed during the hospital encounter of 03/21/22   MRI Lumbar spine w/o contrast    Narrative    MRI LUMBAR SPINE WITHOUT CONTRAST March 21, 2022 2:58 PM     HISTORY: Concern for lumbar radiculopathy. Right posterior hip pain  with radiation. Posterior pain of hip, right. Lumbar radiculopathy.     TECHNIQUE: Multiplanar multisequence MRI of the lumbar spine without  contrast.     COMPARISON: None available. Correlation made with CT abdomen and  pelvis 1/11/2022.    FINDINGS: Nomenclature is based on five lumbar vertebral bodies.  Normal vertebral body heights. Retrolisthesis of L2 on L3 measuring 4  mm. Retrolisthesis of L3 on L4 measuring 3 mm. Anterolisthesis of L4  on L5 measuring 4-5 mm. Anterolisthesis of L5 on S1 measuring 3 mm.  Exaggerated lumbar lordosis. Mild dextroconvex curvature centered in  the upper lumbar spine. Somewhat diffusely heterogeneous bone marrow  signal, but overall appearing benign. Mild Modic type I degenerative  endplate signal changes anteriorly at T12-L1, along the left posterior  aspect of the L3-L4 disc space, and along the right posterior aspect  of the L4-L5 disc space. Diffuse intervertebral disc desiccation.  Normal appearance of the distal spinal cord with the conus terminating  at L1. The paraspinal soft tissues and visualized aspects of the bony  pelvis are unremarkable.    Segmental analysis:  T12-L1: Minimal disc height loss. Shallow symmetric disc bulge. Normal  facets. No spinal canal stenosis. No significant neural foraminal  stenosis.    L1-L2: Mild disc height loss. No herniation. Normal facets. No spinal  canal or neural foraminal stenosis.    L2-L3: Moderate to severe disc height loss. Symmetric diffuse disc  bulge with posterior endplate osteophytic ridging. Mild facet  arthropathy. Mild spinal canal stenosis. Mild bilateral neural  foraminal stenosis.    L3-L4: Minimal disc height loss. Symmetric disc bulge with posterior  endplate  osteophytic ridging. Mild to moderate facet arthropathy.  Ligamentum flavum thickening. Mild central spinal canal stenosis with  mild to moderate bilateral lateral recess stenosis. Mild to moderate  left and mild right neural foraminal stenosis.    L4-L5: Minimal disc height loss. Symmetric disc bulge with  posterolateral marginal endplate osteophytes. There is a superimposed  cranially migrating right far lateral disc extrusion (series 6 image  36). Moderate bilateral facet arthropathy. Ligamentum flavum  thickening. Moderate central spinal canal stenosis with moderate to  marked bilateral lateral recess stenosis. There is moderate to severe  right neural foraminal stenosis with mass effect on the exiting right  L4 nerve roots. There is mild left neural foraminal stenosis.    L5-S1: Mild disc height loss. Symmetric disc bulge. Moderate bordering  on moderate to severe facet arthropathy. No significant spinal canal  stenosis. Mild/mild to moderate bilateral neural foraminal stenosis.       Impression    IMPRESSION:  1. 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  2. Moderate central spinal canal stenosis with moderate to marked  bilateral lateral recess narrowing at L4-L5.  3. Cranially migrating right far lateral disc extrusion at L4-L5  contributing to moderate to severe right neural foraminal stenosis  with mass effect on the exiting right L4 nerve root. Correlate  clinically for possible right L4 radiculopathy. Lesser degrees of  neural foraminal narrowing elsewhere, as described.  4. Mild multilevel degenerative spondylolisthesis.    RODRICK HODGES MD         SYSTEM ID:  [id number]        ,

## 2025-04-15 ENCOUNTER — TELEPHONE (OUTPATIENT)
Dept: DERMATOLOGY | Facility: CLINIC | Age: 85
End: 2025-04-15
Payer: COMMERCIAL

## 2025-04-15 DIAGNOSIS — L57.0 ACTINIC KERATOSIS: Primary | ICD-10-CM

## 2025-04-15 NOTE — TELEPHONE ENCOUNTER
----- Message from Jorge Ray sent at 4/15/2025  7:15 AM CDT -----  Frontal scalp:  - Squamous cell carcinoma, well-differentiated - (see description)          Schedule excision

## 2025-04-15 NOTE — LETTER
April 15, 2025    Markel Padilla  [address] [place] [address]      Dear Markel      You are scheduled for Mohs Surgery on 4/30/25 at 7:30 am.     Please check in at 2nd floor [facility].     Be sure to eat a good breakfast and bathe and wash your hair prior to Surgery. Please bring  with you if this is above your neck    If you are taking any anti-coagulants that are prescribed by your Doctor (such as Coumadin/warfarin, Plavix, Aspirin, Ibuprofen), please continue taking them.     However, If you are taking anti-coagulants over the counter without  a Doctor's order for a Medical condition, please discontinue them 10 days prior to Surgery.      Please wear loose comfortable clothing as it could possibly be 4-6 hours until your surgery is completed depending upon how many layers of tissue need to be removed.     If you need any mobility assistance (getting on the exam chair or toilet) please bring a caregiver, family member, or staff member to assist you. We are not equipped to transfer patients.    Thank you,    Jorge Ray MD

## 2025-04-15 NOTE — TELEPHONE ENCOUNTER
Patient Contact    Attempt # 1    Was call answered?  Yes, left message with wife, he will call back.      [facility]   [phone]

## 2025-04-30 ENCOUNTER — OFFICE VISIT (OUTPATIENT)
Dept: DERMATOLOGY | Facility: CLINIC | Age: 85
End: 2025-04-30
Payer: COMMERCIAL

## 2025-04-30 DIAGNOSIS — D23.9 DERMAL NEVUS: Primary | ICD-10-CM

## 2025-04-30 DIAGNOSIS — L81.4 LENTIGO: ICD-10-CM

## 2025-04-30 DIAGNOSIS — D18.01 ANGIOMA OF SKIN: ICD-10-CM

## 2025-04-30 DIAGNOSIS — L82.1 SEBORRHEIC KERATOSES: ICD-10-CM

## 2025-04-30 DIAGNOSIS — C44.42 SQUAMOUS CELL CARCINOMA OF SCALP: ICD-10-CM

## 2025-04-30 PROCEDURE — 17312 MOHS ADDL STAGE: CPT | Performed by: DERMATOLOGY

## 2025-04-30 PROCEDURE — 17311 MOHS 1 STAGE H/N/HF/G: CPT | Performed by: DERMATOLOGY

## 2025-04-30 PROCEDURE — 99203 OFFICE O/P NEW LOW 30 MIN: CPT | Mod: 25 | Performed by: DERMATOLOGY

## 2025-04-30 NOTE — PROGRESS NOTES
Markel Padilla , a 84 year old year old male patient, I was asked to see by CAT Zee for squamous cell carcinoma on scalp.  Patient has no other skin complaints today.  Remainder of the HPI, Meds, PMH, Allergies, FH, and SH was reviewed in chart.      Past Medical History:   Diagnosis Date    Ehrlichiosis     Hemorrhage gastric     Hypercholesteremia     Hypertension     MEDICAL HISTORY OF -     major forearm laceration       Past Surgical History:   Procedure Laterality Date    NO HISTORY OF SURGERY          Family History   Problem Relation Age of Onset    Heart Disease Mother     Lipids Mother     C.A.D. Father         age 65    Respiratory Sister         COPD    Hypertension Son     Hypertension Son        Social History     Socioeconomic History    Marital status:      Spouse name: Not on file    Number of children: Not on file    Years of education: Not on file    Highest education level: Not on file   Occupational History    Not on file   Tobacco Use    Smoking status: Former     Current packs/day: 0.00     Types: Cigarettes     Quit date: 1979     Years since quittin.5    Smokeless tobacco: Current     Types: Chew     Last attempt to quit: 1989   Vaping Use    Vaping status: Never Used   Substance and Sexual Activity    Alcohol use: Yes     Comment: a glass of wine everyday at lunch    Drug use: No    Sexual activity: Yes     Partners: Female   Other Topics Concern    Parent/sibling w/ CABG, MI or angioplasty before 65F 55M? Not Asked   Social History Narrative    Not on file     Social Drivers of Health     Financial Resource Strain: Not on file   Food Insecurity: Not on file   Transportation Needs: Not on file   Physical Activity: Not on file   Stress: Not on file   Social Connections: Not on file   Interpersonal Safety: Low Risk  (3/19/2024)    Interpersonal Safety     Do you feel physically and emotionally safe where you currently live?: Yes     Within the past 12 months, have  you been hit, slapped, kicked or otherwise physically hurt by someone?: No     Within the past 12 months, have you been humiliated or emotionally abused in other ways by your partner or ex-partner?: No   Housing Stability: Not on file       Outpatient Encounter Medications as of 4/30/2025   Medication Sig Dispense Refill    amLODIPine (NORVASC) 2.5 MG tablet Take 1 tablet (2.5 mg) by mouth daily. 90 tablet 0    calcium carbonate (OS-FREDIS 500 MG [place]. [place]) 500 MG tablet Take 1 tablet (500 mg) by mouth 2 times daily 180 tablet 3    clobetasol propionate (TEMOVATE) 0.05 % external cream Apply topically 2 times daily. 30 g 1    FISH OIL 1000 MG OR CAPS 1 tablet daily      GLUCOSAMINE CHONDR 1500 COMPLX OR 1 tablet daily      lisinopril (ZESTRIL) 20 MG tablet Take 1 tablet (20 mg) by mouth daily. 90 tablet 1    MULTI-VITAMIN OR TABS 1 TABLET DAILY      omeprazole (PRILOSEC) 20 MG DR capsule Take 1 capsule (20 mg) by mouth daily. 90 capsule 3    simvastatin (ZOCOR) 40 MG tablet Take 1 tablet (40 mg) by mouth at bedtime. 90 tablet 1    triamcinolone (KENALOG) 0.1 % external cream APPLY EXTERNALLY TO THE AFFECTED AREA TWICE DAILY AS NEEDED FOR IRRITATION 80 g 0     No facility-administered encounter medications on file as of 4/30/2025.             Review Of Systems  Skin: As above  Eyes: negative  Ears/Nose/Throat: negative  Respiratory: No shortness of breath, dyspnea on exertion, cough, or hemoptysis  Cardiovascular: negative  Gastrointestinal: negative  Genitourinary: negative  Musculoskeletal: negative  Neurologic: negative  Psychiatric: negative  Hematologic/Lymphatic/Immunologic: negative  Endocrine: negative      O:   NAD, WDWN, Alert & Oriented, Mood & Affect wnl, Vitals stable   General appearance coco ii   Vitals stable   Alert, oriented and in no acute distress      Following lymph nodes palpated: Occipital, Cervical, Supraclavicular no lad  Mid frontal scalp 1cm red plaque     Stuck on papules and brown macules on  trunk and ext    Red papules on trunk   Flesh colored papules on trunk          Eyes: Conjunctivae/lids:Normal     ENT: Lips, mucosa: normal    MSK:Normal    Cardiovascular: peripheral edema none    Pulm: Breathing Normal    Lymph Nodes: No Head and Neck Lymphadenopathy     Neuro/Psych: Orientation:Normal; Mood/Affect:Normal      A/P:  1. Seborrheic keratosis, lentigo, angioma, dermal nevus  2. Scalp squamous cell carcinoma   It was a pleasure speaking to Markel Padilla today.  Previous clinic  notes and pertinent laboratory tests were reviewed prior to Markel Padilla's visit.  Signs and Symptoms of skin cancer discussed with patient.  Patient encouraged to perform monthly skin exams.  UV precautions reviewed with patient.  Risks of non-melanoma skin cancer discussed with patient   Return to clinic 6 months  PROCEDURE NOTE  Mid frontal scalp squamous cell carcinoma   MOHS:   Location    The rationale for Mohs surgery was discussed with the patient and consent was obtained.  The risks and benefits as well as alternatives to therapy were discussed, in detail.  Specifically, the risks of infection, scarring, bleeding, prolonged wound healing, incomplete removal, allergy to anesthesia, nerve injury and recurrence were addressed.  Indication for Mohs was Location. Prior to the procedure, the treatment site was clearly identified and, if available, confirmed with previous photos and confirmed by the patient   All components of the Universal Protocol/PAUSE rule were completed.  The Mohs surgeon operated in two distinct and integrated capacities as the surgeon and pathologist.      The area was prepped with Betasept.  A rim of normal appearing skin was marked circumferentially around the lesion.  The area was infiltrated with local anesthesia.  The tumor was first debulked to remove all clinically apparent tumor.  An incision following the standard Mohs approach was done and the specimen was oriented,mapped and  placed in 2 block(s).  Each specimen was then chromacoded and processed in the Mohs laboratory using standard Mohs technique and submitted for frozen section histology.  Frozen section analysis showed  residual tumor but CLEAR MARGINS.    1st stage:There is a proliferation of irregular nests of abnormal squamous cells arising from the epidermis and invading the dermis. These are well differentiated. The dermis shows a variable superficial perivascular inflammatory infiltrate.     The tumor was excised using standard Mohs technique in 2 stages(s).  CLEAR MARGINS OBTAINED and Final defect size was 1.6 x 1.4 cm.     We discussed the options for wound management in full with the patient including risks/benefits/ possible outcomes.      REPAIR SECOND INTENT: We discussed the options for wound management in full with the patient including risks/benefits/possible outcomes. Decision made to allow the wound to heal by second intention. Cautery was used for for hemostasis. EBL minimal; complications none; wound care routine.  The patient was discharged in good condition and will return in one month or prn for wound evaluation.

## 2025-04-30 NOTE — LETTER
2025      Markel Padilla  [address] [place] [address]      Dear Colleague,    Thank you for referring your patient, Markel Padilla, to the [facility]. Please see a copy of my visit note below.    Surgical Office Location :   [facility]  [address] [place] [address]          Markel Padilla , a 84 year old year old male patient, I was asked to see by CAT Zee for squamous cell carcinoma on scalp.  Patient has no other skin complaints today.  Remainder of the HPI, Meds, PMH, Allergies, FH, and SH was reviewed in chart.      Past Medical History:   Diagnosis Date     Ehrlichiosis      Hemorrhage gastric      Hypercholesteremia      Hypertension      MEDICAL HISTORY OF -     major forearm laceration       Past Surgical History:   Procedure Laterality Date     NO HISTORY OF SURGERY          Family History   Problem Relation Age of Onset     Heart Disease Mother      Lipids Mother      C.A.D. Father         age 65     Respiratory Sister         COPD     Hypertension Son      Hypertension Son        Social History     Socioeconomic History     Marital status:      Spouse name: Not on file     Number of children: Not on file     Years of education: Not on file     Highest education level: Not on file   Occupational History     Not on file   Tobacco Use     Smoking status: Former     Current packs/day: 0.00     Types: Cigarettes     Quit date: 1979     Years since quittin.5     Smokeless tobacco: Current     Types: Chew     Last attempt to quit: 1989   Vaping Use     Vaping status: Never Used   Substance and Sexual Activity     Alcohol use: Yes     Comment: a glass of wine everyday at lunch     Drug use: No     Sexual activity: Yes     Partners: Female   Other Topics Concern     Parent/sibling w/ CABG, MI or angioplasty before 65F 55M? Not Asked   Social History Narrative     Not on file     Social Drivers of Health      Financial Resource Strain: Not on file   Food Insecurity: Not on file   Transportation Needs: Not on file   Physical Activity: Not on file   Stress: Not on file   Social Connections: Not on file   Interpersonal Safety: Low Risk  (3/19/2024)    Interpersonal Safety      Do you feel physically and emotionally safe where you currently live?: Yes      Within the past 12 months, have you been hit, slapped, kicked or otherwise physically hurt by someone?: No      Within the past 12 months, have you been humiliated or emotionally abused in other ways by your partner or ex-partner?: No   Housing Stability: Not on file       Outpatient Encounter Medications as of 4/30/2025   Medication Sig Dispense Refill     amLODIPine (NORVASC) 2.5 MG tablet Take 1 tablet (2.5 mg) by mouth daily. 90 tablet 0     calcium carbonate (OS-FREDIS 500 MG [place]. [place]) 500 MG tablet Take 1 tablet (500 mg) by mouth 2 times daily 180 tablet 3     clobetasol propionate (TEMOVATE) 0.05 % external cream Apply topically 2 times daily. 30 g 1     FISH OIL 1000 MG OR CAPS 1 tablet daily       GLUCOSAMINE CHONDR 1500 COMPLX OR 1 tablet daily       lisinopril (ZESTRIL) 20 MG tablet Take 1 tablet (20 mg) by mouth daily. 90 tablet 1     MULTI-VITAMIN OR TABS 1 TABLET DAILY       omeprazole (PRILOSEC) 20 MG DR capsule Take 1 capsule (20 mg) by mouth daily. 90 capsule 3     simvastatin (ZOCOR) 40 MG tablet Take 1 tablet (40 mg) by mouth at bedtime. 90 tablet 1     triamcinolone (KENALOG) 0.1 % external cream APPLY EXTERNALLY TO THE AFFECTED AREA TWICE DAILY AS NEEDED FOR IRRITATION 80 g 0     No facility-administered encounter medications on file as of 4/30/2025.             Review Of Systems  Skin: As above  Eyes: negative  Ears/Nose/Throat: negative  Respiratory: No shortness of breath, dyspnea on exertion, cough, or hemoptysis  Cardiovascular: negative  Gastrointestinal: negative  Genitourinary: negative  Musculoskeletal: negative  Neurologic:  negative  Psychiatric: negative  Hematologic/Lymphatic/Immunologic: negative  Endocrine: negative      O:   NAD, WDWN, Alert & Oriented, Mood & Affect wnl, Vitals stable   General appearance coco ii   Vitals stable   Alert, oriented and in no acute distress      Following lymph nodes palpated: Occipital, Cervical, Supraclavicular no lad  Mid frontal scalp 1cm red plaque     Stuck on papules and brown macules on trunk and ext    Red papules on trunk   Flesh colored papules on trunk          Eyes: Conjunctivae/lids:Normal     ENT: Lips, mucosa: normal    MSK:Normal    Cardiovascular: peripheral edema none    Pulm: Breathing Normal    Lymph Nodes: No Head and Neck Lymphadenopathy     Neuro/Psych: Orientation:Normal; Mood/Affect:Normal      A/P:  1. Seborrheic keratosis, lentigo, angioma, dermal nevus  2. Scalp squamous cell carcinoma   It was a pleasure speaking to Markel Padilla today.  Previous clinic  notes and pertinent laboratory tests were reviewed prior to Markel Padilla's visit.  Signs and Symptoms of skin cancer discussed with patient.  Patient encouraged to perform monthly skin exams.  UV precautions reviewed with patient.  Risks of non-melanoma skin cancer discussed with patient   Return to clinic 6 months  PROCEDURE NOTE  Mid frontal scalp squamous cell carcinoma   MOHS:   Location    The rationale for Mohs surgery was discussed with the patient and consent was obtained.  The risks and benefits as well as alternatives to therapy were discussed, in detail.  Specifically, the risks of infection, scarring, bleeding, prolonged wound healing, incomplete removal, allergy to anesthesia, nerve injury and recurrence were addressed.  Indication for Mohs was Location. Prior to the procedure, the treatment site was clearly identified and, if available, confirmed with previous photos and confirmed by the patient   All components of the Universal Protocol/PAUSE rule were completed.  The Mohs surgeon operated in  two distinct and integrated capacities as the surgeon and pathologist.      The area was prepped with Betasept.  A rim of normal appearing skin was marked circumferentially around the lesion.  The area was infiltrated with local anesthesia.  The tumor was first debulked to remove all clinically apparent tumor.  An incision following the standard Mohs approach was done and the specimen was oriented,mapped and placed in 2 block(s).  Each specimen was then chromacoded and processed in the Mohs laboratory using standard Mohs technique and submitted for frozen section histology.  Frozen section analysis showed  residual tumor but CLEAR MARGINS.    1st stage:There is a proliferation of irregular nests of abnormal squamous cells arising from the epidermis and invading the dermis. These are well differentiated. The dermis shows a variable superficial perivascular inflammatory infiltrate.     The tumor was excised using standard Mohs technique in 2 stages(s).  CLEAR MARGINS OBTAINED and Final defect size was 1.6 x 1.4 cm.     We discussed the options for wound management in full with the patient including risks/benefits/ possible outcomes.      REPAIR SECOND INTENT: We discussed the options for wound management in full with the patient including risks/benefits/possible outcomes. Decision made to allow the wound to heal by second intention. Cautery was used for for hemostasis. EBL minimal; complications none; wound care routine.  The patient was discharged in good condition and will return in one month or prn for wound evaluation.        Again, thank you for allowing me to participate in the care of your patient.        Sincerely,        Jorge Ray MD    Electronically signed

## 2025-04-30 NOTE — PATIENT INSTRUCTIONS
Open Wound Care     for _____SCALP_________        No strenuous activity for 48 hours    Take Tylenol as needed for discomfort.                                                .         Do not drink alcoholic beverages for 48 hours.    Keep the pressure bandage in place for 24 hours. If the bandage becomes blood tinged or loose, reinforce it with gauze and tape.        (Refer to the reverse side of this page for management of bleeding).    Remove bandage in 24 hours and begin wound care as follows:     Clean area with tap water using a Q tip or gauze pad, (shower / bathe normally)  Dry wound with Q tip or gauze pad  Apply Aquaphor, Vaseline, Polysporin or Bacitracin Ointment with a Q tip  Do NOT use Neosporin Ointment *  Cover the wound with a band-aid or nonstick gauze pad and paper tape.  Repeat wound care once a day until wound is completely healed.    It is an old wives tale that a wound heals better when it is exposed to air and allowed to dry out. The wound will heal faster with a better cosmetic result if it is kept moist with ointment and covered with a bandage.  Do not let the wound dry out.      Supplies Needed:                Qtips or gauze pads                Polysporin or Bacitracin Ointment                Bandaids or nonstick gauze pads and paper tape    Wound care kits and brown paper tape are available for purchase at   the pharmacy.    BLEEDING:    Use tightly rolled up gauze or cloth to apply direct pressure over the bandage for 20   minutes.  Reapply pressure for an additional 20 minutes if necessary  Call the office or go to the nearest emergency room if pressure fails to stop the bleeding.  Use additional gauze and tape to maintain pressure once the bleeding has stopped.  Begin wound care 24 hours after surgery as directed.                  WOUND HEALING    One week after surgery a pink / red halo will form around the outside of the wound.   This is new skin.  The center of the wound will appear  yellowish white and produce some drainage.  The pink halo will slowly migrate in toward the center of the wound until the wound is covered with new shiny pink skin.  There will be no more drainage when the wound is completely healed.  It will take six months to one year for the redness to fade.  The scar may be itchy, tight and sensitive to extreme temperatures for a year after the surgery.  Massaging the area several times a day for several minutes after the wound is completely healed will help the scar soften and normalize faster. Begin massage only after healing is complete.      In case of emergency call: Dr Ray: [phone]    [facility]: [phone]    [facility]:[phone]                   Proper skin care from [facility]:    -Eliminate harsh soaps as they strip the natural oils from the skin, often resulting in dry itchy skin ( i.e. Dial, Zest, [place] Spring)  -Use mild soaps such as Cetaphil or Dove Sensitive Skin in the shower. You do not need to use soap on arms, legs, and trunk every time you shower unless visibly soiled.   -Avoid hot or cold showers.  -After showering, lightly dry off and apply moisturizing within 2-3 minutes. This will help trap moisture in the skin.   -Aggressive use of a moisturizer at least 1-2 times a day to the entire body (including -Vanicream, Cetaphil, Aquaphor or Cerave) and moisturize hands after every washing.  -We recommend using moisturizers that come in a tub that needs to be scooped out, not a pump. This has more of an oil base. It will hold moisture in your skin much better than a water base moisturizer. The above recommended are non-pore clogging.      Wear a sunscreen with at least SPF 30 on your face, ears, neck and V of the chest daily. Wear sunscreen on other areas of the body if those areas are exposed to the sun throughout the day. Sunscreens can contain physical and/or chemical blockers. Physical blockers are less likely to  clog pores, these include zinc oxide and titanium dioxide. Reapply every two hour and after swimming.     Sunscreen examples: https://www.ewg.org/sunscreen/    UV radiation  UVA radiation remains constant throughout the day and throughout the year. It is a longer wavelength than UVB and therefore penetrates deeper into the skin leading to immediate and delayed tanning, photoaging, and skin cancer. 70-80% of UVA and UVB radiation occurs between the hours of 10am-2pm.  UVB radiation  UVB radiation causes the most harmful effects and is more significant during the summer months. However, snow and ice can reflect UVB radiation leading to skin damage during the winter months as well. UVB radiation is responsible for tanning, burning, inflammation, delayed erythema (pinkness), pigmentation (brown spots), and skin cancer.     I recommend self monthly full body exams and yearly full body exams with a dermatology provider. If you develop a new or changing lesion please follow up for examination. Most skin cancers are pink and scaly or pink and pearly. However, we do see blue/brown/black skin cancers.  Consider the ABCDEs of melanoma when giving yourself your monthly full body exam ( don't forget the groin, buttocks, feet, toes, etc). A-asymmetry, B-borders, C-color, D-diameter, E-elevation or evolving. If you see any of these changes please follow up in clinic. If you cannot see your back I recommend purchasing a hand held mirror to use with a larger wall mirror.       Checking for Skin Cancer  You can find cancer early by checking your skin each month. There are 3 kinds of skin cancer. They are melanoma, basal cell carcinoma, and squamous cell carcinoma. Doing monthly skin checks is the best way to find new marks or skin changes. Follow the instructions below for checking your skin.   The ABCDEs of checking moles for melanoma   Check your moles or growths for signs of melanoma using ABCDE:   Asymmetry: the sides of the mole  or growth don t match  Border: the edges are ragged, notched, or blurred  Color: the color within the mole or growth varies  Diameter: the mole or growth is larger than 6 mm (size of a pencil eraser)  Evolving: the size, shape, or color of the mole or growth is changing (evolving is not shown in the images below)    Checking for other types of skin cancer  Basal cell carcinoma or squamous cell carcinoma have symptoms such as:     A spot or mole that looks different from all other marks on your skin  Changes in how an area feels, such as itching, tenderness, or pain  Changes in the skin's surface, such as oozing, bleeding, or scaliness  A sore that does not heal  New swelling or redness beyond the border of a mole    Who s at risk?  Anyone can get skin cancer. But you are at greater risk if you have:   Fair skin, light-colored hair, or light-colored eyes  Many moles or abnormal moles on your skin  A history of sunburns from sunlight or tanning beds  A family history of skin cancer  A history of exposure to radiation or chemicals  A weakened immune system  If you have had skin cancer in the past, you are at risk for recurring skin cancer.   How to check your skin  Do your monthly skin checkups in front of a full-length mirror. Check all parts of your body, including your:   Head (ears, face, neck, and scalp)  Torso (front, back, and sides)  Arms (tops, undersides, upper, and lower armpits)  Hands (palms, backs, and fingers, including under the nails)  Buttocks and genitals  Legs (front, back, and sides)  Feet (tops, soles, toes, including under the nails, and between toes)  If you have a lot of moles, take digital photos of them each month. Make sure to take photos both up close and from a distance. These can help you see if any moles change over time.   Most skin changes are not cancer. But if you see any changes in your skin, call your doctor right away. Only he or she can diagnose a problem. If you have skin  cancer, seeing your doctor can be the first step toward getting the treatment that could save your life.   [facility] last reviewed this educational content on 4/1/2019 2000-2020 [employer]. [address] [place] [address], [place] [address]. All rights reserved. This information is not intended as a substitute for professional medical care. Always follow your healthcare professional's instructions.       When should I call my doctor?  If you are worsening or not improving, please, contact us or seek urgent care as noted below.     Who should I call with questions (adults)?    [facility] [phone]  For urgent needs outside of business hours call the [facility] at [phone] and ask for the dermatology resident on call to be paged  If this is a medical emergency and you are unable to reach an ER, Call [phone]      If you need a prescription refill, please contact your pharmacy. Refills are approved or denied by our Physicians during normal business hours, Monday through Friday.  Per office policy, refills will not be granted if you have not been seen within the past year (or sooner depending on the condition).

## 2025-06-25 ENCOUNTER — TELEPHONE (OUTPATIENT)
Dept: FAMILY MEDICINE | Facility: CLINIC | Age: 85
End: 2025-06-25
Payer: COMMERCIAL

## 2025-06-25 NOTE — TELEPHONE ENCOUNTER
Patient Quality Outreach    Patient is due for the following:   Physical Annual Wellness Visit    Action(s) Taken:   Schedule a Annual Wellness Visit    Type of outreach:    Sent letter.    Questions for provider review:    None         Rut Max MA  Chart routed to None.

## 2025-06-25 NOTE — LETTER
June 25, 2025    Markel Padilla    [id number] [place] [address]    Hello,     Your care team at [facility] values your health and well-being. After reviewing your chart, we have identified recommendation(s) to help you better manage your health.    It's time for your Annual Wellness visit. During your visit, we'll discuss your health, well-being, and any questions you may have related to preventive care. We'll also review any recommended tests, exams, or screenings you might need. To schedule please call [phone] ([phone]) or use your [employer] account.    If you recently had or are having any of these services soon, please contact the clinic via phone or [employer] so that your care team can update your records.    We look forward to seeing you at your upcoming visit.    If you have any questions or concerns, please contact our clinic. Thank you for continuing to trust us with your care.    Sincerely,    Your [facility] Care Team            Electronically signed

## 2025-06-25 NOTE — TELEPHONE ENCOUNTER
Home Care is calling regarding an established patient with [facility].  Requesting orders from: Jonathan Oliver  RN APPROVED: RN able to provide verbal orders.  Home Care will send orders for signature.  RN will close encounter.  Is this a request for a temporary pause in the home care episode?  No    Orders Requested    Skilled Nursing  Request for initial evaluation and treatment (one time)   RN gave verbal order: Yes      Physical Therapy  Request for initial evaluation and treatment (one time)   RN gave verbal order: Yes      Occupational Therapy  Request for initial certification (first set of orders)   Frequency: 1 x a week for 3 weeks; starting the week of June 29th, 2025  RN gave verbal order: Yes        Phone number Home Care can be reached at: [phone]   Okay to leave a detailed message?: Yes    Contacts       Contact Date/Time Type Contact Phone/Fax    06/25/2025 02:47 PM CDT Phone (Incoming) JESSE Ramirez with [facility] [phone]     Secure and confidential line          Bonny Henriquez RN

## 2025-06-26 ENCOUNTER — MEDICAL CORRESPONDENCE (OUTPATIENT)
Dept: HEALTH INFORMATION MANAGEMENT | Facility: CLINIC | Age: 85
End: 2025-06-26
Payer: COMMERCIAL

## 2025-06-26 ENCOUNTER — TELEPHONE (OUTPATIENT)
Dept: FAMILY MEDICINE | Facility: CLINIC | Age: 85
End: 2025-06-26
Payer: COMMERCIAL

## 2025-06-26 NOTE — TELEPHONE ENCOUNTER
Home Care is calling regarding an established patient with [facility].  Requesting orders from: Jonathan Oliver  RN APPROVED: RN able to provide verbal orders.  Home Care will send orders for signature.  RN will close encounter.  Is this a request for a temporary pause in the home care episode?  No    Orders Requested    Skilled Nursing  Request for initial certification (first set of orders)   Frequency: 2 visits from 6/27/25-7/5/25, then 1 visit every week from 7/6/25-7/19/25, then 1-3 prn visits from 6/27/25-7/19/25.  RN gave verbal order: Yes      Social Work  Request for initial evaluation and treatment (one time) - transportation resources, etc.  RN gave verbal order: Yes      Phone number Home Care can be reached at: [phone]    Contacts       Contact Date/Time Type Contact Phone/Fax    06/26/2025 05:02 PM CDT Phone (Incoming) ASA Littlejohn, [facility] (Home Care) [phone]          Anna Cervantes RN

## 2025-06-30 ENCOUNTER — TELEPHONE (OUTPATIENT)
Dept: FAMILY MEDICINE | Facility: CLINIC | Age: 85
End: 2025-06-30
Payer: COMMERCIAL

## 2025-06-30 NOTE — TELEPHONE ENCOUNTER
Audrey, Physical Therapist with [facility] is calling regarding an established patient with [facility].  Requesting orders from: Jonathan Oliver RN APPROVED: RN able to provide verbal orders.  Home Care will send orders for signature.  RN will close encounter.  Is this a request for a temporary pause in the home care episode?  No    Orders Requested    Physical Therapy  Request for initial certification (first set of orders)   Frequency: 1 x in next 12 days; 1 x wk x 3 wks  RN gave verbal order: Yes          Okay to leave a detailed message?: Yes  Contacts       Contact Date/Time Type Contact Phone/Fax    06/30/2025 01:38 PM CDT Phone (Incoming) Audrey PT with [facility] (Home Care) [phone]            Carie White RN  Clinical Triage/Primary Care  [facility]

## 2025-07-02 ENCOUNTER — OFFICE VISIT (OUTPATIENT)
Dept: FAMILY MEDICINE | Facility: CLINIC | Age: 85
End: 2025-07-02
Payer: COMMERCIAL

## 2025-07-02 VITALS
RESPIRATION RATE: 16 BRPM | DIASTOLIC BLOOD PRESSURE: 63 MMHG | WEIGHT: 173 LBS | HEART RATE: 77 BPM | SYSTOLIC BLOOD PRESSURE: 111 MMHG | BODY MASS INDEX: 27.92 KG/M2 | OXYGEN SATURATION: 97 %
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
## 2025-07-02 DIAGNOSIS — I10 HYPERTENSION GOAL BP (BLOOD PRESSURE) < 140/90: ICD-10-CM

## 2025-07-02 DIAGNOSIS — R07.81 RIB PAIN: ICD-10-CM

## 2025-07-02 PROCEDURE — 3074F SYST BP LT 130 MM HG: CPT | Performed by: PHYSICIAN ASSISTANT

## 2025-07-02 PROCEDURE — 99213 OFFICE O/P EST LOW 20 MIN: CPT | Performed by: PHYSICIAN ASSISTANT

## 2025-07-02 PROCEDURE — 3078F DIAST BP <80 MM HG: CPT | Performed by: PHYSICIAN ASSISTANT

## 2025-07-02 NOTE — PROGRESS NOTES
Assessment & Plan       ICD-10-CM    1. Fall, sequela  W19.XXXS       2. Rib pain  R07.81       3. Hypertension goal BP (blood pressure) < 140/90  I10       Talk to patient about his concerns medical condition is stable continue conservative management with ice and heat activity modification.   His blood pressure readings have been on the lower end we will go ahead and stop his amlodipine for now and continue lisinopril and recheck in 4 weeks.  Warning signs side effects were discussed.      Subjective   Markel is a 84 year old, presenting for the following health issues:  Hospital F/U    HPI          Hospital Follow-up Visit:    Hospital/Nursing Home/IP Rehab Facility: [facility]  Date of Admission: 06/23/2025  Date of Discharge: 06/24/2025  Reason(s) for Admission: fall   Do you have any other stressors you would like to discuss with your provider? No    Problems taking medications regularly:  None  Medication changes since discharge: None  Problems adhering to non-medication therapy:  None    Summary of hospitalization:  CareEverywhere information obtained and reviewed  Hospital Course     Markel Padilla is a(n) 84 y.o. with a history of HTN, hyperlipidemia, who was admitted on 6/23/2025 after a fall and found to have minimally displaced 9th/ 10th rib fracture. Patient was admitted for pain control/ safety disposition. PT/OT saw him and recommend home PT/OT which he agrees to and walking with walker which he also agree to       Diagnostic Tests/Treatments reviewed.  Follow up needed: none  Other Healthcare Providers Involved in Patient s Care:         None  Update since discharge: stable.         Plan of care communicated with patient and wife           Review of Systems  Constitutional, HEENT, cardiovascular, pulmonary, gi and gu systems are negative, except as otherwise noted.      Objective    /63   Pulse 77   Resp 16   Wt 78.5 kg (173 lb)   SpO2 97%   BMI 27.92 kg/m    Body mass index is 27.92  kg/m .  Physical Exam   GENERAL: alert and no distress  NECK: no adenopathy, no asymmetry, masses, or scars  RESP: lungs clear to auscultation - no rales, rhonchi or wheezes  CV: regular rate and rhythm, normal S1 S2, no S3 or S4, no murmur, click or rub, no peripheral edema  ABDOMEN: soft, nontender, no hepatosplenomegaly, no masses and bowel sounds normal  MS: no gross musculoskeletal defects noted, no edema            Signed Electronically by: Jonathan Oliver PA-C

## 2025-07-08 ENCOUNTER — MEDICAL CORRESPONDENCE (OUTPATIENT)
Dept: HEALTH INFORMATION MANAGEMENT | Facility: CLINIC | Age: 85
End: 2025-07-08

## 2025-07-15 ENCOUNTER — MEDICAL CORRESPONDENCE (OUTPATIENT)
Dept: HEALTH INFORMATION MANAGEMENT | Facility: CLINIC | Age: 85
End: 2025-07-15

## 2025-08-05 ENCOUNTER — OFFICE VISIT (OUTPATIENT)
Dept: FAMILY MEDICINE | Facility: CLINIC | Age: 85
End: 2025-08-05
Payer: COMMERCIAL

## 2025-08-05 VITALS
HEART RATE: 69 BPM | RESPIRATION RATE: 16 BRPM | WEIGHT: 173 LBS | BODY MASS INDEX: 29.53 KG/M2 | TEMPERATURE: 97 F | OXYGEN SATURATION: 97 % | DIASTOLIC BLOOD PRESSURE: 70 MMHG | SYSTOLIC BLOOD PRESSURE: 120 MMHG | HEIGHT: 64 IN
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
## 2025-08-05 DIAGNOSIS — I10 HYPERTENSION GOAL BP (BLOOD PRESSURE) < 140/90: Primary | ICD-10-CM

## 2025-08-05 PROCEDURE — G2211 COMPLEX E/M VISIT ADD ON: HCPCS | Performed by: PHYSICIAN ASSISTANT

## 2025-08-05 PROCEDURE — 3074F SYST BP LT 130 MM HG: CPT | Performed by: PHYSICIAN ASSISTANT

## 2025-08-05 PROCEDURE — 1126F AMNT PAIN NOTED NONE PRSNT: CPT | Performed by: PHYSICIAN ASSISTANT

## 2025-08-05 PROCEDURE — 99213 OFFICE O/P EST LOW 20 MIN: CPT | Performed by: PHYSICIAN ASSISTANT

## 2025-08-05 PROCEDURE — 3078F DIAST BP <80 MM HG: CPT | Performed by: PHYSICIAN ASSISTANT

## 2025-08-05 RX ORDER — LISINOPRIL 10 MG/1
10 TABLET ORAL DAILY
Qty: 90 TABLET | Refills: 1 | Status: SHIPPED | OUTPATIENT
Start: 2025-08-05

## 2025-08-05 ASSESSMENT — PAIN SCALES - GENERAL: PAINLEVEL_OUTOF10: NO PAIN (0)
